# Patient Record
Sex: MALE | HISPANIC OR LATINO | URBAN - METROPOLITAN AREA
[De-identification: names, ages, dates, MRNs, and addresses within clinical notes are randomized per-mention and may not be internally consistent; named-entity substitution may affect disease eponyms.]

---

## 2017-03-03 ENCOUNTER — EMERGENCY (EMERGENCY)
Facility: HOSPITAL | Age: 47
LOS: 1 days | Discharge: HOME | End: 2017-03-03
Attending: EMERGENCY MEDICINE | Admitting: EMERGENCY MEDICINE
Payer: COMMERCIAL

## 2017-03-03 VITALS — HEIGHT: 65 IN | WEIGHT: 190.04 LBS

## 2017-03-03 VITALS — SYSTOLIC BLOOD PRESSURE: 139 MMHG | DIASTOLIC BLOOD PRESSURE: 92 MMHG

## 2017-03-03 PROCEDURE — 72100 X-RAY EXAM L-S SPINE 2/3 VWS: CPT | Mod: 26

## 2017-03-03 PROCEDURE — 72070 X-RAY EXAM THORAC SPINE 2VWS: CPT | Mod: 26

## 2017-03-03 PROCEDURE — 72070 X-RAY EXAM THORAC SPINE 2VWS: CPT

## 2017-03-03 PROCEDURE — 72100 X-RAY EXAM L-S SPINE 2/3 VWS: CPT

## 2017-03-03 PROCEDURE — 99284 EMERGENCY DEPT VISIT MOD MDM: CPT | Mod: 25

## 2017-03-03 PROCEDURE — 99284 EMERGENCY DEPT VISIT MOD MDM: CPT

## 2017-03-03 PROCEDURE — 96372 THER/PROPH/DIAG INJ SC/IM: CPT

## 2017-03-03 PROCEDURE — T1013: CPT

## 2017-03-03 RX ORDER — KETOROLAC TROMETHAMINE 30 MG/ML
30 SYRINGE (ML) INJECTION ONCE
Qty: 0 | Refills: 0 | Status: DISCONTINUED | OUTPATIENT
Start: 2017-03-03 | End: 2017-03-03

## 2017-03-03 RX ORDER — DIAZEPAM 5 MG
5 TABLET ORAL ONCE
Qty: 0 | Refills: 0 | Status: DISCONTINUED | OUTPATIENT
Start: 2017-03-03 | End: 2017-03-03

## 2017-03-03 RX ORDER — IBUPROFEN 200 MG
1 TABLET ORAL
Qty: 16 | Refills: 0 | OUTPATIENT
Start: 2017-03-03 | End: 2017-03-07

## 2017-03-03 RX ADMIN — Medication 30 MILLIGRAM(S): at 12:01

## 2017-03-03 RX ADMIN — Medication 5 MILLIGRAM(S): at 12:01

## 2017-03-03 NOTE — ED STATDOCS - ATTENDING CONTRIBUTION TO CARE
I, Alonso Daily, performed the initial face to face bedside interview with this patient regarding history of present illness, review of symptoms and relevant past medical, social and family history.  I completed an independent physical examination.  I was the initial provider who evaluated this patient. I have signed out the follow up of any pending tests (i.e. labs, radiological studies) to the ACP.  I have communicated the patient’s plan of care and disposition with the ACP.  The history, relevant review of systems, past medical and surgical history, medical decision making, and physical examination was documented by the scribe in my presence and I attest to the accuracy of the documentation.

## 2017-03-03 NOTE — ED STATDOCS - OBJECTIVE STATEMENT
45 y/o male presents c/o back pain x 2 days. Went to [facility] the same day, he was given Ibuprofen 600 mg and Robaxin that has not been helping his pain. Pain is now worsening and radiating to his left leg. 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 Denies urinary/bowel incontinence, numbness/tingling, abd pain. No further complaints at this time. NKDA.  Randy Rouse utilized to obtain history.

## 2017-03-03 NOTE — ED STATDOCS - PROGRESS NOTE DETAILS
PA NOTE: Pt seen by intake physician and hpi/orders/plan reviewed. PT presenting to ED with complaints of back pain x 2 days.  Patient states that pain started when he twisted while moving some cardboard boxes.  Denies blunt trauma.  PE: GEN: Awake, alert,  NAD,  EYES: PERRL CARDIAC: Reg rate and rhythm, S1,S2, RRR  RESP: No distress noted. Lungs CTA bilaterally no wheeze, ronchi, rales. ABD: soft,  non-tender, no guarding. . NEURO: AOx3, no focal deficits   PLAN: xray and f/u with spine center

## 2017-03-03 NOTE — ED ADULT NURSE NOTE - CHIEF COMPLAINT QUOTE
lifted something heavy at work and hurt his back, happ 2 days ago, [facility] after he got hurt and was given motrin and robaxin - relief

## 2017-03-03 NOTE — ED STATDOCS - NS ED MD SCRIBE ATTENDING SCRIBE SECTIONS
HISTORY OF PRESENT ILLNESS/REVIEW OF SYSTEMS/PHYSICAL EXAM/DISPOSITION/HIV/PAST MEDICAL/SURGICAL/SOCIAL HISTORY/VITAL SIGNS( Pullset)

## 2017-03-10 ENCOUNTER — APPOINTMENT (OUTPATIENT)
Dept: RHEUMATOLOGY | Facility: CLINIC | Age: 47
End: 2017-03-10

## 2017-04-25 ENCOUNTER — EMERGENCY (EMERGENCY)
Facility: HOSPITAL | Age: 47
LOS: 1 days | Discharge: LEFT BEFORE DISCHARGE | End: 2017-04-25
Attending: EMERGENCY MEDICINE
Payer: SELF-PAY

## 2017-04-25 VITALS
RESPIRATION RATE: 20 BRPM | WEIGHT: 199.96 LBS | OXYGEN SATURATION: 97 % | TEMPERATURE: 98 F | DIASTOLIC BLOOD PRESSURE: 100 MMHG | SYSTOLIC BLOOD PRESSURE: 160 MMHG | HEART RATE: 76 BPM

## 2017-04-25 DIAGNOSIS — M50.10 CERVICAL DISC DISORDER WITH RADICULOPATHY, UNSPECIFIED CERVICAL REGION: ICD-10-CM

## 2017-04-25 DIAGNOSIS — M54.2 CERVICALGIA: ICD-10-CM

## 2017-04-25 PROCEDURE — 99284 EMERGENCY DEPT VISIT MOD MDM: CPT

## 2017-04-25 RX ORDER — DEXAMETHASONE 0.5 MG/5ML
10 ELIXIR ORAL ONCE
Qty: 0 | Refills: 0 | Status: COMPLETED | OUTPATIENT
Start: 2017-04-25 | End: 2017-04-25

## 2017-04-25 RX ADMIN — Medication 10 MILLIGRAM(S): at 16:04

## 2017-04-25 NOTE — ED ADULT NURSE NOTE - OBJECTIVE STATEMENT
pt states he injured his neck at work about 2 months ago lifting metal, today he has taken his pain medication at home with minimal relief.

## 2017-04-25 NOTE — ED STATDOCS - PROGRESS NOTE DETAILS
Upon re-assessment pt is no longer in RW-1 with gown on chair. Will attempt to find pt in ED. Pt still has not returned to the ED-- considered ELOPED. Went to re-evaluate patient, but apparently patient walked out not notifying MD.  The patient has no motor nor sensory deficit and already has known cervical disc herniation and has appointment on 5/9.

## 2017-04-25 NOTE — ED STATDOCS - OBJECTIVE STATEMENT
45 y/o male, h/o C-spine disc herniation, presenting c/o chronic shoulder and neck pain x 2 months. Pt was injured at work ~1 month ago. Pt has had an MRI at [facility] and is going to have surgery but doesn't have a date scheduled yet. Pt has a follow up appointment scheduled on 5/9/17 w/ his surgeon.

## 2017-04-25 NOTE — ED STATDOCS - ATTENDING CONTRIBUTION TO CARE
I, Florencio Day, performed the initial face to face bedside interview with this patient regarding history of present illness, review of symptoms and relevant past medical, social and family history.  I completed an independent physical examination.  I was the initial provider who evaluated this patient. I have signed out the follow up of any pending tests (i.e. labs, radiological studies) to the ACP.  I have communicated the patient’s plan of care and disposition with the ACP.  The history, relevant review of systems, past medical and surgical history, medical decision making, and physical examination was documented by the scribe in my presence and I attest to the accuracy of the documentation.

## 2017-04-25 NOTE — ED STATDOCS - NS ED MD SCRIBE ATTENDING SCRIBE SECTIONS
DISPOSITION/VITAL SIGNS( Pullset)/PHYSICAL EXAM/HIV/HISTORY OF PRESENT ILLNESS/REVIEW OF SYSTEMS/PAST MEDICAL/SURGICAL/SOCIAL HISTORY

## 2017-09-01 ENCOUNTER — APPOINTMENT (OUTPATIENT)
Dept: NEUROLOGY | Facility: CLINIC | Age: 47
End: 2017-09-01

## 2017-09-12 ENCOUNTER — EMERGENCY (EMERGENCY)
Facility: HOSPITAL | Age: 47
LOS: 1 days | Discharge: HOME | End: 2017-09-12
Attending: EMERGENCY MEDICINE
Payer: COMMERCIAL

## 2017-09-12 VITALS
DIASTOLIC BLOOD PRESSURE: 92 MMHG | RESPIRATION RATE: 20 BRPM | HEIGHT: 60 IN | TEMPERATURE: 98 F | HEART RATE: 79 BPM | OXYGEN SATURATION: 96 % | SYSTOLIC BLOOD PRESSURE: 133 MMHG | WEIGHT: 210.1 LBS

## 2017-09-12 PROCEDURE — 99284 EMERGENCY DEPT VISIT MOD MDM: CPT

## 2017-09-12 PROCEDURE — 72131 CT LUMBAR SPINE W/O DYE: CPT

## 2017-09-12 PROCEDURE — 99284 EMERGENCY DEPT VISIT MOD MDM: CPT | Mod: 25

## 2017-09-12 PROCEDURE — 96372 THER/PROPH/DIAG INJ SC/IM: CPT

## 2017-09-12 PROCEDURE — 72131 CT LUMBAR SPINE W/O DYE: CPT | Mod: 26

## 2017-09-12 RX ORDER — KETOROLAC TROMETHAMINE 30 MG/ML
30 SYRINGE (ML) INJECTION ONCE
Qty: 0 | Refills: 0 | Status: DISCONTINUED | OUTPATIENT
Start: 2017-09-12 | End: 2017-09-12

## 2017-09-12 RX ORDER — OXYCODONE AND ACETAMINOPHEN 5; 325 MG/1; MG/1
1 TABLET ORAL ONCE
Qty: 0 | Refills: 0 | Status: DISCONTINUED | OUTPATIENT
Start: 2017-09-12 | End: 2017-09-12

## 2017-09-12 RX ADMIN — OXYCODONE AND ACETAMINOPHEN 1 TABLET(S): 5; 325 TABLET ORAL at 13:42

## 2017-09-12 RX ADMIN — Medication 30 MILLIGRAM(S): at 13:41

## 2017-09-12 RX ADMIN — Medication 30 MILLIGRAM(S): at 13:56

## 2017-09-12 RX ADMIN — OXYCODONE AND ACETAMINOPHEN 1 TABLET(S): 5; 325 TABLET ORAL at 13:56

## 2017-09-12 NOTE — ED STATDOCS - SKIN, MLM
skin normal color for race, warm, dry and intact. skin normal color for race, warm, dry. abrasion to medial malleolus of left foot

## 2017-09-12 NOTE — ED ADULT NURSE NOTE - CHPI ED SYMPTOMS NEG
no weakness/no numbness/no deformity/no tingling/no difficulty bearing weight/no bruising/no abrasion/no fever
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
## 2017-09-12 NOTE — ED STATDOCS - MUSCULOSKELETAL, MLM
paraspinal tenderness across lumbar spine paraspinal tenderness across lumbar spine, swelling, tenderness to the left foot, swelling, tenderness to b/l ankles, tenderness lateral aspect of left leg

## 2017-09-12 NOTE — ED STATDOCS - PROGRESS NOTE DETAILS
MANUEL Laird- Pt feeling better after ER stay, back pain improvement, CT neg for acute fracture. Pt has PT and pain management doc already. stable for dc.
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
## 2017-09-12 NOTE — ED ADULT NURSE NOTE - OBJECTIVE STATEMENT
pt reports began to have generalized back pain radiating to the legs s/p lifting heavy object yesterday at work. pt has no other complaints at this time. a and o x3. breathing even and unlabored. sitting calm in chair. will continue to monitor.

## 2017-10-04 ENCOUNTER — APPOINTMENT (OUTPATIENT)
Dept: NEUROLOGY | Facility: CLINIC | Age: 47
End: 2017-10-04
Payer: COMMERCIAL

## 2017-10-04 VITALS
HEART RATE: 80 BPM | HEIGHT: 63 IN | WEIGHT: 210 LBS | SYSTOLIC BLOOD PRESSURE: 130 MMHG | BODY MASS INDEX: 37.21 KG/M2 | DIASTOLIC BLOOD PRESSURE: 80 MMHG

## 2017-10-04 PROCEDURE — 99213 OFFICE O/P EST LOW 20 MIN: CPT

## 2017-12-31 ENCOUNTER — EMERGENCY (EMERGENCY)
Facility: HOSPITAL | Age: 47
LOS: 1 days | Discharge: HOME | End: 2017-12-31
Attending: EMERGENCY MEDICINE
Payer: COMMERCIAL

## 2017-12-31 VITALS
HEART RATE: 70 BPM | DIASTOLIC BLOOD PRESSURE: 90 MMHG | OXYGEN SATURATION: 95 % | RESPIRATION RATE: 18 BRPM | SYSTOLIC BLOOD PRESSURE: 147 MMHG

## 2017-12-31 VITALS — HEIGHT: 67 IN | WEIGHT: 199.96 LBS

## 2017-12-31 LAB
ALBUMIN SERPL ELPH-MCNC: 4.2 G/DL — SIGNIFICANT CHANGE UP (ref 3.3–5.2)
ALP SERPL-CCNC: 103 U/L — SIGNIFICANT CHANGE UP (ref 40–120)
ALT FLD-CCNC: 80 U/L — HIGH
ANION GAP SERPL CALC-SCNC: 12 MMOL/L — SIGNIFICANT CHANGE UP (ref 5–17)
AST SERPL-CCNC: 23 U/L — SIGNIFICANT CHANGE UP
BASOPHILS # BLD AUTO: 0 K/UL — SIGNIFICANT CHANGE UP (ref 0–0.2)
BASOPHILS NFR BLD AUTO: 0.4 % — SIGNIFICANT CHANGE UP (ref 0–2)
BILIRUB SERPL-MCNC: 0.3 MG/DL — LOW (ref 0.4–2)
BUN SERPL-MCNC: 17 MG/DL — SIGNIFICANT CHANGE UP (ref 8–20)
CALCIUM SERPL-MCNC: 9.5 MG/DL — SIGNIFICANT CHANGE UP (ref 8.6–10.2)
CHLORIDE SERPL-SCNC: 99 MMOL/L — SIGNIFICANT CHANGE UP (ref 98–107)
CO2 SERPL-SCNC: 26 MMOL/L — SIGNIFICANT CHANGE UP (ref 22–29)
CREAT SERPL-MCNC: 0.73 MG/DL — SIGNIFICANT CHANGE UP (ref 0.5–1.3)
EOSINOPHIL # BLD AUTO: 0.7 K/UL — HIGH (ref 0–0.5)
EOSINOPHIL NFR BLD AUTO: 8 % — HIGH (ref 0–6)
GLUCOSE SERPL-MCNC: 87 MG/DL — SIGNIFICANT CHANGE UP (ref 70–115)
HCT VFR BLD CALC: 47.7 % — SIGNIFICANT CHANGE UP (ref 42–52)
HGB BLD-MCNC: 15.4 G/DL — SIGNIFICANT CHANGE UP (ref 14–18)
LYMPHOCYTES # BLD AUTO: 3.1 K/UL — SIGNIFICANT CHANGE UP (ref 1–4.8)
LYMPHOCYTES # BLD AUTO: 36.4 % — SIGNIFICANT CHANGE UP (ref 20–55)
MCHC RBC-ENTMCNC: 30.3 PG — SIGNIFICANT CHANGE UP (ref 27–31)
MCHC RBC-ENTMCNC: 32.3 G/DL — SIGNIFICANT CHANGE UP (ref 32–36)
MCV RBC AUTO: 93.7 FL — SIGNIFICANT CHANGE UP (ref 80–94)
MONOCYTES # BLD AUTO: 0.7 K/UL — SIGNIFICANT CHANGE UP (ref 0–0.8)
MONOCYTES NFR BLD AUTO: 8.3 % — SIGNIFICANT CHANGE UP (ref 3–10)
NEUTROPHILS # BLD AUTO: 4 K/UL — SIGNIFICANT CHANGE UP (ref 1.8–8)
NEUTROPHILS NFR BLD AUTO: 46.8 % — SIGNIFICANT CHANGE UP (ref 37–73)
PLATELET # BLD AUTO: 282 K/UL — SIGNIFICANT CHANGE UP (ref 150–400)
POTASSIUM SERPL-MCNC: 4.7 MMOL/L — SIGNIFICANT CHANGE UP (ref 3.5–5.3)
POTASSIUM SERPL-SCNC: 4.7 MMOL/L — SIGNIFICANT CHANGE UP (ref 3.5–5.3)
PROT SERPL-MCNC: 8.7 G/DL — SIGNIFICANT CHANGE UP (ref 6.6–8.7)
RBC # BLD: 5.09 M/UL — SIGNIFICANT CHANGE UP (ref 4.6–6.2)
RBC # FLD: 13.5 % — SIGNIFICANT CHANGE UP (ref 11–15.6)
SODIUM SERPL-SCNC: 137 MMOL/L — SIGNIFICANT CHANGE UP (ref 135–145)
WBC # BLD: 8.5 K/UL — SIGNIFICANT CHANGE UP (ref 4.8–10.8)
WBC # FLD AUTO: 8.5 K/UL — SIGNIFICANT CHANGE UP (ref 4.8–10.8)

## 2017-12-31 PROCEDURE — 72131 CT LUMBAR SPINE W/O DYE: CPT

## 2017-12-31 PROCEDURE — 36415 COLL VENOUS BLD VENIPUNCTURE: CPT

## 2017-12-31 PROCEDURE — 72131 CT LUMBAR SPINE W/O DYE: CPT | Mod: 26

## 2017-12-31 PROCEDURE — 99284 EMERGENCY DEPT VISIT MOD MDM: CPT

## 2017-12-31 PROCEDURE — 93005 ELECTROCARDIOGRAM TRACING: CPT

## 2017-12-31 PROCEDURE — 93010 ELECTROCARDIOGRAM REPORT: CPT

## 2017-12-31 PROCEDURE — 80053 COMPREHEN METABOLIC PANEL: CPT

## 2017-12-31 PROCEDURE — 96375 TX/PRO/DX INJ NEW DRUG ADDON: CPT

## 2017-12-31 PROCEDURE — 96374 THER/PROPH/DIAG INJ IV PUSH: CPT

## 2017-12-31 PROCEDURE — 99284 EMERGENCY DEPT VISIT MOD MDM: CPT | Mod: 25

## 2017-12-31 PROCEDURE — T1013: CPT

## 2017-12-31 PROCEDURE — 85027 COMPLETE CBC AUTOMATED: CPT

## 2017-12-31 RX ORDER — HYDROMORPHONE HYDROCHLORIDE 2 MG/ML
1 INJECTION INTRAMUSCULAR; INTRAVENOUS; SUBCUTANEOUS ONCE
Qty: 0 | Refills: 0 | Status: DISCONTINUED | OUTPATIENT
Start: 2017-12-31 | End: 2017-12-31

## 2017-12-31 RX ORDER — MORPHINE SULFATE 50 MG/1
4 CAPSULE, EXTENDED RELEASE ORAL ONCE
Qty: 0 | Refills: 0 | Status: DISCONTINUED | OUTPATIENT
Start: 2017-12-31 | End: 2017-12-31

## 2017-12-31 RX ORDER — KETOROLAC TROMETHAMINE 30 MG/ML
30 SYRINGE (ML) INJECTION ONCE
Qty: 0 | Refills: 0 | Status: DISCONTINUED | OUTPATIENT
Start: 2017-12-31 | End: 2017-12-31

## 2017-12-31 RX ORDER — OXYCODONE AND ACETAMINOPHEN 5; 325 MG/1; MG/1
1 TABLET ORAL ONCE
Qty: 0 | Refills: 0 | Status: DISCONTINUED | OUTPATIENT
Start: 2017-12-31 | End: 2017-12-31

## 2017-12-31 RX ORDER — METHOCARBAMOL 500 MG/1
2 TABLET, FILM COATED ORAL
Qty: 40 | Refills: 0 | OUTPATIENT
Start: 2017-12-31 | End: 2018-01-04

## 2017-12-31 RX ORDER — ONDANSETRON 8 MG/1
4 TABLET, FILM COATED ORAL ONCE
Qty: 0 | Refills: 0 | Status: COMPLETED | OUTPATIENT
Start: 2017-12-31 | End: 2017-12-31

## 2017-12-31 RX ORDER — SODIUM CHLORIDE 9 MG/ML
3 INJECTION INTRAMUSCULAR; INTRAVENOUS; SUBCUTANEOUS EVERY 8 HOURS
Qty: 0 | Refills: 0 | Status: DISCONTINUED | OUTPATIENT
Start: 2017-12-31 | End: 2018-01-04

## 2017-12-31 RX ADMIN — OXYCODONE AND ACETAMINOPHEN 1 TABLET(S): 5; 325 TABLET ORAL at 15:32

## 2017-12-31 RX ADMIN — Medication 30 MILLIGRAM(S): at 16:52

## 2017-12-31 RX ADMIN — OXYCODONE AND ACETAMINOPHEN 1 TABLET(S): 5; 325 TABLET ORAL at 17:35

## 2017-12-31 RX ADMIN — MORPHINE SULFATE 4 MILLIGRAM(S): 50 CAPSULE, EXTENDED RELEASE ORAL at 16:52

## 2017-12-31 RX ADMIN — OXYCODONE AND ACETAMINOPHEN 1 TABLET(S): 5; 325 TABLET ORAL at 16:52

## 2017-12-31 RX ADMIN — MORPHINE SULFATE 4 MILLIGRAM(S): 50 CAPSULE, EXTENDED RELEASE ORAL at 13:32

## 2017-12-31 RX ADMIN — ONDANSETRON 4 MILLIGRAM(S): 8 TABLET, FILM COATED ORAL at 13:32

## 2017-12-31 RX ADMIN — Medication 30 MILLIGRAM(S): at 13:32

## 2017-12-31 RX ADMIN — SODIUM CHLORIDE 3 MILLILITER(S): 9 INJECTION INTRAMUSCULAR; INTRAVENOUS; SUBCUTANEOUS at 13:32
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
## 2017-12-31 NOTE — ED PROVIDER NOTE - PROGRESS NOTE DETAILS
pt feels better and able to walk ok in ed he will f/u with his spine doctor at [facility] and will d/c on pain meds and robaxin and precautions and all instrcutions reviewed with pt in detail via hospital

## 2017-12-31 NOTE — ED PROVIDER NOTE - MUSCULOSKELETAL, MLM
Spine appears normal, range of motion is not limited, pos back paraspinal muscle tenderness and spasm no vertebral tenderness

## 2017-12-31 NOTE — ED PROVIDER NOTE - OBJECTIVE STATEMENT
pt prefers daughter translate 46 y/o male with a h/o Cervical and lumbar herniated discs and he is awaiting surgery and he was well until this morning he bent over and felt sudden pain in his lower back and tingling in both legs

## 2017-12-31 NOTE — ED ADULT NURSE REASSESSMENT NOTE - NS ED NURSE REASSESS COMMENT FT1
pt remains AOX3, reports relief after meds but states still has pain. BP stable at this time, family at bedside, even and unlabored resps.
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
## 2018-02-10 ENCOUNTER — EMERGENCY (EMERGENCY)
Facility: HOSPITAL | Age: 48
LOS: 1 days | Discharge: HOME | End: 2018-02-10
Attending: EMERGENCY MEDICINE | Admitting: EMERGENCY MEDICINE
Payer: COMMERCIAL

## 2018-02-10 VITALS
SYSTOLIC BLOOD PRESSURE: 149 MMHG | RESPIRATION RATE: 18 BRPM | OXYGEN SATURATION: 98 % | WEIGHT: 199.96 LBS | HEART RATE: 71 BPM | DIASTOLIC BLOOD PRESSURE: 94 MMHG | TEMPERATURE: 99 F | HEIGHT: 66 IN

## 2018-02-10 PROCEDURE — 99283 EMERGENCY DEPT VISIT LOW MDM: CPT

## 2018-02-10 PROCEDURE — T1013: CPT

## 2018-02-10 RX ORDER — AMOXICILLIN 250 MG/5ML
1 SUSPENSION, RECONSTITUTED, ORAL (ML) ORAL
Qty: 14 | Refills: 0 | OUTPATIENT
Start: 2018-02-10 | End: 2018-02-16

## 2018-02-10 NOTE — ED ADULT NURSE NOTE - OBJECTIVE STATEMENT
46y/o male c/o headache and fever. PT AOx4, resp even unlabored, febrile at time of assessment, MD aware.

## 2018-02-10 NOTE — ED ADULT TRIAGE NOTE - CHIEF COMPLAINT QUOTE
Patient arrived to ED today with c/o headache since yesterday, sore throat, and abdominal pain and constipation.

## 2018-04-04 ENCOUNTER — APPOINTMENT (OUTPATIENT)
Dept: NEUROLOGY | Facility: CLINIC | Age: 48
End: 2018-04-04
Payer: COMMERCIAL

## 2018-04-04 VITALS
BODY MASS INDEX: 37.21 KG/M2 | SYSTOLIC BLOOD PRESSURE: 132 MMHG | WEIGHT: 210 LBS | HEIGHT: 63 IN | DIASTOLIC BLOOD PRESSURE: 74 MMHG

## 2018-04-04 DIAGNOSIS — R42 DIZZINESS AND GIDDINESS: ICD-10-CM

## 2018-04-04 PROCEDURE — 99213 OFFICE O/P EST LOW 20 MIN: CPT

## 2018-04-25 NOTE — ED STATDOCS - CROS ED GU ALL NEG
Mildly to Moderately Impaired: difficulty hearing in some environments or speaker may need to increase volume or speak distinctly
- - -

## 2018-07-23 ENCOUNTER — EMERGENCY (EMERGENCY)
Facility: HOSPITAL | Age: 48
LOS: 1 days | Discharge: HOME | End: 2018-07-23
Attending: EMERGENCY MEDICINE
Payer: COMMERCIAL

## 2018-07-23 VITALS — WEIGHT: 199.96 LBS | HEIGHT: 65 IN

## 2018-07-23 VITALS — DIASTOLIC BLOOD PRESSURE: 91 MMHG | SYSTOLIC BLOOD PRESSURE: 153 MMHG

## 2018-07-23 PROCEDURE — T1013: CPT

## 2018-07-23 PROCEDURE — 99284 EMERGENCY DEPT VISIT MOD MDM: CPT | Mod: 25

## 2018-07-23 PROCEDURE — 99283 EMERGENCY DEPT VISIT LOW MDM: CPT

## 2018-07-23 RX ORDER — METHOCARBAMOL 500 MG/1
1 TABLET, FILM COATED ORAL
Qty: 15 | Refills: 0
Start: 2018-07-23 | End: 2018-07-27

## 2018-07-23 RX ORDER — LEVETIRACETAM 250 MG/1
500 TABLET, FILM COATED ORAL
Qty: 0 | Refills: 0 | COMMUNITY

## 2018-07-23 RX ORDER — IBUPROFEN 200 MG
1 TABLET ORAL
Qty: 15 | Refills: 0 | OUTPATIENT
Start: 2018-07-23 | End: 2018-07-27

## 2018-07-23 NOTE — ED STATDOCS - ATTENDING CONTRIBUTION TO CARE
MD/NP VISIT  PT WITH CHRONIC NECK PAIN HAS APPT WITH MD THIS WEEK  AGREE WITH TREATMENT  AGREE WITH HX ELISE DIAZ

## 2018-07-23 NOTE — ED STATDOCS - OBJECTIVE STATEMENT
46 y/o M with hx of chronic neck pain on percocet presents with HA and right shoulder pain increasing x 2 days.   He has an appointment with spine surgeon on Wednesday.  Denies shoulder pain.  Took ibuprofen at 3 pm.

## 2018-08-08 ENCOUNTER — INPATIENT (INPATIENT)
Facility: HOSPITAL | Age: 48
LOS: 4 days | Discharge: HOME | End: 2018-08-13
Attending: ORTHOPAEDIC SURGERY | Admitting: ORTHOPAEDIC SURGERY
Payer: OTHER MISCELLANEOUS

## 2018-08-08 ENCOUNTER — RESULT REVIEW (OUTPATIENT)
Age: 48
End: 2018-08-08

## 2018-08-08 VITALS — WEIGHT: 199.96 LBS | HEIGHT: 66 IN
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
## 2018-08-08 PROBLEM — M54.2 CERVICALGIA: Chronic | Status: ACTIVE | Noted: 2018-07-23

## 2018-08-08 LAB
ABO RH CONFIRMATION: SIGNIFICANT CHANGE UP
ALBUMIN SERPL ELPH-MCNC: 3.8 G/DL — SIGNIFICANT CHANGE UP (ref 3.3–5)
ALP SERPL-CCNC: 93 U/L — SIGNIFICANT CHANGE UP (ref 40–120)
ALT FLD-CCNC: 83 U/L — HIGH (ref 12–78)
ANION GAP SERPL CALC-SCNC: 7 MMOL/L — SIGNIFICANT CHANGE UP (ref 5–17)
APPEARANCE UR: CLEAR — SIGNIFICANT CHANGE UP
APTT BLD: 33.6 SEC — SIGNIFICANT CHANGE UP (ref 27.5–37.4)
AST SERPL-CCNC: 25 U/L — SIGNIFICANT CHANGE UP (ref 15–37)
BASOPHILS # BLD AUTO: 0.04 K/UL — SIGNIFICANT CHANGE UP (ref 0–0.2)
BASOPHILS NFR BLD AUTO: 0.4 % — SIGNIFICANT CHANGE UP (ref 0–2)
BILIRUB SERPL-MCNC: 0.4 MG/DL — SIGNIFICANT CHANGE UP (ref 0.2–1.2)
BILIRUB UR-MCNC: NEGATIVE — SIGNIFICANT CHANGE UP
BLD GP AB SCN SERPL QL: SIGNIFICANT CHANGE UP
BUN SERPL-MCNC: 14 MG/DL — SIGNIFICANT CHANGE UP (ref 7–23)
CALCIUM SERPL-MCNC: 9.2 MG/DL — SIGNIFICANT CHANGE UP (ref 8.5–10.1)
CHLORIDE SERPL-SCNC: 103 MMOL/L — SIGNIFICANT CHANGE UP (ref 96–108)
CO2 SERPL-SCNC: 29 MMOL/L — SIGNIFICANT CHANGE UP (ref 22–31)
COLOR SPEC: YELLOW — SIGNIFICANT CHANGE UP
CREAT SERPL-MCNC: 0.98 MG/DL — SIGNIFICANT CHANGE UP (ref 0.5–1.3)
DIFF PNL FLD: NEGATIVE — SIGNIFICANT CHANGE UP
EOSINOPHIL # BLD AUTO: 0.94 K/UL — HIGH (ref 0–0.5)
EOSINOPHIL NFR BLD AUTO: 10 % — HIGH (ref 0–6)
GLUCOSE SERPL-MCNC: 89 MG/DL — SIGNIFICANT CHANGE UP (ref 70–99)
GLUCOSE UR QL: NEGATIVE MG/DL — SIGNIFICANT CHANGE UP
HCT VFR BLD CALC: 45.3 % — SIGNIFICANT CHANGE UP (ref 39–50)
HGB BLD-MCNC: 15.3 G/DL — SIGNIFICANT CHANGE UP (ref 13–17)
IMM GRANULOCYTES NFR BLD AUTO: 0.6 % — SIGNIFICANT CHANGE UP (ref 0–1.5)
INR BLD: 1.04 RATIO — SIGNIFICANT CHANGE UP (ref 0.88–1.16)
KETONES UR-MCNC: NEGATIVE — SIGNIFICANT CHANGE UP
LEUKOCYTE ESTERASE UR-ACNC: NEGATIVE — SIGNIFICANT CHANGE UP
LYMPHOCYTES # BLD AUTO: 3.28 K/UL — SIGNIFICANT CHANGE UP (ref 1–3.3)
LYMPHOCYTES # BLD AUTO: 35 % — SIGNIFICANT CHANGE UP (ref 13–44)
MCHC RBC-ENTMCNC: 31.2 PG — SIGNIFICANT CHANGE UP (ref 27–34)
MCHC RBC-ENTMCNC: 33.8 GM/DL — SIGNIFICANT CHANGE UP (ref 32–36)
MCV RBC AUTO: 92.4 FL — SIGNIFICANT CHANGE UP (ref 80–100)
MONOCYTES # BLD AUTO: 0.85 K/UL — SIGNIFICANT CHANGE UP (ref 0–0.9)
MONOCYTES NFR BLD AUTO: 9.1 % — SIGNIFICANT CHANGE UP (ref 2–14)
NEUTROPHILS # BLD AUTO: 4.2 K/UL — SIGNIFICANT CHANGE UP (ref 1.8–7.4)
NEUTROPHILS NFR BLD AUTO: 44.9 % — SIGNIFICANT CHANGE UP (ref 43–77)
NITRITE UR-MCNC: NEGATIVE — SIGNIFICANT CHANGE UP
NRBC # BLD: 0 /100 WBCS — SIGNIFICANT CHANGE UP (ref 0–0)
PH UR: 8 — SIGNIFICANT CHANGE UP (ref 5–8)
PLATELET # BLD AUTO: 261 K/UL — SIGNIFICANT CHANGE UP (ref 150–400)
POTASSIUM SERPL-MCNC: 4.7 MMOL/L — SIGNIFICANT CHANGE UP (ref 3.5–5.3)
POTASSIUM SERPL-SCNC: 4.7 MMOL/L — SIGNIFICANT CHANGE UP (ref 3.5–5.3)
PROT SERPL-MCNC: 8.6 GM/DL — HIGH (ref 6–8.3)
PROT UR-MCNC: NEGATIVE MG/DL — SIGNIFICANT CHANGE UP
PROTHROM AB SERPL-ACNC: 11.2 SEC — SIGNIFICANT CHANGE UP (ref 9.8–12.7)
RBC # BLD: 4.9 M/UL — SIGNIFICANT CHANGE UP (ref 4.2–5.8)
RBC # FLD: 13.3 % — SIGNIFICANT CHANGE UP (ref 10.3–14.5)
SODIUM SERPL-SCNC: 139 MMOL/L — SIGNIFICANT CHANGE UP (ref 135–145)
SP GR SPEC: 1.01 — SIGNIFICANT CHANGE UP (ref 1.01–1.02)
TYPE + AB SCN PNL BLD: SIGNIFICANT CHANGE UP
UROBILINOGEN FLD QL: NEGATIVE MG/DL — SIGNIFICANT CHANGE UP
WBC # BLD: 9.37 K/UL — SIGNIFICANT CHANGE UP (ref 3.8–10.5)
WBC # FLD AUTO: 9.37 K/UL — SIGNIFICANT CHANGE UP (ref 3.8–10.5)

## 2018-08-08 PROCEDURE — 99285 EMERGENCY DEPT VISIT HI MDM: CPT

## 2018-08-08 PROCEDURE — 72040 X-RAY EXAM NECK SPINE 2-3 VW: CPT | Mod: 26

## 2018-08-08 PROCEDURE — 93010 ELECTROCARDIOGRAM REPORT: CPT

## 2018-08-08 PROCEDURE — 71045 X-RAY EXAM CHEST 1 VIEW: CPT | Mod: 26

## 2018-08-08 PROCEDURE — 88304 TISSUE EXAM BY PATHOLOGIST: CPT | Mod: 26

## 2018-08-08 RX ORDER — OXYCODONE HYDROCHLORIDE 5 MG/1
5 TABLET ORAL EVERY 4 HOURS
Qty: 0 | Refills: 0 | Status: DISCONTINUED | OUTPATIENT
Start: 2018-08-08 | End: 2018-08-08

## 2018-08-08 RX ORDER — DOCUSATE SODIUM 100 MG
100 CAPSULE ORAL THREE TIMES A DAY
Qty: 0 | Refills: 0 | Status: DISCONTINUED | OUTPATIENT
Start: 2018-08-08 | End: 2018-08-08

## 2018-08-08 RX ORDER — ACETAMINOPHEN 500 MG
650 TABLET ORAL EVERY 6 HOURS
Qty: 0 | Refills: 0 | Status: DISCONTINUED | OUTPATIENT
Start: 2018-08-08 | End: 2018-08-08

## 2018-08-08 RX ORDER — LEVETIRACETAM 250 MG/1
1000 TABLET, FILM COATED ORAL
Qty: 0 | Refills: 0 | Status: DISCONTINUED | OUTPATIENT
Start: 2018-08-08 | End: 2018-08-08

## 2018-08-08 RX ORDER — PANTOPRAZOLE SODIUM 20 MG/1
40 TABLET, DELAYED RELEASE ORAL
Qty: 0 | Refills: 0 | Status: DISCONTINUED | OUTPATIENT
Start: 2018-08-08 | End: 2018-08-13

## 2018-08-08 RX ORDER — OXYCODONE HYDROCHLORIDE 5 MG/1
10 TABLET ORAL ONCE
Qty: 0 | Refills: 0 | Status: DISCONTINUED | OUTPATIENT
Start: 2018-08-08 | End: 2018-08-08

## 2018-08-08 RX ORDER — SODIUM CHLORIDE 9 MG/ML
1000 INJECTION, SOLUTION INTRAVENOUS
Qty: 0 | Refills: 0 | Status: DISCONTINUED | OUTPATIENT
Start: 2018-08-08 | End: 2018-08-13

## 2018-08-08 RX ORDER — ONDANSETRON 8 MG/1
4 TABLET, FILM COATED ORAL EVERY 4 HOURS
Qty: 0 | Refills: 0 | Status: DISCONTINUED | OUTPATIENT
Start: 2018-08-08 | End: 2018-08-13

## 2018-08-08 RX ORDER — OXYCODONE HYDROCHLORIDE 5 MG/1
10 TABLET ORAL EVERY 4 HOURS
Qty: 0 | Refills: 0 | Status: DISCONTINUED | OUTPATIENT
Start: 2018-08-08 | End: 2018-08-13

## 2018-08-08 RX ORDER — ACETAMINOPHEN 500 MG
650 TABLET ORAL EVERY 6 HOURS
Qty: 0 | Refills: 0 | Status: DISCONTINUED | OUTPATIENT
Start: 2018-08-08 | End: 2018-08-13

## 2018-08-08 RX ORDER — FENTANYL CITRATE 50 UG/ML
50 INJECTION INTRAVENOUS
Qty: 0 | Refills: 0 | Status: DISCONTINUED | OUTPATIENT
Start: 2018-08-08 | End: 2018-08-08

## 2018-08-08 RX ORDER — HYDROMORPHONE HYDROCHLORIDE 2 MG/ML
1 INJECTION INTRAMUSCULAR; INTRAVENOUS; SUBCUTANEOUS EVERY 4 HOURS
Qty: 0 | Refills: 0 | Status: DISCONTINUED | OUTPATIENT
Start: 2018-08-08 | End: 2018-08-08

## 2018-08-08 RX ORDER — LEVETIRACETAM 250 MG/1
1000 TABLET, FILM COATED ORAL
Qty: 0 | Refills: 0 | Status: DISCONTINUED | OUTPATIENT
Start: 2018-08-08 | End: 2018-08-13

## 2018-08-08 RX ORDER — ASCORBIC ACID 60 MG
500 TABLET,CHEWABLE ORAL
Qty: 0 | Refills: 0 | Status: DISCONTINUED | OUTPATIENT
Start: 2018-08-08 | End: 2018-08-13

## 2018-08-08 RX ORDER — DIVALPROEX SODIUM 500 MG/1
1000 TABLET, DELAYED RELEASE ORAL
Qty: 0 | Refills: 0 | Status: DISCONTINUED | OUTPATIENT
Start: 2018-08-08 | End: 2018-08-11

## 2018-08-08 RX ORDER — OXYCODONE HYDROCHLORIDE 5 MG/1
5 TABLET ORAL EVERY 4 HOURS
Qty: 0 | Refills: 0 | Status: DISCONTINUED | OUTPATIENT
Start: 2018-08-08 | End: 2018-08-13

## 2018-08-08 RX ORDER — DOCUSATE SODIUM 100 MG
100 CAPSULE ORAL THREE TIMES A DAY
Qty: 0 | Refills: 0 | Status: DISCONTINUED | OUTPATIENT
Start: 2018-08-08 | End: 2018-08-13

## 2018-08-08 RX ORDER — OXYCODONE HYDROCHLORIDE 5 MG/1
5 TABLET ORAL ONCE
Qty: 0 | Refills: 0 | Status: DISCONTINUED | OUTPATIENT
Start: 2018-08-08 | End: 2018-08-08

## 2018-08-08 RX ORDER — INFLUENZA VIRUS VACCINE 15; 15; 15; 15 UG/.5ML; UG/.5ML; UG/.5ML; UG/.5ML
0.5 SUSPENSION INTRAMUSCULAR ONCE
Qty: 0 | Refills: 0 | Status: COMPLETED | OUTPATIENT
Start: 2018-08-08 | End: 2018-08-08

## 2018-08-08 RX ORDER — DIPHENHYDRAMINE HCL 50 MG
25 CAPSULE ORAL AT BEDTIME
Qty: 0 | Refills: 0 | Status: DISCONTINUED | OUTPATIENT
Start: 2018-08-08 | End: 2018-08-13

## 2018-08-08 RX ORDER — DIVALPROEX SODIUM 500 MG/1
1000 TABLET, DELAYED RELEASE ORAL
Qty: 0 | Refills: 0 | Status: DISCONTINUED | OUTPATIENT
Start: 2018-08-08 | End: 2018-08-08

## 2018-08-08 RX ORDER — HYDROMORPHONE HYDROCHLORIDE 2 MG/ML
1 INJECTION INTRAMUSCULAR; INTRAVENOUS; SUBCUTANEOUS EVERY 6 HOURS
Qty: 0 | Refills: 0 | Status: DISCONTINUED | OUTPATIENT
Start: 2018-08-08 | End: 2018-08-13

## 2018-08-08 RX ORDER — CYCLOBENZAPRINE HYDROCHLORIDE 10 MG/1
10 TABLET, FILM COATED ORAL EVERY 8 HOURS
Qty: 0 | Refills: 0 | Status: DISCONTINUED | OUTPATIENT
Start: 2018-08-08 | End: 2018-08-13

## 2018-08-08 RX ORDER — CEFAZOLIN SODIUM 1 G
2000 VIAL (EA) INJECTION EVERY 8 HOURS
Qty: 0 | Refills: 0 | Status: DISCONTINUED | OUTPATIENT
Start: 2018-08-08 | End: 2018-08-10

## 2018-08-08 RX ORDER — SENNA PLUS 8.6 MG/1
2 TABLET ORAL AT BEDTIME
Qty: 0 | Refills: 0 | Status: DISCONTINUED | OUTPATIENT
Start: 2018-08-08 | End: 2018-08-13

## 2018-08-08 RX ORDER — SODIUM CHLORIDE 9 MG/ML
3 INJECTION INTRAMUSCULAR; INTRAVENOUS; SUBCUTANEOUS ONCE
Qty: 0 | Refills: 0 | Status: COMPLETED | OUTPATIENT
Start: 2018-08-08 | End: 2018-08-08

## 2018-08-08 RX ORDER — SODIUM CHLORIDE 9 MG/ML
1000 INJECTION, SOLUTION INTRAVENOUS
Qty: 0 | Refills: 0 | Status: DISCONTINUED | OUTPATIENT
Start: 2018-08-08 | End: 2018-08-08

## 2018-08-08 RX ORDER — DIPHENHYDRAMINE HCL 50 MG
12.5 CAPSULE ORAL EVERY 4 HOURS
Qty: 0 | Refills: 0 | Status: DISCONTINUED | OUTPATIENT
Start: 2018-08-08 | End: 2018-08-13

## 2018-08-08 RX ORDER — BENZOCAINE AND MENTHOL 5; 1 G/100ML; G/100ML
1 LIQUID ORAL
Qty: 0 | Refills: 0 | Status: DISCONTINUED | OUTPATIENT
Start: 2018-08-08 | End: 2018-08-13

## 2018-08-08 RX ORDER — ONDANSETRON 8 MG/1
4 TABLET, FILM COATED ORAL ONCE
Qty: 0 | Refills: 0 | Status: DISCONTINUED | OUTPATIENT
Start: 2018-08-08 | End: 2018-08-08

## 2018-08-08 RX ORDER — OXYCODONE HYDROCHLORIDE 5 MG/1
10 TABLET ORAL EVERY 4 HOURS
Qty: 0 | Refills: 0 | Status: DISCONTINUED | OUTPATIENT
Start: 2018-08-08 | End: 2018-08-08

## 2018-08-08 RX ORDER — FOLIC ACID 0.8 MG
1 TABLET ORAL DAILY
Qty: 0 | Refills: 0 | Status: DISCONTINUED | OUTPATIENT
Start: 2018-08-08 | End: 2018-08-13

## 2018-08-08 RX ORDER — SODIUM CHLORIDE 9 MG/ML
500 INJECTION INTRAMUSCULAR; INTRAVENOUS; SUBCUTANEOUS ONCE
Qty: 0 | Refills: 0 | Status: COMPLETED | OUTPATIENT
Start: 2018-08-08 | End: 2018-08-08

## 2018-08-08 RX ORDER — SENNA PLUS 8.6 MG/1
2 TABLET ORAL AT BEDTIME
Qty: 0 | Refills: 0 | Status: DISCONTINUED | OUTPATIENT
Start: 2018-08-08 | End: 2018-08-08

## 2018-08-08 RX ADMIN — OXYCODONE HYDROCHLORIDE 10 MILLIGRAM(S): 5 TABLET ORAL at 20:42

## 2018-08-08 RX ADMIN — SODIUM CHLORIDE 500 MILLILITER(S): 9 INJECTION INTRAMUSCULAR; INTRAVENOUS; SUBCUTANEOUS at 08:10

## 2018-08-08 RX ADMIN — SODIUM CHLORIDE 500 MILLILITER(S): 9 INJECTION INTRAMUSCULAR; INTRAVENOUS; SUBCUTANEOUS at 09:05

## 2018-08-08 RX ADMIN — FENTANYL CITRATE 50 MICROGRAM(S): 50 INJECTION INTRAVENOUS at 20:40

## 2018-08-08 RX ADMIN — OXYCODONE HYDROCHLORIDE 10 MILLIGRAM(S): 5 TABLET ORAL at 20:40

## 2018-08-08 RX ADMIN — SODIUM CHLORIDE 3 MILLILITER(S): 9 INJECTION INTRAMUSCULAR; INTRAVENOUS; SUBCUTANEOUS at 08:10

## 2018-08-08 RX ADMIN — FENTANYL CITRATE 50 MICROGRAM(S): 50 INJECTION INTRAVENOUS at 20:42

## 2018-08-08 RX ADMIN — OXYCODONE HYDROCHLORIDE 10 MILLIGRAM(S): 5 TABLET ORAL at 22:21

## 2018-08-08 RX ADMIN — SODIUM CHLORIDE 75 MILLILITER(S): 9 INJECTION, SOLUTION INTRAVENOUS at 15:28

## 2018-08-08 NOTE — H&P ADULT - NSHPPHYSICALEXAM_GEN_ALL_CORE
Gen: AOx3    Spine:   Skin intact, diffusely TTP throughout neck, spinous processes NTTP    UE -  SILT C2-T1, sensation RUE present but decreased in comparison to contralateral side  +ain/pin/r/u/m/mc b/l; diminished  strength R hand compared to L  +RP b/l    LE -   SILT L2-S1, sensation RLE present but globally decreased when compared to contralateral side  +EHL/fhl/gs/ta b/l  +DP/PT b/l Gen: AOx3    Spine:   Skin intact, diffusely TTP throughout neck, spinous processes NTTP    UE -  SILT C2-T1, sensation RUE present but decreased in comparison to contralateral side  +ain/pin/r/u/m/mc b/l; diminished  strength R hand compared to L, weakness noted in C4-5 abduction and elbow flexion as well on right along with  strength  +RP b/l    LE -   SILT L2-S1, sensation RLE present but globally decreased when compared to contralateral side  +EHL/fhl/gs/ta b/l  +DP/PT b/l

## 2018-08-08 NOTE — ED ADULT NURSE REASSESSMENT NOTE - NS ED NURSE REASSESS COMMENT FT1
Pt rec'd A & Ox3 c/o of right side arm to foot Md Diaz aware. Pt c/o neck pain med offered but patient did not want at this time. IVF while NPO, PT voiding without difficulty ambulates to bathroom with cane. Report giving to OR & RN on 2N patient left the ER

## 2018-08-08 NOTE — CONSULT NOTE ADULT - SUBJECTIVE AND OBJECTIVE BOX
CC- neck pain with numbness to RUE    HPI:  46yo/M with PMH seizure disorder presented with neck pain associated with numbness and tingling to RUE. Patient was seen by spine DR Parker and will need to go to OR later on today. Medical consult called for medical clearance. Patient does not have known cardiac history, he denies chest pain and dyspnea on exertion or rest.     PMH- as above  PSH- spine surgery  Soc hx- denies smoking, alcohol-socially  Fam hx- non-contributory    Review of system- All 10 systems reviewed and is as per HPI otherwise negative.     T(C): 36.6 (18 @ 11:47), Max: 36.8 (18 @ 07:26)  HR: 62 (18 @ 11:47) (62 - 62)  BP: 127/84 (18 @ 11:47) (127/84 - 145/96)  RR: 15 (18 @ 07:26) (15 - 15)  SpO2: 97% (18 @ 11:47) (97% - 99%)  Wt(kg): --      LABS:                        15.3   9.37  )-----------( 261      ( 08 Aug 2018 07:59 )             45.3     -    139  |  103  |  14  ----------------------------<  89  4.7   |  29  |  0.98    Ca    9.2      08 Aug 2018 07:59    TPro  8.6<H>  /  Alb  3.8  /  TBili  0.4  /  DBili  x   /  AST  25  /  ALT  83<H>  /  AlkPhos  93  08-08    PT/INR - ( 08 Aug 2018 07:59 )   PT: 11.2 sec;   INR: 1.04 ratio       PTT - ( 08 Aug 2018 07:59 )  PTT:33.6 sec    Urinalysis Basic - ( 08 Aug 2018 11:00 )  Color: Yellow / Appearance: Clear / S.010 / pH: x  Gluc: x / Ketone: Negative  / Bili: Negative / Urobili: Negative mg/dL   Blood: x / Protein: Negative mg/dL / Nitrite: Negative   Leuk Esterase: Negative / RBC: x / WBC x   Sq Epi: x / Non Sq Epi: x / Bacteria: x    RADIOLOGY & ADDITIONAL TESTS:  EXAM:  XR CHEST AP OR PA 1V                        PROCEDURE DATE:  2018    INTERPRETATION:  Exam Date: 2018 9:34 AM    History: Preoperative evaluation    Technique: Single frontal portable view of the chest with no prior   studies available for comparison    Findings:    The heart is mildly enlarged versus artifact from AP projection.  The   lungs are grossly clear. The apices and hemidiaphragms are unremarkable.   Degenerative changes of the visualized osseous structures.    Impression:  No acute disease    PHYSICAL EXAM:  GENERAL: NAD, well-groomed, well-developed  HEAD:  Atraumatic, Normocephalic  EYES: EOMI, PERRLA, conjunctiva and sclera clear  HEENT: Moist mucous membranes  NECK: Supple, No JVD, some pain on movements  NERVOUS SYSTEM:  Alert & Oriented X3, Motor Strength 5/5 B/L upper and lower extremities; DTRs 2+ intact and symmetric  CHEST/LUNG: Clear to auscultation bilaterally; No rales, rhonchi, wheezing, or rubs  HEART: Regular rate and rhythm; No murmurs, rubs, or gallops  ABDOMEN: Soft, Nontender, Nondistended; Bowel sounds present  GENITOURINARY- Voiding, no palpable bladder  EXTREMITIES:  2+ Peripheral Pulses, No clubbing, cyanosis, or edema  MUSCULOSKELTAL- No muscle tenderness, Muscle tone normal, No joint tenderness, no Joint swelling, Joint range of motion-normal  SKIN-no rash, no lesion  CNS- alert, oriented X3, non focal     Daily Height in cm: 167.64 (08 Aug 2018 07:23)      lactated ringers. 1000 milliLiter(s) IV Continuous <Continuous>    Assessment/Plan  #Cervical radiculopathy  Spine eval appreciated  Pain meds prn  DVT proph on hold for OR  OR later on today  EKG- sinus, LAFB  Patient is medically optimized for OR    #Seizure disorder  Cont Depakote and Keppra    #Dispo- thank you for consult, will follow with you. Patient is medically optimized for OR.

## 2018-08-08 NOTE — ED PROVIDER NOTE - OBJECTIVE STATEMENT
46 yo HM ambulatory to ED for supposed admission, neck surgery, Dr. Parker.  Work-related chronic neck/lower back pains: , + frequent heavy lifting.  Pt s/p lumbar herniated disc sx ~ 6 mos. ago.  Pt reports 1 year post neck pain: sharp, constant, exacerbated by movement, + associated decreased R hand grasp (R hand-dominant) & some paresthesias R digits.  Outpt w/u, incl. XRs & MRI: pt reports + herniated cervical discs, office f/u last week: pt referred to go to ED this AM for admission, surgery.  Last meal yesterday 4 PM; pt took his AM anti-sz. meds PTA.  NKDA.   Meds: Keprra 500 mg bid, Depakote 500 mg bid.  PCP: Anita.  Spine: Keith.

## 2018-08-08 NOTE — H&P ADULT - ASSESSMENT
48yo M w/ HNP. To OR today for ACDF C4-6 w/ Dr. Parker.    - Pain Control  - DVT PPx: SCDs, hold all chemical PPx at midnight  - NPO except for meds  - IVF while NPO  - Medical Clearance per Dr. Diaz  - Plan for OR    Pt discussed w/ Dr. Parker who agrees with the plan. 48yo M w/ HNP, progressively neurologic findings with interactable pain and sensory changes    - Pain Control  - DVT PPx: SCDs, hold all chemical PPx at midnight  - NPO except for meds  - IVF while NPO  - Medical Clearance per Dr. Diaz  - Plan for OR    Pt discussed w/ Dr. Parker who agrees with the plan.

## 2018-08-08 NOTE — H&P ADULT - HISTORY OF PRESENT ILLNESS
48yo RHD Greek speaking M w/ neck and back pain since lifting heavy piece of metal approx 1.5y ago. Pt presents to ED today for admission and OR today for ACDF. Today, pt reports weakness, numbness and tingling in RUE as well as stabbing pain in his neck. Pain is at baseline. Denies recent falls/trauma. No food or drink since yesterday. 46yo RHD Danish speaking M w/ neck and back pain since lifting heavy piece of metal approx 1.5y ago. Pt in the last 4 weeks had two separate episodes of worsening neurologic sysmptoms with visits to emergency room at hospitals in the [place].  Patient called and informed that his right arm is getting worse and he has difficulty holding things on the right side.  Repeat MRI was performed at [facility] which showed flattening of the anterior spinal cord at C5-6 and central herniation with cord contact at C4-5.  Patient came to [facility] office yesterday with again reports of progressive increased pain, and right sided weakness.  Therefore, patient was advised to come to the ER to address his neurologic weakness.    Today, pt reports weakness, numbness and tingling in RUE as well as stabbing pain in his neck. Pain is at baseline. Denies recent falls/trauma. No food or drink since yesterday.

## 2018-08-08 NOTE — ED PROVIDER NOTE - PROGRESS NOTE DETAILS
Dr. Silva:  Ector Parker, Spine. Dr. Silva:  Admission accepted by Dr. Parker for Med/Surg, pt for OR today.

## 2018-08-08 NOTE — ED PROVIDER NOTE - CONSTITUTIONAL, MLM
normal... Overweight HM, awake, alert, oriented to person, place, time/situation and in no apparent distress.  No respiratory discomfort.

## 2018-08-08 NOTE — ED PROVIDER NOTE - NEUROLOGICAL, MLM
Alert and oriented, no focal deficits, no motor or sensory deficits.  CNs II - XII intact.  Decreased R hand grasp.  No obvious sensory deficit.

## 2018-08-08 NOTE — PROGRESS NOTE ADULT - SUBJECTIVE AND OBJECTIVE BOX
[facility]  Operative Report  Date of Surgery: 08/08/18  Patient: Bill House  Surgeon: Genet Parker DO – Orthopedic Surgery  Co-Surgeon: James London Do - Neurosurgeon  Assistant: Orthopedic Residents  Medical Record Number: [id number]  Account Number: [id number]  Dictation:   Pre op Diagnosis:  Cervical Myelopathy, Cervical Stenosis with spinal cord contact at C4*5 and flattening of anterior cord at C5-6, neurologic deficits in upper extremity with weakness in upper extremity and radiating pain, severe neck pain, persistent pain, numbness and tingling, weakness in right arm.    Post op Diagnosis: Cervical disc herniation most significant at C5/6 and C4/5  Procedure Summary: Discectomy C5/6, Discectomy C4/5  Interbody biomechanical device C5/6  Biomechanical Cage C4/5  Autograft for bone graft C5 and C6 and C4  Allograft Morselized  Arthrodesis C4/5/6  Anterior Instrumentation C4/5/6.  High Field magnification  Fluoroscopy supervision and reading of xray during the case  Anesthesia: General Endotracheal Intubation with Neuromonitoring  Positioning: Supine on Flat table.  Neuromonitoring: MEP, SSEP  Mechanical Venodyne Compression Device  Complication None:  Estimated Blood Loss: minimal   Procedure Summary:    Preoperative Discussion: Risk and benefits of surgery were discussed extensively with the patient.     I had extensive discussion with patient that expectation of full recovery is unrealistic but spinal cord decompression gives him the chance to improve his symptoms and neck pain.     Goal is to address  the most significant levels.      Risk and benefits discussed in detail and patient agreed to proceed.  All questions answered.  Including risk of paralysis, injury to esophagus, trachea, carotid vessels, swallowing difficulty that is usually transient, voice hoarseness, laryngeal injury as well as instrumentation, bone graft from cadaver bone and interbody cage.      Procedure in detail: Informed consent was obtained. Left sided of the neck was marked in the preoperative area. Patient received general anesthesia and IV bag was placed underneath the neck and shoulders were taped down to expose the disc space. Excessive cervical extension was avoided.  Patient received preoperative antibiotics per protocol and baseline motor exam. Neck was prepped and draped in sterile manner. Fluoroscopy was used for this case.  All bony prominences were well padded.  Baseline neuro monitoring signals obtained prior to neck positioning.     Approximately 3 cm incision was made in the horizontal fashion.  Platysma was identified and incised in the plane of the muscle layer. Carotid sheath was identified and blunt dissection was made medial to the carotid sheath through the deep cervical fashion. Careful attention was made to protect the trachea, esophagus and neck viscera.  Dissection was performed and C4/5/6 end plate was identified.  Longus Coli muscles were elevated using bipolar caughtery from the anterior vertebral body from C4/5/6 and hemostasis maintained.     Next attention was paid to discectomy at C5-6.   Next, subtotal discectomy was performed at C5-6.  Using sha, posterior osteophyte was removed  Extensive endplate resection was performed to adequately decompress the disc space and spinal cord.  This was done mm at a time to prevent reach the epidural space with high speed sha.  Nerve hook used to confirm adequate canal decompression.  No gross loose fragments identified.  PLL was completely resected and significant decompression confirmed.  Next, trials were performed and Depuy/Ixtens biomechanical cage packed with harvested autograft and DBX mixed used.    Next attention was paid to C4*5 level. Next attention was paid to subtotal discectomy. Using sha posterior disc osteophyte was resected. Careful meticulous 1mm depth decortication was performed to avoid any injury to thecal sac.  After extensive end plate removal PLL was identified and resected using micro instruments. Significant endplate was removed from the superior and inferior endplate to confirm adequate decompression of the cord. At this time, trial size interbody graft was placed and its size confirmed on the radiographs. Biomechanical cage packed with large autograft from endplate resection and DBX.  Allograft was also added to the bone graft.  No neuromonitoring changes were noted.  Motors were performed at the times of distraction and after placement of trial graft and placement of final graft.  Bone wax was applied in C6 spencer pin hole.      Next attention was paid to instrumentation. C4/5/6 cages was secured into the vertebral body with screw/plate instrumentation at this level.     Hemostasis was maintained and confirmed at the end of surgery. No active bleeding was identified. Wound was irrigated with sterile saline with bacitracin. RAYMON drain was placed. Subcutaneous tissue was closed with number 3.0 vicryl in interrupted fashion. Skin was closed with monocryl and subsequently with dermabond.    Drain was secured with nylon stitch. Drain was actively draining and holding suction.    Sterile dressing was applied using gauge and tegaderm.    Patient was subsequently extubated. Patient was moving all 4 limbs.  Kimball collar applied.    Estimated Blood Loss 10 ml    Genet Parker  [employer] Signature  Genet Parker,     Electric signature  for submission to medical records.

## 2018-08-08 NOTE — CHART NOTE - NSCHARTNOTEFT_GEN_A_CORE
Dx: HNP    Sx: Anterior Cervical Disectomy and Fusion C4-6     Surgeons:  Dr. Parker    Med Cleared: pending Dr. Diaz Evaluation    H&P: pending    Vital Signs Last 24 Hrs  T(C): 36.8 (08 Aug 2018 07:26), Max: 36.8 (08 Aug 2018 07:26)  T(F): 98.3 (08 Aug 2018 07:26), Max: 98.3 (08 Aug 2018 07:26)  HR: 62 (08 Aug 2018 07:26) (62 - 62)  BP: 145/96 (08 Aug 2018 07:26) (145/96 - 145/96)  BP(mean): --  RR: 15 (08 Aug 2018 07:26) (15 - 15)  SpO2: 99% (08 Aug 2018 07:26) (99% - 99%)                          15.3   9.37  )-----------( 261      ( 08 Aug 2018 07:59 )             45.3     08-08    139  |  103  |  14  ----------------------------<  89  4.7   |  29  |  0.98    Ca    9.2      08 Aug 2018 07:59    TPro  8.6<H>  /  Alb  3.8  /  TBili  0.4  /  DBili  x   /  AST  25  /  ALT  83<H>  /  AlkPhos  93  08-08  PT/INR - ( 08 Aug 2018 07:59 )   PT: 11.2 sec;   INR: 1.04 ratio       PTT - ( 08 Aug 2018 07:59 )  PTT:33.6 sec    Type and Screen: done    UA:  neg    EKG: done    CXR: no Pacemaker      46yo M w/ HNP. To OR today for ACDF w/ Dr. Parker.    - Pain Control  - DVT PPx: SCDs, hold all chemical PPx at midnight  - NPO except for meds  - IVF while NPO  - Medical Clearance per Dr. Diaz  - Plan for OR    Pt discussed w/ Dr. Parker who agrees with the plan.

## 2018-08-08 NOTE — H&P ADULT - ATTENDING COMMENTS
Risks and benefits discussed with patient.  goals of improvement in quality of life discussed with patient and family.  Role of limited neuromonitoring discussed.  Patient with progressive neurologic symptoms with increased sensory changes and motor weakness.  Plan for surgical decompression and fusion C4-5 and C5-6.

## 2018-08-08 NOTE — ED ADULT NURSE NOTE - NSIMPLEMENTINTERV_GEN_ALL_ED
Implemented All Universal Safety Interventions:  [place] to call system. Call bell, personal items and telephone within reach. Instruct patient to call for assistance. Room bathroom lighting operational. Non-slip footwear when patient is off stretcher. Physically safe environment: no spills, clutter or unnecessary equipment. Stretcher in lowest position, wheels locked, appropriate side rails in place.

## 2018-08-08 NOTE — ED ADULT NURSE NOTE - OBJECTIVE STATEMENT
Patient reports lifting a heavy object at work a year ago and injuring his back. Patient previously had back surgery and had suffered with neck pain for a year.

## 2018-08-08 NOTE — ED PROVIDER NOTE - MEDICAL DECISION MAKING DETAILS
46 yo HM w/ chronic neck/lumbar back pain, 6 mos. s/p lumbar herniated dic sx, ambulatory for reported  Spine admission/sx re: worsening neck pain, abnl. MRI.  Plan: Pre-op labs, EKG, CXR, d/w Spine.

## 2018-08-08 NOTE — ED PROVIDER NOTE - CARE PLAN
Principal Discharge DX:	Herniated cervical disc  Secondary Diagnosis:	Radiculopathy of cervical spine

## 2018-08-09 LAB
ANION GAP SERPL CALC-SCNC: 8 MMOL/L — SIGNIFICANT CHANGE UP (ref 5–17)
BASOPHILS # BLD AUTO: 0.01 K/UL — SIGNIFICANT CHANGE UP (ref 0–0.2)
BASOPHILS NFR BLD AUTO: 0.1 % — SIGNIFICANT CHANGE UP (ref 0–2)
BUN SERPL-MCNC: 13 MG/DL — SIGNIFICANT CHANGE UP (ref 7–23)
CALCIUM SERPL-MCNC: 8.9 MG/DL — SIGNIFICANT CHANGE UP (ref 8.5–10.1)
CHLORIDE SERPL-SCNC: 103 MMOL/L — SIGNIFICANT CHANGE UP (ref 96–108)
CO2 SERPL-SCNC: 27 MMOL/L — SIGNIFICANT CHANGE UP (ref 22–31)
CREAT SERPL-MCNC: 0.89 MG/DL — SIGNIFICANT CHANGE UP (ref 0.5–1.3)
EOSINOPHIL # BLD AUTO: 0 K/UL — SIGNIFICANT CHANGE UP (ref 0–0.5)
EOSINOPHIL NFR BLD AUTO: 0 % — SIGNIFICANT CHANGE UP (ref 0–6)
GLUCOSE SERPL-MCNC: 118 MG/DL — HIGH (ref 70–99)
HCT VFR BLD CALC: 42.4 % — SIGNIFICANT CHANGE UP (ref 39–50)
HGB BLD-MCNC: 14.4 G/DL — SIGNIFICANT CHANGE UP (ref 13–17)
IMM GRANULOCYTES NFR BLD AUTO: 0.7 % — SIGNIFICANT CHANGE UP (ref 0–1.5)
LYMPHOCYTES # BLD AUTO: 1.25 K/UL — SIGNIFICANT CHANGE UP (ref 1–3.3)
LYMPHOCYTES # BLD AUTO: 9.3 % — LOW (ref 13–44)
MCHC RBC-ENTMCNC: 31 PG — SIGNIFICANT CHANGE UP (ref 27–34)
MCHC RBC-ENTMCNC: 34 GM/DL — SIGNIFICANT CHANGE UP (ref 32–36)
MCV RBC AUTO: 91.4 FL — SIGNIFICANT CHANGE UP (ref 80–100)
MONOCYTES # BLD AUTO: 0.89 K/UL — SIGNIFICANT CHANGE UP (ref 0–0.9)
MONOCYTES NFR BLD AUTO: 6.6 % — SIGNIFICANT CHANGE UP (ref 2–14)
NEUTROPHILS # BLD AUTO: 11.2 K/UL — HIGH (ref 1.8–7.4)
NEUTROPHILS NFR BLD AUTO: 83.3 % — HIGH (ref 43–77)
NRBC # BLD: 0 /100 WBCS — SIGNIFICANT CHANGE UP (ref 0–0)
PLATELET # BLD AUTO: 254 K/UL — SIGNIFICANT CHANGE UP (ref 150–400)
POTASSIUM SERPL-MCNC: 4.4 MMOL/L — SIGNIFICANT CHANGE UP (ref 3.5–5.3)
POTASSIUM SERPL-SCNC: 4.4 MMOL/L — SIGNIFICANT CHANGE UP (ref 3.5–5.3)
RBC # BLD: 4.64 M/UL — SIGNIFICANT CHANGE UP (ref 4.2–5.8)
RBC # FLD: 13.2 % — SIGNIFICANT CHANGE UP (ref 10.3–14.5)
SODIUM SERPL-SCNC: 138 MMOL/L — SIGNIFICANT CHANGE UP (ref 135–145)
WBC # BLD: 13.44 K/UL — HIGH (ref 3.8–10.5)
WBC # FLD AUTO: 13.44 K/UL — HIGH (ref 3.8–10.5)

## 2018-08-09 PROCEDURE — 72040 X-RAY EXAM NECK SPINE 2-3 VW: CPT | Mod: 26

## 2018-08-09 RX ADMIN — Medication 100 MILLIGRAM(S): at 17:02

## 2018-08-09 RX ADMIN — Medication 100 MILLIGRAM(S): at 06:29

## 2018-08-09 RX ADMIN — OXYCODONE HYDROCHLORIDE 10 MILLIGRAM(S): 5 TABLET ORAL at 20:37

## 2018-08-09 RX ADMIN — DIVALPROEX SODIUM 1000 MILLIGRAM(S): 500 TABLET, DELAYED RELEASE ORAL at 00:03

## 2018-08-09 RX ADMIN — Medication 100 MILLIGRAM(S): at 23:55

## 2018-08-09 RX ADMIN — DIVALPROEX SODIUM 1000 MILLIGRAM(S): 500 TABLET, DELAYED RELEASE ORAL at 17:25

## 2018-08-09 RX ADMIN — HYDROMORPHONE HYDROCHLORIDE 1 MILLIGRAM(S): 2 INJECTION INTRAMUSCULAR; INTRAVENOUS; SUBCUTANEOUS at 17:02

## 2018-08-09 RX ADMIN — Medication 100 MILLIGRAM(S): at 00:42

## 2018-08-09 RX ADMIN — CYCLOBENZAPRINE HYDROCHLORIDE 10 MILLIGRAM(S): 10 TABLET, FILM COATED ORAL at 00:03

## 2018-08-09 RX ADMIN — HYDROMORPHONE HYDROCHLORIDE 1 MILLIGRAM(S): 2 INJECTION INTRAMUSCULAR; INTRAVENOUS; SUBCUTANEOUS at 17:29

## 2018-08-09 RX ADMIN — CYCLOBENZAPRINE HYDROCHLORIDE 10 MILLIGRAM(S): 10 TABLET, FILM COATED ORAL at 21:04

## 2018-08-09 RX ADMIN — Medication 100 MILLIGRAM(S): at 00:03

## 2018-08-09 RX ADMIN — SENNA PLUS 2 TABLET(S): 8.6 TABLET ORAL at 21:04

## 2018-08-09 RX ADMIN — LEVETIRACETAM 1000 MILLIGRAM(S): 250 TABLET, FILM COATED ORAL at 06:25

## 2018-08-09 RX ADMIN — OXYCODONE HYDROCHLORIDE 10 MILLIGRAM(S): 5 TABLET ORAL at 03:32

## 2018-08-09 RX ADMIN — CYCLOBENZAPRINE HYDROCHLORIDE 10 MILLIGRAM(S): 10 TABLET, FILM COATED ORAL at 06:25

## 2018-08-09 RX ADMIN — Medication 1 TABLET(S): at 10:28

## 2018-08-09 RX ADMIN — OXYCODONE HYDROCHLORIDE 10 MILLIGRAM(S): 5 TABLET ORAL at 19:42

## 2018-08-09 RX ADMIN — OXYCODONE HYDROCHLORIDE 10 MILLIGRAM(S): 5 TABLET ORAL at 00:15

## 2018-08-09 RX ADMIN — CYCLOBENZAPRINE HYDROCHLORIDE 10 MILLIGRAM(S): 10 TABLET, FILM COATED ORAL at 13:10

## 2018-08-09 RX ADMIN — Medication 100 MILLIGRAM(S): at 13:10

## 2018-08-09 RX ADMIN — Medication 100 MILLIGRAM(S): at 21:04

## 2018-08-09 RX ADMIN — Medication 500 MILLIGRAM(S): at 17:25

## 2018-08-09 RX ADMIN — LEVETIRACETAM 1000 MILLIGRAM(S): 250 TABLET, FILM COATED ORAL at 17:25

## 2018-08-09 RX ADMIN — Medication 500 MILLIGRAM(S): at 06:29

## 2018-08-09 RX ADMIN — PANTOPRAZOLE SODIUM 40 MILLIGRAM(S): 20 TABLET, DELAYED RELEASE ORAL at 06:25

## 2018-08-09 RX ADMIN — Medication 1 MILLIGRAM(S): at 10:28

## 2018-08-09 RX ADMIN — LEVETIRACETAM 1000 MILLIGRAM(S): 250 TABLET, FILM COATED ORAL at 00:03

## 2018-08-09 RX ADMIN — DIVALPROEX SODIUM 1000 MILLIGRAM(S): 500 TABLET, DELAYED RELEASE ORAL at 06:25

## 2018-08-09 RX ADMIN — Medication 100 MILLIGRAM(S): at 09:25

## 2018-08-09 RX ADMIN — SODIUM CHLORIDE 75 MILLILITER(S): 9 INJECTION, SOLUTION INTRAVENOUS at 03:31

## 2018-08-09 NOTE — PHYSICAL THERAPY INITIAL EVALUATION ADULT - GENERAL OBSERVATIONS, REHAB EVAL
Pt rec'd supine in bed with wife present, Malawian speaking, MICHELA Liang present to help translate. Pt reports moderate incisional c/s pain, mild resolution of RUE radicular symptoms.

## 2018-08-09 NOTE — PHYSICAL THERAPY INITIAL EVALUATION ADULT - PERTINENT HX OF CURRENT PROBLEM, REHAB EVAL
46yo RHD Egyptian speaking M w/ neck and back pain since lifting heavy piece of metal approx 1.5y ago. Pt in the last 4 weeks had two separate episodes of worsening neurologic symptoms.  Pt informed that his right arm is getting worse and he has difficulty holding things on the right side.  Repeat MRI was performed at [facility] which showed flattening of the anterior spinal cord at C5-6 and central herniation with cord contact at C4-5.

## 2018-08-09 NOTE — PROGRESS NOTE ADULT - SUBJECTIVE AND OBJECTIVE BOX
CC- neck pain with numbness to RUE    HPI:  46yo/M with PMH seizure disorder presented with neck pain associated with numbness and tingling to RUE. Patient was seen by spine DR Parker and will need to go to OR later on today. Medical consult called for medical clearance. Patient does not have known cardiac history, he denies chest pain and dyspnea on exertion or rest.     PMH- as above  PSH- spine surgery  Soc hx- denies smoking, alcohol-socially  Fam hx- non-contributory    18- s/p ACDF, doing good    Review of system- All 10 systems reviewed and is as per HPI otherwise negative.     Vital Signs Last 24 Hrs  T(C): 36.9 (09 Aug 2018 03:12), Max: 36.9 (09 Aug 2018 03:12)  T(F): 98.4 (09 Aug 2018 03:12), Max: 98.4 (09 Aug 2018 03:12)  HR: 68 (09 Aug 2018 03:12) (68 - 95)  BP: 115/72 (09 Aug 2018 03:12) (115/72 - 136/88)  BP(mean): --  RR: 18 (09 Aug 2018 03:12) (10 - 18)  SpO2: 96% (09 Aug 2018 03:12) (94% - 98%)      LABS:                        14.4   13.44 )-----------( 254      ( 09 Aug 2018 05:45 )             42.4     09 Aug 2018 05:45    138    |  103    |  13     ----------------------------<  118    4.4     |  27     |  0.89     Ca    8.9        09 Aug 2018 05:45    TPro  8.6    /  Alb  3.8    /  TBili  0.4    /  DBili  x      /  AST  25     /  ALT  83     /  AlkPhos  93     08 Aug 2018 07:59    LIVER FUNCTIONS - ( 08 Aug 2018 07:59 )  Alb: 3.8 g/dL / Pro: 8.6 gm/dL / ALK PHOS: 93 U/L / ALT: 83 U/L / AST: 25 U/L / GGT: x           PT/INR - ( 08 Aug 2018 07:59 )   PT: 11.2 sec;   INR: 1.04 ratio      PTT - ( 08 Aug 2018 07:59 )  PTT:33.6 sec    Urinalysis Basic - ( 08 Aug 2018 11:00 )  Color: Yellow / Appearance: Clear / S.010 / pH: x  Gluc: x / Ketone: Negative  / Bili: Negative / Urobili: Negative mg/dL   Blood: x / Protein: Negative mg/dL / Nitrite: Negative   Leuk Esterase: Negative / RBC: x / WBC x   Sq Epi: x / Non Sq Epi: x / Bacteria: x    RADIOLOGY & ADDITIONAL TESTS:  EXAM:  XR CHEST AP OR PA 1V                        PROCEDURE DATE:  2018    INTERPRETATION:  Exam Date: 2018 9:34 AM    History: Preoperative evaluation    Technique: Single frontal portable view of the chest with no prior   studies available for comparison    Findings:    The heart is mildly enlarged versus artifact from AP projection.  The   lungs are grossly clear. The apices and hemidiaphragms are unremarkable.   Degenerative changes of the visualized osseous structures.    Impression:  No acute disease    PHYSICAL EXAM:  GENERAL: NAD, well-groomed, well-developed  HEAD:  Atraumatic, Normocephalic  EYES: EOMI, PERRLA, conjunctiva and sclera clear  HEENT: Moist mucous membranes  NECK: Supple, No JVD, dressing dry, +hemovac  NERVOUS SYSTEM:  Alert & Oriented X3, Motor Strength 5/5 B/L upper and lower extremities; DTRs 2+ intact and symmetric  CHEST/LUNG: Clear to auscultation bilaterally; No rales, rhonchi, wheezing, or rubs  HEART: Regular rate and rhythm; No murmurs, rubs, or gallops  ABDOMEN: Soft, Nontender, Nondistended; Bowel sounds present  GENITOURINARY- Voiding, no palpable bladder  EXTREMITIES:  2+ Peripheral Pulses, No clubbing, cyanosis, or edema  MUSCULOSKELTAL- No muscle tenderness, Muscle tone normal, No joint tenderness, no Joint swelling, Joint range of motion-normal  SKIN-no rash, no lesion  CNS- alert, oriented X3, non focal     Daily Height in cm: 167.64 (08 Aug 2018 07:23)      MEDICATIONS  (STANDING):  ascorbic acid 500 milliGRAM(s) Oral two times a day  ceFAZolin   IVPB 2000 milliGRAM(s) IV Intermittent every 8 hours  cyclobenzaprine 10 milliGRAM(s) Oral every 8 hours  diVALproex DR 1000 milliGRAM(s) Oral two times a day  docusate sodium 100 milliGRAM(s) Oral three times a day  folic acid 1 milliGRAM(s) Oral daily  lactated ringers. 1000 milliLiter(s) (75 mL/Hr) IV Continuous <Continuous>  levETIRAcetam 1000 milliGRAM(s) Oral two times a day  multivitamin 1 Tablet(s) Oral daily  pantoprazole    Tablet 40 milliGRAM(s) Oral before breakfast  senna 2 Tablet(s) Oral at bedtime    MEDICATIONS  (PRN):  acetaminophen   Tablet 650 milliGRAM(s) Oral every 6 hours PRN For Temp greater than 38 C (100.4 F)  acetaminophen   Tablet. 650 milliGRAM(s) Oral every 6 hours PRN headache  benzocaine 15 mG/menthol 3.6 mG Lozenge 1 Lozenge Oral every 3 hours PRN Sore Throat  diphenhydrAMINE   Capsule 25 milliGRAM(s) Oral at bedtime PRN Insomnia  diphenhydrAMINE   Injectable 12.5 milliGRAM(s) IV Push every 4 hours PRN Itching  HYDROmorphone  Injectable 1 milliGRAM(s) SubCutaneous every 6 hours PRN Severe Pain (7 - 10)  ondansetron   Disintegrating Tablet 4 milliGRAM(s) Oral every 4 hours PRN Nausea  oxyCODONE    IR 5 milliGRAM(s) Oral every 4 hours PRN Mild Pain (1 - 3)  oxyCODONE    IR 10 milliGRAM(s) Oral every 4 hours PRN Moderate Pain (4 - 6)    Assessment/Plan  #Cervical radiculopathy s/p ACDF  Spine f/u appreciated  Pain meds prn  PT as tolerated  Hemovac as per spine  Leukocytosis likely reactive postop  Monitor     #Seizure disorder  Cont Depakote and Keppra    #Dispo- ambulate, likely home in am

## 2018-08-09 NOTE — PROGRESS NOTE ADULT - SUBJECTIVE AND OBJECTIVE BOX
Pt seen and examined at bedside. Pain trolled. No overnight events      Vital Signs Last 24 Hrs  T(C): 36.9 (08-09-18 @ 03:12), Max: 36.9 (08-09-18 @ 03:12)  T(F): 98.4 (08-09-18 @ 03:12), Max: 98.4 (08-09-18 @ 03:12)  HR: 68 (08-09-18 @ 03:12) (62 - 95)  BP: 115/72 (08-09-18 @ 03:12) (115/72 - 136/88)  BP(mean): --  RR: 18 (08-09-18 @ 03:12) (10 - 18)  SpO2: 96% (08-09-18 @ 03:12) (94% - 98%)    Gen: NAD    Spine PE:  Skin intact  No gross deformity  No midnline TTP C/T/L/S spine  No bony step offs  No paraspinal muscle ttp/hypertonicity   Negative clonus  Negative babinski  Negative fox    Motor:                   C5                C6              C7               C8           T1   R            5/5                5/5            5/5             5/5          5/5  L             5/5               5/5             5/5             5/5          5/5                L2             L3             L4               L5            S1  R         5/5           5/5          5/5             5/5           5/5  L          5/5          5/5           5/5             5/5           5/5    Sensory:            C5         C6         C7      C8       T1        (0=absent, 1=impaired, 2=normal, NT=not testable)  R         2            2           2        2         2  L          2            2           2        2         2               L2          L3         L4      L5       S1         (0=absent, 1=impaired, 2=normal, NT=not testable)  R         2            2            2        2        2  L          2            2           2        2         2    Imaging: ???    A/P: 47y Male with ???  Pain control  WBAT with assistive devices as needed  FU Labs/imaging  SCDs

## 2018-08-10 ENCOUNTER — TRANSCRIPTION ENCOUNTER (OUTPATIENT)
Age: 48
End: 2018-08-10

## 2018-08-10 LAB
ANION GAP SERPL CALC-SCNC: 10 MMOL/L — SIGNIFICANT CHANGE UP (ref 5–17)
BASOPHILS # BLD AUTO: 0.03 K/UL — SIGNIFICANT CHANGE UP (ref 0–0.2)
BASOPHILS NFR BLD AUTO: 0.2 % — SIGNIFICANT CHANGE UP (ref 0–2)
BUN SERPL-MCNC: 14 MG/DL — SIGNIFICANT CHANGE UP (ref 7–23)
CALCIUM SERPL-MCNC: 8.7 MG/DL — SIGNIFICANT CHANGE UP (ref 8.5–10.1)
CHLORIDE SERPL-SCNC: 101 MMOL/L — SIGNIFICANT CHANGE UP (ref 96–108)
CO2 SERPL-SCNC: 28 MMOL/L — SIGNIFICANT CHANGE UP (ref 22–31)
CREAT SERPL-MCNC: 0.85 MG/DL — SIGNIFICANT CHANGE UP (ref 0.5–1.3)
EOSINOPHIL # BLD AUTO: 0.12 K/UL — SIGNIFICANT CHANGE UP (ref 0–0.5)
EOSINOPHIL NFR BLD AUTO: 0.9 % — SIGNIFICANT CHANGE UP (ref 0–6)
GLUCOSE SERPL-MCNC: 94 MG/DL — SIGNIFICANT CHANGE UP (ref 70–99)
HCT VFR BLD CALC: 42 % — SIGNIFICANT CHANGE UP (ref 39–50)
HGB BLD-MCNC: 14.1 G/DL — SIGNIFICANT CHANGE UP (ref 13–17)
IMM GRANULOCYTES NFR BLD AUTO: 0.5 % — SIGNIFICANT CHANGE UP (ref 0–1.5)
LYMPHOCYTES # BLD AUTO: 19.2 % — SIGNIFICANT CHANGE UP (ref 13–44)
LYMPHOCYTES # BLD AUTO: 2.54 K/UL — SIGNIFICANT CHANGE UP (ref 1–3.3)
MCHC RBC-ENTMCNC: 31.1 PG — SIGNIFICANT CHANGE UP (ref 27–34)
MCHC RBC-ENTMCNC: 33.6 GM/DL — SIGNIFICANT CHANGE UP (ref 32–36)
MCV RBC AUTO: 92.5 FL — SIGNIFICANT CHANGE UP (ref 80–100)
MONOCYTES # BLD AUTO: 1.25 K/UL — HIGH (ref 0–0.9)
MONOCYTES NFR BLD AUTO: 9.4 % — SIGNIFICANT CHANGE UP (ref 2–14)
NEUTROPHILS # BLD AUTO: 9.26 K/UL — HIGH (ref 1.8–7.4)
NEUTROPHILS NFR BLD AUTO: 69.8 % — SIGNIFICANT CHANGE UP (ref 43–77)
NRBC # BLD: 0 /100 WBCS — SIGNIFICANT CHANGE UP (ref 0–0)
PLATELET # BLD AUTO: 235 K/UL — SIGNIFICANT CHANGE UP (ref 150–400)
POTASSIUM SERPL-MCNC: 4.2 MMOL/L — SIGNIFICANT CHANGE UP (ref 3.5–5.3)
POTASSIUM SERPL-SCNC: 4.2 MMOL/L — SIGNIFICANT CHANGE UP (ref 3.5–5.3)
RBC # BLD: 4.54 M/UL — SIGNIFICANT CHANGE UP (ref 4.2–5.8)
RBC # FLD: 13.7 % — SIGNIFICANT CHANGE UP (ref 10.3–14.5)
SODIUM SERPL-SCNC: 139 MMOL/L — SIGNIFICANT CHANGE UP (ref 135–145)
WBC # BLD: 13.26 K/UL — HIGH (ref 3.8–10.5)
WBC # FLD AUTO: 13.26 K/UL — HIGH (ref 3.8–10.5)

## 2018-08-10 PROCEDURE — 71250 CT THORAX DX C-: CPT | Mod: 26

## 2018-08-10 PROCEDURE — 71045 X-RAY EXAM CHEST 1 VIEW: CPT | Mod: 26

## 2018-08-10 RX ORDER — ALBUTEROL 90 UG/1
2.5 AEROSOL, METERED ORAL
Qty: 0 | Refills: 0 | Status: DISCONTINUED | OUTPATIENT
Start: 2018-08-10 | End: 2018-08-13

## 2018-08-10 RX ORDER — DEXAMETHASONE 0.5 MG/5ML
10 ELIXIR ORAL ONCE
Qty: 0 | Refills: 0 | Status: DISCONTINUED | OUTPATIENT
Start: 2018-08-10 | End: 2018-08-10

## 2018-08-10 RX ORDER — DEXAMETHASONE 0.5 MG/5ML
10 ELIXIR ORAL EVERY 8 HOURS
Qty: 0 | Refills: 0 | Status: DISCONTINUED | OUTPATIENT
Start: 2018-08-10 | End: 2018-08-10

## 2018-08-10 RX ORDER — ALBUTEROL 90 UG/1
2.5 AEROSOL, METERED ORAL EVERY 6 HOURS
Qty: 0 | Refills: 0 | Status: DISCONTINUED | OUTPATIENT
Start: 2018-08-10 | End: 2018-08-13

## 2018-08-10 RX ORDER — DEXAMETHASONE 0.5 MG/5ML
10 ELIXIR ORAL ONCE
Qty: 0 | Refills: 0 | Status: COMPLETED | OUTPATIENT
Start: 2018-08-10 | End: 2018-08-10

## 2018-08-10 RX ORDER — HYDROCORTISONE 20 MG
25 TABLET ORAL EVERY 8 HOURS
Qty: 0 | Refills: 0 | Status: DISCONTINUED | OUTPATIENT
Start: 2018-08-10 | End: 2018-08-10

## 2018-08-10 RX ORDER — PIPERACILLIN AND TAZOBACTAM 4; .5 G/20ML; G/20ML
3.38 INJECTION, POWDER, LYOPHILIZED, FOR SOLUTION INTRAVENOUS EVERY 8 HOURS
Qty: 0 | Refills: 0 | Status: DISCONTINUED | OUTPATIENT
Start: 2018-08-10 | End: 2018-08-13

## 2018-08-10 RX ORDER — DEXAMETHASONE 0.5 MG/5ML
10 ELIXIR ORAL EVERY 8 HOURS
Qty: 0 | Refills: 0 | Status: DISCONTINUED | OUTPATIENT
Start: 2018-08-10 | End: 2018-08-11

## 2018-08-10 RX ADMIN — PIPERACILLIN AND TAZOBACTAM 25 GRAM(S): 4; .5 INJECTION, POWDER, LYOPHILIZED, FOR SOLUTION INTRAVENOUS at 21:48

## 2018-08-10 RX ADMIN — ALBUTEROL 2.5 MILLIGRAM(S): 90 AEROSOL, METERED ORAL at 20:57

## 2018-08-10 RX ADMIN — Medication 102 MILLIGRAM(S): at 15:49

## 2018-08-10 RX ADMIN — CYCLOBENZAPRINE HYDROCHLORIDE 10 MILLIGRAM(S): 10 TABLET, FILM COATED ORAL at 14:20

## 2018-08-10 RX ADMIN — DIVALPROEX SODIUM 1000 MILLIGRAM(S): 500 TABLET, DELAYED RELEASE ORAL at 05:17

## 2018-08-10 RX ADMIN — Medication 125 MILLIGRAM(S): at 07:47

## 2018-08-10 RX ADMIN — Medication 200 MILLIGRAM(S): at 21:49

## 2018-08-10 RX ADMIN — LEVETIRACETAM 1000 MILLIGRAM(S): 250 TABLET, FILM COATED ORAL at 05:17

## 2018-08-10 RX ADMIN — DIVALPROEX SODIUM 1000 MILLIGRAM(S): 500 TABLET, DELAYED RELEASE ORAL at 17:11

## 2018-08-10 RX ADMIN — Medication 200 MILLIGRAM(S): at 12:02

## 2018-08-10 RX ADMIN — PIPERACILLIN AND TAZOBACTAM 25 GRAM(S): 4; .5 INJECTION, POWDER, LYOPHILIZED, FOR SOLUTION INTRAVENOUS at 12:00

## 2018-08-10 RX ADMIN — OXYCODONE HYDROCHLORIDE 10 MILLIGRAM(S): 5 TABLET ORAL at 03:20

## 2018-08-10 RX ADMIN — Medication 102 MILLIGRAM(S): at 23:05

## 2018-08-10 RX ADMIN — Medication 100 MILLIGRAM(S): at 07:49

## 2018-08-10 RX ADMIN — LEVETIRACETAM 1000 MILLIGRAM(S): 250 TABLET, FILM COATED ORAL at 17:11

## 2018-08-10 NOTE — CONSULT NOTE ADULT - ASSESSMENT
46yo male with past medical history seizure disorder, spine surgery in past admitted on 8/8 for evaluation of progressive right arm weakness and neck pain; patient underwent cervical orthopedic surgery on 8/8; in morning of 8/10 patient developed hoarseness and cough with difficulty swallowing; rapid response was called and patient was hypoxic; evaluation revealed new pneumonia, most likely aspiration pneumonia.  1. Patient s/p ortho spine surgery, now with aspiration pneumonia  - follow up cultures   - iv hydration and supportive care   - serial cbc and monitor temperature   - reviewed prior medical records to evaluate for resistant or atypical pathogens   - wound care per surgery  - oxygen and nebs as needed   - agree with zosyn as ordered  - incentive spirometry  2. other issues; per medicine

## 2018-08-10 NOTE — SWALLOW BEDSIDE ASSESSMENT ADULT - SWALLOW EVAL: RECOMMENDED FEEDING/EATING TECHNIQUES
maintain upright posture during/after eating for 30 mins/small sips/bites/crush medication (when feasible)

## 2018-08-10 NOTE — DISCHARGE NOTE ADULT - MEDICATION SUMMARY - MEDICATIONS TO STOP TAKING
I will STOP taking the medications listed below when I get home from the hospital:     mg oral tablet  -- 1 tab(s) by mouth every 8 hours, with food  -- Do not take this drug if you are pregnant.  It is very important that you take or use this exactly as directed.  Do not skip doses or discontinue unless directed by your doctor.  May cause drowsiness or dizziness.  Obtain medical advice before taking any non-prescription drugs as some may affect the action of this medication.  Take with food or milk. I will STOP taking the medications listed below when I get home from the hospital:  None

## 2018-08-10 NOTE — SWALLOW BEDSIDE ASSESSMENT ADULT - ORAL PREPARATORY PHASE
Pt was oriented to feeding situ. He tended to accept PO in small volumes in anticipation of possible pharyngeal discomfort. Labial grading on utensils was functional.

## 2018-08-10 NOTE — SWALLOW BEDSIDE ASSESSMENT ADULT - SLP GENERAL OBSERVATIONS
The pt was seen at bedside, On encounter, drain was noted at site of his anterior neck. The pt was alert. He was anxious and impulsive but interactive. He was able to verbalize during communicative probes/in conversation. Pt's utterances were produced primarily in Maldivian with scattered English. His verbalizations were produced without a primary speech or primary linguistic pathology. Pt was able to effectively verbalize his intents on Maldivian.  Note that when asked about swallowing, pt stated that he feels as though food "gets stuck" in his throat at times since surgery. He also reported that he sometimes coughs when drinking fluids which is also began after recent spine surgery.

## 2018-08-10 NOTE — SWALLOW BEDSIDE ASSESSMENT ADULT - SWALLOW EVAL: RECOMMENDED DIET
SUGGEST A DYSPHAGIA 3 DIET WITH NECTAR THICK LIQUIDS AS THIS DIET CONSISTENCY BEST ACCOMMODATES HIS OROPHARYNGEAL SWALLOWING PROFILE AT THIS TIME.

## 2018-08-10 NOTE — DISCHARGE NOTE ADULT - PATIENT PORTAL LINK FT
You can access the [facility] Patient Portal, offered by [facility], by registering with the following website: [email or web address]

## 2018-08-10 NOTE — CHART NOTE - NSCHARTNOTEFT_GEN_A_CORE
Ortho:    Rapid Response called for low O2 sat in the 70s on RA.  Pt is s/p ACDF C4-6 POD #2 on no pharmacological anticoagulation  C/O dysphagia for the past ~12 hours that has not been worsening.  No SOB/cough/CP  Otherwise asymptomatic    At bedside: VSS with O295 RA and P74  Mild dysphonia   RAYMON Drain in place with good patency of tubing/no clots  DSG c/d/i  No obvious subcutaneous hematoma to palpation    IV Solumedrol and Cxr recommended by medical team   D/w Dr. Parker who is aware.   Will place on continuous pulse oximetry and close monitoring Ortho:    Rapid Response called for low O2 sat in the 70s on RA.  Pt is s/p ACDF C4-6 POD #2 on no pharmacological anticoagulation  C/O dysphagia for the past ~12 hours that has not been worsening.  No SOB/cough/CP  Otherwise asymptomatic    At bedside: VSS with O295 RA and P74  Mild dysphonia   RAYMON Drain in place with good patency of tubing/no clots  DSG c/d/i  No obvious subcutaneous hematoma to palpation  motor/sensation itnact and baseline to previous exam this AM    IV Solumedrol and Cxr recommended by medical team   Will place on continuous pulse oximetry and close monitoring    prelim CXR suspicious for aspiration, will FU official report  desat episode likely 2/2 to possible aspiration which is consitent of hx of dysphagia sp ACDF  close monitoring  will discuss with primary medical team this AM  D/w Dr. Parker who is aware. Ortho:    Rapid Response called for low O2 sat in the 70s on RA.  Pt is s/p ACDF C4-6 POD #2 on no pharmacological anticoagulation  C/O dysphagia for the past ~12 hours that has not been worsening.  No SOB/cough/CP  Otherwise asymptomatic    At bedside: VSS with O295 RA and P74  Mild dysphonia   RAYMON Drain in place with good patency of tubing/no clots  DSG c/d/i  No obvious subcutaneous hematoma to palpation  motor/sensation itnact and baseline to previous exam this AM    IV Solumedrol and Cxr recommended by medical team   Will place on continuous pulse oximetry and close monitoring    prelim CXR suspicious for aspiration, will FU official report  desat episode likely 2/2 to possible aspiration which is consitent of hx of dysphagia sp ACDF  close monitoring  will discuss with primary medical team this AM  D/w Dr. Parker who is aware.    Patient personally seen and examined.  Discussed with patient and spouse findings of aspiration pneumonia  Medicine and ID input appreciated.  Arm strength and sensation improved.  Need close monitoring for pneumonia.  Continue antibiotics  Will observe with medicine and ID team

## 2018-08-10 NOTE — DISCHARGE NOTE ADULT - MEDICATION SUMMARY - MEDICATIONS TO TAKE
I will START or STAY ON the medications listed below when I get home from the hospital:    Percocet 5/325 oral tablet  -- 1 tab(s) by mouth every 4 hours x 7 days  -for severe pain MDD:6   -- Caution federal law prohibits the transfer of this drug to any person other  than the person for whom it was prescribed.  May cause drowsiness.  Alcohol may intensify this effect.  Use care when operating dangerous machinery.  This prescription cannot be refilled.  This product contains acetaminophen.  Do not use  with any other product containing acetaminophen to prevent possible liver damage.  Using more of this medication than prescribed may cause serious breathing problems.    -- Indication: For pain    Keppra 750 mg oral tablet  -- 2 tab(s) by mouth 2 times a day  -- Indication: For Home med    divalproex sodium 500 mg oral delayed release tablet  -- 500 milligram(s) 2 tabs by mouth once a day  -- Indication: For Home med    divalproex sodium 500 mg oral delayed release tablet  -- 2 tab(s) by mouth 2 times a day  -- Indication: For Home med    Keppra 500 mg oral tablet  -- 2 tab(s) by mouth 2 times a day  -- Indication: For Home med    methocarbamol 750 mg oral tablet  -- 1 tab(s) by mouth 3 times a day  -- May cause drowsiness.  Alcohol may intensify this effect.  Use care when operating dangerous machinery.    -- Indication: For Home med    Augmentin 875 mg-125 mg oral tablet  -- 875 milligram(s) by mouth 2 times a day   -- Finish all this medication unless otherwise directed by prescriber.  Take with food or milk.    -- Indication: For Pneumonia ABx I will START or STAY ON the medications listed below when I get home from the hospital:    Percocet 5/325 oral tablet  -- 1 tab(s) by mouth every 4 hours x 7 days  -for severe pain MDD:6   -- Caution federal law prohibits the transfer of this drug to any person other  than the person for whom it was prescribed.  May cause drowsiness.  Alcohol may intensify this effect.  Use care when operating dangerous machinery.  This prescription cannot be refilled.  This product contains acetaminophen.  Do not use  with any other product containing acetaminophen to prevent possible liver damage.  Using more of this medication than prescribed may cause serious breathing problems.    -- Indication: For pain    divalproex sodium 500 mg oral delayed release tablet  -- 2 tab(s) by mouth 2 times a day  -- Indication: For Home med    Keppra 500 mg oral tablet  -- 2 tab(s) by mouth 2 times a day  -- Indication: For Home med    methocarbamol 750 mg oral tablet  -- 1 tab(s) by mouth 3 times a day  -- May cause drowsiness.  Alcohol may intensify this effect.  Use care when operating dangerous machinery.    -- Indication: For Home med    Augmentin 875 mg-125 mg oral tablet  -- 875 milligram(s) by mouth 2 times a day   -- Finish all this medication unless otherwise directed by prescriber.  Take with food or milk.    -- Indication: For Pneumonia ABx

## 2018-08-10 NOTE — CONSULT NOTE ADULT - SUBJECTIVE AND OBJECTIVE BOX
Patient is a 47y old  Male who presents with a chief complaint of Surgery: ACDF C4-6 (10 Aug 2018 05:34)    HPI:  46yo male with past medical history seizure disorder, spine surgery in past admitted on 8/8 for evaluation of progressive right arm weakness and neck pain; patient underwent cervical orthopedic surgery on 8/8; in morning of 8/10 patient developed hoarseness and cough with difficulty swallowing; rapid response was called and patient was hypoxic; evaluation revealed new pneumonia, most likely aspiration pneumonia.            PMH: as above  PSH: as above  Meds: per reconciliation sheet, noted below  MEDICATIONS  (STANDING):  ALBUTerol    0.083% 2.5 milliGRAM(s) Nebulizer every 6 hours  ascorbic acid 500 milliGRAM(s) Oral two times a day  cyclobenzaprine 10 milliGRAM(s) Oral every 8 hours  dexamethasone  IVPB 10 milliGRAM(s) IV Intermittent once  diVALproex DR 1000 milliGRAM(s) Oral two times a day  docusate sodium 100 milliGRAM(s) Oral three times a day  folic acid 1 milliGRAM(s) Oral daily  lactated ringers. 1000 milliLiter(s) (75 mL/Hr) IV Continuous <Continuous>  levETIRAcetam 1000 milliGRAM(s) Oral two times a day  multivitamin 1 Tablet(s) Oral daily  pantoprazole    Tablet 40 milliGRAM(s) Oral before breakfast  piperacillin/tazobactam IVPB. 3.375 Gram(s) IV Intermittent every 8 hours  senna 2 Tablet(s) Oral at bedtime    MEDICATIONS  (PRN):  acetaminophen   Tablet 650 milliGRAM(s) Oral every 6 hours PRN For Temp greater than 38 C (100.4 F)  acetaminophen   Tablet. 650 milliGRAM(s) Oral every 6 hours PRN headache  ALBUTerol    0.083% 2.5 milliGRAM(s) Nebulizer every 3 hours PRN Shortness of Breath and/or Wheezing  benzocaine 15 mG/menthol 3.6 mG Lozenge 1 Lozenge Oral every 3 hours PRN Sore Throat  diphenhydrAMINE   Capsule 25 milliGRAM(s) Oral at bedtime PRN Insomnia  diphenhydrAMINE   Injectable 12.5 milliGRAM(s) IV Push every 4 hours PRN Itching  guaiFENesin    Syrup 200 milliGRAM(s) Oral every 6 hours PRN Cough  HYDROmorphone  Injectable 1 milliGRAM(s) SubCutaneous every 6 hours PRN Severe Pain (7 - 10)  ondansetron   Disintegrating Tablet 4 milliGRAM(s) Oral every 4 hours PRN Nausea  oxyCODONE    IR 5 milliGRAM(s) Oral every 4 hours PRN Mild Pain (1 - 3)  oxyCODONE    IR 10 milliGRAM(s) Oral every 4 hours PRN Moderate Pain (4 - 6)    Allergies    No Known Allergies    Intolerances      Social: no smoking, no alcohol, no illegal drugs; no recent travel, no exposure to TB  FAMILY HISTORY:  No pertinent family history in first degree relatives     no history of premature cardiovascular disease in first degree relatives  ROS: the patient denies fever, no chills, no HA, no dizziness, no blurry vision, no CP, no palpitations, no SOB,  no abdominal pain, no diarrhea, no N/V, no dysuria, no leg pain, no claudication, no rash, no joint aches, no rectal pain or bleeding, no night sweats  All other systems reviewed and are negative    Vital Signs Last 24 Hrs  T(C): 36.9 (10 Aug 2018 10:32), Max: 37.2 (09 Aug 2018 17:11)  T(F): 98.5 (10 Aug 2018 10:32), Max: 98.9 (09 Aug 2018 17:11)  HR: 80 (10 Aug 2018 12:01) (65 - 112)  BP: 136/83 (10 Aug 2018 10:32) (121/75 - 136/83)  BP(mean): --  RR: 19 (10 Aug 2018 10:32) (18 - 19)  SpO2: 97% (10 Aug 2018 12:01) (94% - 100%)  Daily     Daily     PE:    Constitutional: frail looking  HEENT: NC/AT, EOMI, PERRLA, conjunctivae clear  Neck: supple; drain in anterior neck  Back: no tenderness  Respiratory: respiratory effort normal; scattered   Cardiovascular: S1S2 regular, no murmurs  Abdomen: soft, not tender, not distended, positive BS; no liver or spleen organomegaly  Genitourinary: no suprapubic tenderness  Musculoskeletal: no muscle tenderness, no joint swelling or tenderness  Neurological/ Psychiatric: AxOx3, judgement and insight normal;  moving all extremities  Skin: no rashes; no palpable lesions    Labs: all available labs reviewed                        14.1   13.26 )-----------( 235      ( 10 Aug 2018 06:32 )             42.0     08-10    139  |  101  |  14  ----------------------------<  94  4.2   |  28  |  0.85    Ca    8.7      10 Aug 2018 06:32       < from: CT Chest No Cont (08.10.18 @ 09:29) >    EXAM:  CT CHEST                            PROCEDURE DATE:  08/10/2018          INTERPRETATION:  Chest CT without contrast dated 8/10/2018.    COMPARISON: None available.    CLINICAL INFORMATION: Hypoxia.    TECHNIQUE: Contiguous axial 2.5 mm slicethickness images of the chest   were obtained without intravenous contrast administration.    FINDINGS:  The thyroid lobes are unremarkable.    The airway shows normal caliber and contour with patent lumen.    The lower end of a percutaneous drainagecatheter is visualized in the   left neck, with some soft tissue emphysema in the anterior left neck.     There is some infiltrates in the posterior medial right upper lobe. There   is consolidation of the superior segment of the right lower lobe. Some   atelectatic changes in the lingula. Consolidation of the posterior medial   segment of the left lower lobe and atelectatic changes in bilateral lower   lobes.    There is no pleural effusion or pneumothorax.    There are no mediastinal lymphadenopathy or masses.    The mediastinum great vessels are normal.     The heart is normal. There is no pericardial effusion.    A limited evaluation of the upper abdomen, fatty infiltration of the   liver.    The bones, the patient is status post surgicalfusion of the cervical   spine findings. Some multilevel degenerative disc disease in the mid   thoracic spine.    IMPRESSION: Bilateral pulmonary infiltrates and atelectasis as described,   likely postop in nature. Superimposed multifocal pneumonia is the   consideration and clinical correlation is recommended. Consider   aspiration as a possible cause.  Other findings as above.    < end of copied text >          Radiology: all available radiological tests reviewed    Advanced directives addressed: full resuscitation Patient is a 47y old  Male who presents with a chief complaint of Surgery: ACDF C4-6 (10 Aug 2018 05:34)    HPI:  48yo male with past medical history seizure disorder, spine surgery in past admitted on 8/8 for evaluation of progressive right arm weakness and neck pain; patient underwent cervical orthopedic surgery on 8/8; in morning of 8/10 patient developed hoarseness and cough with difficulty swallowing; rapid response was called and patient was hypoxic; evaluation revealed new pneumonia, most likely aspiration pneumonia.            PMH: as above  PSH: as above  Meds: per reconciliation sheet, noted below  MEDICATIONS  (STANDING):  ALBUTerol    0.083% 2.5 milliGRAM(s) Nebulizer every 6 hours  ascorbic acid 500 milliGRAM(s) Oral two times a day  cyclobenzaprine 10 milliGRAM(s) Oral every 8 hours  dexamethasone  IVPB 10 milliGRAM(s) IV Intermittent once  diVALproex DR 1000 milliGRAM(s) Oral two times a day  docusate sodium 100 milliGRAM(s) Oral three times a day  folic acid 1 milliGRAM(s) Oral daily  lactated ringers. 1000 milliLiter(s) (75 mL/Hr) IV Continuous <Continuous>  levETIRAcetam 1000 milliGRAM(s) Oral two times a day  multivitamin 1 Tablet(s) Oral daily  pantoprazole    Tablet 40 milliGRAM(s) Oral before breakfast  piperacillin/tazobactam IVPB. 3.375 Gram(s) IV Intermittent every 8 hours  senna 2 Tablet(s) Oral at bedtime    MEDICATIONS  (PRN):  acetaminophen   Tablet 650 milliGRAM(s) Oral every 6 hours PRN For Temp greater than 38 C (100.4 F)  acetaminophen   Tablet. 650 milliGRAM(s) Oral every 6 hours PRN headache  ALBUTerol    0.083% 2.5 milliGRAM(s) Nebulizer every 3 hours PRN Shortness of Breath and/or Wheezing  benzocaine 15 mG/menthol 3.6 mG Lozenge 1 Lozenge Oral every 3 hours PRN Sore Throat  diphenhydrAMINE   Capsule 25 milliGRAM(s) Oral at bedtime PRN Insomnia  diphenhydrAMINE   Injectable 12.5 milliGRAM(s) IV Push every 4 hours PRN Itching  guaiFENesin    Syrup 200 milliGRAM(s) Oral every 6 hours PRN Cough  HYDROmorphone  Injectable 1 milliGRAM(s) SubCutaneous every 6 hours PRN Severe Pain (7 - 10)  ondansetron   Disintegrating Tablet 4 milliGRAM(s) Oral every 4 hours PRN Nausea  oxyCODONE    IR 5 milliGRAM(s) Oral every 4 hours PRN Mild Pain (1 - 3)  oxyCODONE    IR 10 milliGRAM(s) Oral every 4 hours PRN Moderate Pain (4 - 6)    Allergies    No Known Allergies    Intolerances      Social: no smoking, no alcohol, no illegal drugs; no recent travel, no exposure to TB  FAMILY HISTORY:  No pertinent family history in first degree relatives     no history of premature cardiovascular disease in first degree relatives  ROS: the patient denies fever, no chills, no HA, no dizziness, no blurry vision, no CP, no palpitations, no SOB,  no abdominal pain, no diarrhea, no N/V, no dysuria, no leg pain, no claudication, no rash, no joint aches, no rectal pain or bleeding, no night sweats  All other systems reviewed and are negative    Vital Signs Last 24 Hrs  T(C): 36.9 (10 Aug 2018 10:32), Max: 37.2 (09 Aug 2018 17:11)  T(F): 98.5 (10 Aug 2018 10:32), Max: 98.9 (09 Aug 2018 17:11)  HR: 80 (10 Aug 2018 12:01) (65 - 112)  BP: 136/83 (10 Aug 2018 10:32) (121/75 - 136/83)  BP(mean): --  RR: 19 (10 Aug 2018 10:32) (18 - 19)  SpO2: 97% (10 Aug 2018 12:01) (94% - 100%)  Daily     Daily     PE:    Constitutional: frail looking  HEENT: NC/AT, EOMI, PERRLA, conjunctivae clear  Neck: supple; drain in anterior neck  Back: no tenderness  Respiratory: respiratory effort normal; scattered coarse breath sounds  Cardiovascular: S1S2 regular, no murmurs  Abdomen: soft, not tender, not distended, positive BS; no liver or spleen organomegaly  Genitourinary: no suprapubic tenderness  Musculoskeletal: no muscle tenderness, no joint swelling or tenderness  Neurological/ Psychiatric: AxOx3, judgement and insight normal;  moving all extremities  Skin: no rashes; no palpable lesions    Labs: all available labs reviewed                        14.1   13.26 )-----------( 235      ( 10 Aug 2018 06:32 )             42.0     08-10    139  |  101  |  14  ----------------------------<  94  4.2   |  28  |  0.85    Ca    8.7      10 Aug 2018 06:32       < from: CT Chest No Cont (08.10.18 @ 09:29) >    EXAM:  CT CHEST                            PROCEDURE DATE:  08/10/2018          INTERPRETATION:  Chest CT without contrast dated 8/10/2018.    COMPARISON: None available.    CLINICAL INFORMATION: Hypoxia.    TECHNIQUE: Contiguous axial 2.5 mm slicethickness images of the chest   were obtained without intravenous contrast administration.    FINDINGS:  The thyroid lobes are unremarkable.    The airway shows normal caliber and contour with patent lumen.    The lower end of a percutaneous drainagecatheter is visualized in the   left neck, with some soft tissue emphysema in the anterior left neck.     There is some infiltrates in the posterior medial right upper lobe. There   is consolidation of the superior segment of the right lower lobe. Some   atelectatic changes in the lingula. Consolidation of the posterior medial   segment of the left lower lobe and atelectatic changes in bilateral lower   lobes.    There is no pleural effusion or pneumothorax.    There are no mediastinal lymphadenopathy or masses.    The mediastinum great vessels are normal.     The heart is normal. There is no pericardial effusion.    A limited evaluation of the upper abdomen, fatty infiltration of the   liver.    The bones, the patient is status post surgicalfusion of the cervical   spine findings. Some multilevel degenerative disc disease in the mid   thoracic spine.    IMPRESSION: Bilateral pulmonary infiltrates and atelectasis as described,   likely postop in nature. Superimposed multifocal pneumonia is the   consideration and clinical correlation is recommended. Consider   aspiration as a possible cause.  Other findings as above.    < end of copied text >          Radiology: all available radiological tests reviewed    Advanced directives addressed: full resuscitation

## 2018-08-10 NOTE — DISCHARGE NOTE ADULT - ADDITIONAL INSTRUCTIONS
Call Dr. Parker for follow up appointment in 7 days.  Call your primary care doctor for pneumonia follow up to see him/her in the next 3-5 days at the latest.

## 2018-08-10 NOTE — SWALLOW BEDSIDE ASSESSMENT ADULT - COMMENTS
The patient was admitted to  for elective cervical spine surgery. In hospital he is s/p an ACDF in cervical spine region. Post op course is notable for leukocytosis, and an episode of hypoxia warranting a rapid response. This profile is notable for spinal stenosis with chronic neck pain/radiculopathy to RUE, seizure disorder and prior back surgery.

## 2018-08-10 NOTE — SWALLOW BEDSIDE ASSESSMENT ADULT - SWALLOW EVAL: FEEDING ASSISTANCE
PT WAS ABLE TO FEED SELF ON EXAM BUT HIS MOBILITY WAS REDUCED IN SETTING OF RECENT SPINE SURGERY. AS SUCH, HE MAY BENEFIT FROM TRAY SET UP/FEEDING AT TIMES. ASSIST SHOULD BE PROVIDED AS PER PT REQUEST.

## 2018-08-10 NOTE — DISCHARGE NOTE ADULT - HOSPITAL COURSE
Orthopedic Summary    H&P:  Pt is a 47y Male PAST MEDICAL & SURGICAL HISTORY:  Chronic back pain  Seizure disorder  Chronic neck pain  Seizure  No significant past surgical history  No significant past surgical history        Now s/p ACDF/TLIF/PLIF. Pt is afebrile with stable vital signs. Pain is controlled. Alert and Oriented. Exam reveals symmetricv 5/5 strength. Dressing is clean and dry with a new bandage on.  Vital Signs Last 24 Hrs  T(C): 36.5 (08-09-18 @ 23:35), Max: 37.2 (08-09-18 @ 17:11)  T(F): 97.7 (08-09-18 @ 23:35), Max: 98.9 (08-09-18 @ 17:11)  HR: 73 (08-09-18 @ 23:35) (65 - 73)  BP: 121/75 (08-09-18 @ 23:35) (121/75 - 123/40)  BP(mean): --  RR: 18 (08-09-18 @ 23:35) (18 - 18)  SpO2: 100% (08-09-18 @ 23:35) (100% - 100%)                        14.4   13.44 )-----------( 254      ( 09 Aug 2018 05:45 )             42.4     PT/INR - ( 08 Aug 2018 07:59 )   PT: 11.2 sec;   INR: 1.04 ratio         PTT - ( 08 Aug 2018 07:59 )  PTT:33.6 sec    Hospital Course:  Patient presented to [facility] after being medically cleared for the elective surgical procedure, having failed outpatient non-operative conservative management. Prophylactic antibiotics were started before the procedure and continued for 24 hours. They were admitted after surgery to the orthopedic floor.   There were no complications during the hospital stay.     Routine consults were obtained from Physical Therapy and from the Hospitalist for Medical Co-management. Pertinent home medications were continued.  Daily labs were followed.      POD 0 there were no overnight events. POD1 pt was transitioned to PO pain medication and pt was up OOB ambulating. On POD 1 or 2 the RAYMON drain was removed and dressing was changed. Labs were checked daily.  The pt is ready today for DC to home with home. eRX were sent for pain medication. The orthopedic Attending is aware and agrees. Orthopedic Summary    H&P:  Pt is a 47y Male PAST MEDICAL & SURGICAL HISTORY:  Chronic back pain  Seizure disorder  Chronic neck pain  Seizure  No significant past surgical history  No significant past surgical history        Now s/p ACDF/TLIF/PLIF. Pt is afebrile with stable vital signs. Pain is controlled. Alert and Oriented. Exam reveals symmetricv 5/5 strength. Dressing is clean and dry with a new bandage on.    Vital Signs Last 24 Hrs  T(C): 36.3 (13 Aug 2018 05:30), Max: 36.7 (12 Aug 2018 11:00)  T(F): 97.4 (13 Aug 2018 05:30), Max: 98.1 (12 Aug 2018 17:20)  HR: 67 (13 Aug 2018 05:30) (67 - 78)  BP: 134/87 (13 Aug 2018 05:30) (127/79 - 135/86)  BP(mean): 95 (12 Aug 2018 11:00) (95 - 95)  RR: 16 (13 Aug 2018 05:30) (15 - 16)  SpO2: 95% (13 Aug 2018 05:30) (95% - 99%)                          13.7   12.87 )-----------( 267      ( 12 Aug 2018 05:21 )             41.0       08-12    141  |  104  |  18  ----------------------------<  108<H>  4.4   |  30  |  0.78    Ca    9.2      12 Aug 2018 05:21    Hospital Course:  Patient presented to [facility] after being medically cleared for the elective surgical procedure, having failed outpatient non-operative conservative management. Prophylactic antibiotics were started before the procedure and continued for 24 hours. They were admitted after surgery to the orthopedic floor.   There were no complications during the hospital stay.     Routine consults were obtained from Physical Therapy and from the Hospitalist for Medical Co-management. Pertinent home medications were continued.  Daily labs were followed.      POD 0 there were no overnight events. POD 1 Pt was in stable condition. POD 2 a rapid response was called for hypoxia in the 70s. Pt was placed on nasal cannula and steroids. Chest XR and CT was significant for aspiration Pneumonia and patient was started on IV Antibiotics. POD 3 RAYMON drain was removed and dressing was changed.  Labs were checked daily. The pt is ready today for DC to home with home on PO antibiotics. eRX were sent for pain medication. The orthopedic Attending is aware and agrees. Orthopedic Summary    H&P:  Pt is a 47y Male PAST MEDICAL & SURGICAL HISTORY:  Chronic back pain  Seizure disorder  Chronic neck pain  Seizure  No significant past surgical history  No significant past surgical history        Now s/p ACDF/TLIF/PLIF. Pt is afebrile with stable vital signs. Pain is controlled. Alert and Oriented. Exam reveals symmetricv 5/5 strength. Dressing is clean and dry with a new bandage on.    Vital Signs Last 24 Hrs  T(C): 36.3 (13 Aug 2018 05:30), Max: 36.7 (12 Aug 2018 11:00)  T(F): 97.4 (13 Aug 2018 05:30), Max: 98.1 (12 Aug 2018 17:20)  HR: 67 (13 Aug 2018 05:30) (67 - 78)  BP: 134/87 (13 Aug 2018 05:30) (127/79 - 135/86)  BP(mean): 95 (12 Aug 2018 11:00) (95 - 95)  RR: 16 (13 Aug 2018 05:30) (15 - 16)  SpO2: 95% (13 Aug 2018 05:30) (95% - 99%)                          13.7   12.87 )-----------( 267      ( 12 Aug 2018 05:21 )             41.0       08-12    141  |  104  |  18  ----------------------------<  108<H>  4.4   |  30  |  0.78    Ca    9.2      12 Aug 2018 05:21    Hospital Course:  Patient presented to [facility] after being medically cleared for the elective surgical procedure, having failed outpatient non-operative conservative management. Prophylactic antibiotics were started before the procedure and continued for 24 hours. They were admitted after surgery to the orthopedic floor.   There were no complications during the hospital stay.     Routine consults were obtained from Physical Therapy and from the Hospitalist for Medical Co-management. Pertinent home medications were continued.  Daily labs were followed.      POD 0 there were no overnight events. POD 1 Pt was in stable condition. POD 2 a rapid response was called for hypoxia in the 70s. Pt was placed on nasal cannula and steroids. Chest XR and CT was significant for aspiration Pneumonia and patient was started on IV Antibiotics. POD 3 RAYMON drain was removed and dressing was changed.  Labs were checked daily. The pt is ready today for DC to home with home on PO antibiotics (augmentin x 7d). eRX were sent for pain medication. The orthopedic Attending is aware and agrees.

## 2018-08-10 NOTE — SWALLOW BEDSIDE ASSESSMENT ADULT - SWALLOW EVAL: SECRETION MANAGEMENT
No drooling noted but it should be indicated that her volitional cough was moist/produced with mildly reduced strength.

## 2018-08-10 NOTE — SWALLOW BEDSIDE ASSESSMENT ADULT - PHARYNGEAL PHASE
Swallow was triggered in an acceptable time frame for age but seemed somewhat effortful at times. Laryngeal lift on palpation during swallowing trials was mildly reduced but also felt to be functional with some of the above modified food consistencies. A repeat swallow after primary swallow was often noted when eating coarse solids which is likely due to reduced pharyngeal motility. Further, post prandial coughing was occasionally noted with thin liquids suspect for airway penetration despite cues to employ compensatory swallowing maneuvers. No behavioral aspiration signs demonstrated with nectar thick liquids, pureed foods and foods that require mastication. He did deny odynophagia.

## 2018-08-10 NOTE — DISCHARGE NOTE ADULT - CARE PLAN
Principal Discharge DX:	Radiculopathy of cervical spine  Goal:	Return to baseline ADLs  Assessment and plan of treatment:	1. Walk plenty  2. No lifting over 5 lbs  3. Keep bandage on, clean and dry. Change to a new one if gets wet or dirty. Ok to shower from waist down. Make sure you have a bar to hold onto in the shower. If you had low back surgery, sponge bathe until cleared by your surgeon to shower.  4. Pain meds: eRx sent to your pharmacy electronically, you need to pick it up  5. No driving on pain meds  6. See your surgeon in about 10 days in the office. Call to schedule. Principal Discharge DX:	Radiculopathy of cervical spine  Goal:	Return to baseline ADLs  Assessment and plan of treatment:	1. Walk plenty  2. No lifting over 5 lbs  3. Keep bandage on, clean and dry. Change to a new one if gets wet or dirty. Ok to shower from waist down. Make sure you have a bar to hold onto in the shower. If you had low back surgery, sponge bathe until cleared by your surgeon to shower.  4. Pain meds: eRx sent to your pharmacy electronically, you need to pick it up  5. No driving on pain meds  6. See your surgeon in about 10 days in the office. Call to schedule.  7. Please follow up with your primary care provider within one week after discharge for ongoing pneumonia evaluation. Principal Discharge DX:	Radiculopathy of cervical spine  Goal:	Return to baseline ADLs  Assessment and plan of treatment:	1. Walk plenty  2. No lifting over 5 lbs  3. Keep bandage on, clean and dry. Change to a new one if gets wet or dirty. Ok to shower from waist down. Make sure you have a bar to hold onto in the shower.  4. Pain meds: eRx sent to your pharmacy electronically, you need to pick it up  5. No driving on pain meds  6. FU Dr. Parker in about 10 days in the office. Call to schedule.  7. Please follow up with your primary care provider within one week after discharge for ongoing pneumonia evaluation.  8. Take your antibiotics as prescribed for the entire 7 day course. Do not stop them early unless instructed to do so.

## 2018-08-10 NOTE — PROGRESS NOTE ADULT - SUBJECTIVE AND OBJECTIVE BOX
CC- neck pain with numbness to RUE    HPI:  48yo/M with PMH seizure disorder presented with neck pain associated with numbness and tingling to RUE. Patient was seen by spine DR Parker and will need to go to OR later on today. Medical consult called for medical clearance. Patient does not have known cardiac history, he denies chest pain and dyspnea on exertion or rest.     PMH- as above  PSH- spine surgery  Soc hx- denies smoking, alcohol-socially  Fam hx- non-contributory    8/9/18- s/p ACDF, doing good  8/10/18- s/p RR earlier today. Patient developed hypoxia. CT chest- multifocal pneumonia    Review of system- All 10 systems reviewed and is as per HPI otherwise negative.     Vital Signs Last 24 Hrs  T(C): 36.9 (10 Aug 2018 10:32), Max: 37.2 (09 Aug 2018 17:11)  T(F): 98.5 (10 Aug 2018 10:32), Max: 98.9 (09 Aug 2018 17:11)  HR: 82 (10 Aug 2018 15:50) (65 - 112)  BP: 136/83 (10 Aug 2018 10:32) (121/75 - 136/83)  BP(mean): --  RR: 19 (10 Aug 2018 10:32) (18 - 19)  SpO2: 96% (10 Aug 2018 15:50) (94% - 100%)    LABS:                        14.1   13.26 )-----------( 235      ( 10 Aug 2018 06:32 )             42.0     10 Aug 2018 06:32    139    |  101    |  14     ----------------------------<  94     4.2     |  28     |  0.85     Ca    8.7        10 Aug 2018 06:32    RADIOLOGY & ADDITIONAL TESTS:  EXAM:  XR CHEST AP OR PA 1V                        PROCEDURE DATE:  08/08/2018    INTERPRETATION:  Exam Date: 8/8/2018 9:34 AM    History: Preoperative evaluation    Technique: Single frontal portable view of the chest with no prior   studies available for comparison    Findings:    The heart is mildly enlarged versus artifact from AP projection.  The   lungs are grossly clear. The apices and hemidiaphragms are unremarkable.   Degenerative changes of the visualized osseous structures.    Impression:  No acute disease    PHYSICAL EXAM:  GENERAL: NAD, well-groomed, well-developed  HEAD:  Atraumatic, Normocephalic  EYES: EOMI, PERRLA, conjunctiva and sclera clear  HEENT: Moist mucous membranes  NECK: Supple, No JVD, dressing dry, +hemovac  NERVOUS SYSTEM:  Alert & Oriented X3, Motor Strength 5/5 B/L upper and lower extremities; DTRs 2+ intact and symmetric  CHEST/LUNG: occasional rhonchi and wheezing  HEART: Regular rate and rhythm; No murmurs, rubs, or gallops  ABDOMEN: Soft, Nontender, Nondistended; Bowel sounds present  GENITOURINARY- Voiding, no palpable bladder  EXTREMITIES:  2+ Peripheral Pulses, No clubbing, cyanosis, or edema  MUSCULOSKELTAL- No muscle tenderness, Muscle tone normal, No joint tenderness, no Joint swelling, Joint range of motion-normal  SKIN-no rash, no lesion  CNS- alert, oriented X3, non focal     Daily Height in cm: 167.64 (08 Aug 2018 07:23)      MEDICATIONS  (STANDING):  ALBUTerol    0.083% 2.5 milliGRAM(s) Nebulizer every 6 hours  ascorbic acid 500 milliGRAM(s) Oral two times a day  cyclobenzaprine 10 milliGRAM(s) Oral every 8 hours  diVALproex DR 1000 milliGRAM(s) Oral two times a day  docusate sodium 100 milliGRAM(s) Oral three times a day  folic acid 1 milliGRAM(s) Oral daily  hydrocortisone sodium succinate Injectable 25 milliGRAM(s) IV Push every 8 hours  lactated ringers. 1000 milliLiter(s) (75 mL/Hr) IV Continuous <Continuous>  levETIRAcetam 1000 milliGRAM(s) Oral two times a day  multivitamin 1 Tablet(s) Oral daily  pantoprazole    Tablet 40 milliGRAM(s) Oral before breakfast  piperacillin/tazobactam IVPB. 3.375 Gram(s) IV Intermittent every 8 hours  senna 2 Tablet(s) Oral at bedtime    MEDICATIONS  (PRN):  acetaminophen   Tablet 650 milliGRAM(s) Oral every 6 hours PRN For Temp greater than 38 C (100.4 F)  acetaminophen   Tablet. 650 milliGRAM(s) Oral every 6 hours PRN headache  ALBUTerol    0.083% 2.5 milliGRAM(s) Nebulizer every 3 hours PRN Shortness of Breath and/or Wheezing  benzocaine 15 mG/menthol 3.6 mG Lozenge 1 Lozenge Oral every 3 hours PRN Sore Throat  diphenhydrAMINE   Capsule 25 milliGRAM(s) Oral at bedtime PRN Insomnia  diphenhydrAMINE   Injectable 12.5 milliGRAM(s) IV Push every 4 hours PRN Itching  guaiFENesin    Syrup 200 milliGRAM(s) Oral every 6 hours PRN Cough  HYDROmorphone  Injectable 1 milliGRAM(s) SubCutaneous every 6 hours PRN Severe Pain (7 - 10)  ondansetron   Disintegrating Tablet 4 milliGRAM(s) Oral every 4 hours PRN Nausea  oxyCODONE    IR 5 milliGRAM(s) Oral every 4 hours PRN Mild Pain (1 - 3)  oxyCODONE    IR 10 milliGRAM(s) Oral every 4 hours PRN Moderate Pain (4 - 6)    Assessment/Plan  #Cervical radiculopathy s/p ACDF  Spine f/u appreciated  Pain meds prn  PT as tolerated  Hemovac as per spine  Leukocytosis likely reactive postop  Monitor     #Hypoxia likely 2 to aspiration pneumonia  CT chest reviewed  Start on Zosyn- ID eval to optimize antibiotics coverage  Swallow eval appreciated- diet modified as recommended  Aspirating likely due to postop reactive swelling of the neck tissue  Iv Hydrocortisone 25mg q8h x3 doses  Nebs prn    #Seizure disorder  Cont Depakote and Keppra    #Dispo- IV abx and IV steroids

## 2018-08-10 NOTE — PROGRESS NOTE ADULT - SUBJECTIVE AND OBJECTIVE BOX
Pt seen and examined at bedside. Pain controlled. denies numbness or tingling. reports throat pain and difficulty swallowing pills. denies cp/sob.    Vital Signs Last 24 Hrs  T(C): 36.5 (08-09-18 @ 23:35), Max: 37.2 (08-09-18 @ 17:11)  T(F): 97.7 (08-09-18 @ 23:35), Max: 98.9 (08-09-18 @ 17:11)  HR: 73 (08-09-18 @ 23:35) (65 - 73)  BP: 121/75 (08-09-18 @ 23:35) (121/75 - 123/40)  BP(mean): --  RR: 18 (08-09-18 @ 23:35) (18 - 18)  SpO2: 100% (08-09-18 @ 23:35) (100% - 100%)    Gen: NAD, resting comfortably    Spine PE:  dante intact  anterior dressing c/d/i  No bony step offs  Negative fox    Motor:                   C5                C6              C7               C8           T1   R            5/5                5/5            5/5             5/5          5/5  L             5/5               5/5             5/5             5/5          5/5                L2             L3             L4               L5            S1  R         5/5           5/5          5/5             5/5           5/5  L          5/5          5/5           5/5             5/5           5/5    Sensory:            C5         C6         C7      C8       T1        (0=absent, 1=impaired, 2=normal, NT=not testable)  R         2            2           2        2         2  L          2            2           2        2         2               L2          L3         L4      L5       S1         (0=absent, 1=impaired, 2=normal, NT=not testable)  R         2            2            2        2        2  L          2            2           2        2         2    Imaging: ???    A/P: 47y Male with ???  Pain control  WBAT with assistive devices as needed  FU Labs/imaging  SCDs Pt seen and examined at bedside. Pain controlled. denies numbness or tingling. reports throat pain and difficulty swallowing pills. denies cp/sob.    Vital Signs Last 24 Hrs  T(C): 36.5 (08-09-18 @ 23:35), Max: 37.2 (08-09-18 @ 17:11)  T(F): 97.7 (08-09-18 @ 23:35), Max: 98.9 (08-09-18 @ 17:11)  HR: 73 (08-09-18 @ 23:35) (65 - 73)  BP: 121/75 (08-09-18 @ 23:35) (121/75 - 123/40)  BP(mean): --  RR: 18 (08-09-18 @ 23:35) (18 - 18)  SpO2: 100% (08-09-18 @ 23:35) (100% - 100%)    Gen: NAD, resting comfortably    Spine PE:  dante intact  anterior dressing c/d/i  No bony step offs  Negative fox    Motor:                   C5                C6              C7               C8           T1   R            5/5                5/5            5/5             5/5          5/5  L             5/5               5/5             5/5             5/5          5/5                L2             L3             L4               L5            S1  R         5/5           5/5          5/5             5/5           5/5  L          5/5          5/5           5/5             5/5           5/5    Sensory:            C5         C6         C7      C8       T1        (0=absent, 1=impaired, 2=normal, NT=not testable)  R         2            2           2        2         2  L          2            2           2        2         2               L2          L3         L4      L5       S1         (0=absent, 1=impaired, 2=normal, NT=not testable)  R         2            2            2        2        2  L          2            2           2        2         2

## 2018-08-10 NOTE — SWALLOW BEDSIDE ASSESSMENT ADULT - ADDITIONAL RECOMMENDATIONS
SUSPECT THAT PT WITH SOME EDEMA IN PREVERTEBRAL SPACE IN PHARYNGEAL REGION AT SITE OF RECENT CERVICAL SPINE SURGERY. THIS SHOULD IMPROVE SPONTANEOUSLY AS PT RECOVERS.  STEROIDS MAY EXPEDITE HIS RECOVERY. 1) SUSPECT THAT PT WITH SOME EDEMA IN PREVERTEBRAL SPACE IN PHARYNGEAL REGION AT SITE OF RECENT CERVICAL SPINE SURGERY. THIS SHOULD IMPROVE SPONTANEOUSLY AS PT RECOVERS.  STEROIDS MAY EXPEDITE HIS RECOVERY. I SPOKE WITH PA.    2) THE PATIENT'S PRIMARY LANGUAGE IS Syriac. USE  SERVICES AS NEEDED.

## 2018-08-10 NOTE — PROVIDER CONTACT NOTE (CHANGE IN STATUS NOTIFICATION) - SITUATION
Patient spouse came out of room notifying nurses that patient was having trouble breathing. Rapid response team called. pulse ox in low 80s; supplemental oxygen applied.

## 2018-08-10 NOTE — SWALLOW BEDSIDE ASSESSMENT ADULT - ASR SWALLOW RECOMMEND DIAG
DEFER MBS AS PT APPEARED CLINICALLY TOLERANT OF SUGGESTED PO TEXTURES FROM AN OROPHARYNGEAL SWALLOWING STANCE, DYSPHAGIA WITH A PROPENSITY TO FLUCTUATE IN SEVERITY GIVEN PROFILE AND CONSIDERING THAT HIS STIMULABILITY FOR EFFECTIVE USE OF COMPENSATORY SWALLOWING MANEUVERS WAS REDUCED.

## 2018-08-10 NOTE — DISCHARGE NOTE ADULT - PLAN OF CARE
Return to baseline ADLs 1. Walk plenty  2. No lifting over 5 lbs  3. Keep bandage on, clean and dry. Change to a new one if gets wet or dirty. Ok to shower from waist down. Make sure you have a bar to hold onto in the shower. If you had low back surgery, sponge bathe until cleared by your surgeon to shower.  4. Pain meds: eRx sent to your pharmacy electronically, you need to pick it up  5. No driving on pain meds  6. See your surgeon in about 10 days in the office. Call to schedule. 1. Walk plenty  2. No lifting over 5 lbs  3. Keep bandage on, clean and dry. Change to a new one if gets wet or dirty. Ok to shower from waist down. Make sure you have a bar to hold onto in the shower. If you had low back surgery, sponge bathe until cleared by your surgeon to shower.  4. Pain meds: eRx sent to your pharmacy electronically, you need to pick it up  5. No driving on pain meds  6. See your surgeon in about 10 days in the office. Call to schedule.  7. Please follow up with your primary care provider within one week after discharge for ongoing pneumonia evaluation. 1. Walk plenty  2. No lifting over 5 lbs  3. Keep bandage on, clean and dry. Change to a new one if gets wet or dirty. Ok to shower from waist down. Make sure you have a bar to hold onto in the shower.  4. Pain meds: eRx sent to your pharmacy electronically, you need to pick it up  5. No driving on pain meds  6. FU Dr. Parker in about 10 days in the office. Call to schedule.  7. Please follow up with your primary care provider within one week after discharge for ongoing pneumonia evaluation.  8. Take your antibiotics as prescribed for the entire 7 day course. Do not stop them early unless instructed to do so.

## 2018-08-10 NOTE — SWALLOW BEDSIDE ASSESSMENT ADULT - ORAL PHASE
Bolus formation/transfer were mechanically functional for age and he cleared oral debris on own after acceptable piecemeal oral processing.

## 2018-08-10 NOTE — DISCHARGE NOTE ADULT - CARE PROVIDER_API CALL
Genet Parker (DO), [facility]  [address] [place] [address]  Phone: [phone]  Fax: [phone]

## 2018-08-10 NOTE — PROVIDER CONTACT NOTE (CHANGE IN STATUS NOTIFICATION) - ACTION/TREATMENT ORDERED:
nebulizer treatment given; chest xray ordered. report given to next shift. will continue to monitor. 134

## 2018-08-10 NOTE — SWALLOW BEDSIDE ASSESSMENT ADULT - SWALLOW EVAL: DIAGNOSIS
1) The patient demonstrates age acceptable Oral Swallowing abilities atop Pharyngeal Dysphagia which subjectively appears to be a functional condition with a restricted inventory of modified food textures. Overt aspiration signs/post prandial coughing were demonstrated with thin liquids at times. Pharyngeal Dysphagia is in setting of recent cervical spine surgery with suspected edema in prevertebral soft tissues in pharyngeal region at site of surgery. Would expect that Pharyngeal Dysphagia will mary spontaneously as he recovers.  2) The pt was alert. He was anxious and impulsive but interactive. He was able to verbalize during communicative probes/in conversation. Pt's utterances were produced primarily in Colombian with scattered English. His verbalizations were produced without a primary speech or primary linguistic pathology. Pt was able to effectively verbalize his intents on Colombian.

## 2018-08-10 NOTE — CHART NOTE - NSCHARTNOTEFT_GEN_A_CORE
Rapid response called for hypoxia in the 70s.   Patient seen and examined at the bedside. Complaining of difficulty swallowing.   Placed on nasal cannula O2, vitals were stable.     PE  Gen: Patient in no acute distress with nasal cannula  Resp: No tachypnea or retractions.   CV: S1/S2  HEENT: RAYMON drain in place, draining serosanguineous fluid  Abd: Soft, non-tender  Peripheral: Pulses present    A/P  -IV solu-medrol 125mg once stat  -O2 supplementation as needed  -Continue to monitor airway  -CXR noted, follow official report  -Continuous pulse ox monitoring for now

## 2018-08-11 LAB
ANION GAP SERPL CALC-SCNC: 9 MMOL/L — SIGNIFICANT CHANGE UP (ref 5–17)
BASOPHILS # BLD AUTO: 0.02 K/UL — SIGNIFICANT CHANGE UP (ref 0–0.2)
BASOPHILS NFR BLD AUTO: 0.2 % — SIGNIFICANT CHANGE UP (ref 0–2)
BUN SERPL-MCNC: 15 MG/DL — SIGNIFICANT CHANGE UP (ref 7–23)
CALCIUM SERPL-MCNC: 9 MG/DL — SIGNIFICANT CHANGE UP (ref 8.5–10.1)
CHLORIDE SERPL-SCNC: 102 MMOL/L — SIGNIFICANT CHANGE UP (ref 96–108)
CO2 SERPL-SCNC: 29 MMOL/L — SIGNIFICANT CHANGE UP (ref 22–31)
CREAT SERPL-MCNC: 0.86 MG/DL — SIGNIFICANT CHANGE UP (ref 0.5–1.3)
EOSINOPHIL # BLD AUTO: 0 K/UL — SIGNIFICANT CHANGE UP (ref 0–0.5)
EOSINOPHIL NFR BLD AUTO: 0 % — SIGNIFICANT CHANGE UP (ref 0–6)
GLUCOSE SERPL-MCNC: 133 MG/DL — HIGH (ref 70–99)
HCT VFR BLD CALC: 41.1 % — SIGNIFICANT CHANGE UP (ref 39–50)
HGB BLD-MCNC: 13.7 G/DL — SIGNIFICANT CHANGE UP (ref 13–17)
IMM GRANULOCYTES NFR BLD AUTO: 0.6 % — SIGNIFICANT CHANGE UP (ref 0–1.5)
LYMPHOCYTES # BLD AUTO: 0.79 K/UL — LOW (ref 1–3.3)
LYMPHOCYTES # BLD AUTO: 6.3 % — LOW (ref 13–44)
MCHC RBC-ENTMCNC: 31 PG — SIGNIFICANT CHANGE UP (ref 27–34)
MCHC RBC-ENTMCNC: 33.3 GM/DL — SIGNIFICANT CHANGE UP (ref 32–36)
MCV RBC AUTO: 93 FL — SIGNIFICANT CHANGE UP (ref 80–100)
MONOCYTES # BLD AUTO: 0.87 K/UL — SIGNIFICANT CHANGE UP (ref 0–0.9)
MONOCYTES NFR BLD AUTO: 7 % — SIGNIFICANT CHANGE UP (ref 2–14)
NEUTROPHILS # BLD AUTO: 10.75 K/UL — HIGH (ref 1.8–7.4)
NEUTROPHILS NFR BLD AUTO: 85.9 % — HIGH (ref 43–77)
NRBC # BLD: 0 /100 WBCS — SIGNIFICANT CHANGE UP (ref 0–0)
PLATELET # BLD AUTO: 235 K/UL — SIGNIFICANT CHANGE UP (ref 150–400)
POTASSIUM SERPL-MCNC: 4.5 MMOL/L — SIGNIFICANT CHANGE UP (ref 3.5–5.3)
POTASSIUM SERPL-SCNC: 4.5 MMOL/L — SIGNIFICANT CHANGE UP (ref 3.5–5.3)
RBC # BLD: 4.42 M/UL — SIGNIFICANT CHANGE UP (ref 4.2–5.8)
RBC # FLD: 13.7 % — SIGNIFICANT CHANGE UP (ref 10.3–14.5)
SODIUM SERPL-SCNC: 140 MMOL/L — SIGNIFICANT CHANGE UP (ref 135–145)
WBC # BLD: 12.5 K/UL — HIGH (ref 3.8–10.5)
WBC # FLD AUTO: 12.5 K/UL — HIGH (ref 3.8–10.5)

## 2018-08-11 RX ORDER — DIVALPROEX SODIUM 500 MG/1
1000 TABLET, DELAYED RELEASE ORAL
Qty: 0 | Refills: 0 | Status: DISCONTINUED | OUTPATIENT
Start: 2018-08-11 | End: 2018-08-13

## 2018-08-11 RX ORDER — DEXAMETHASONE 0.5 MG/5ML
4 ELIXIR ORAL EVERY 8 HOURS
Qty: 0 | Refills: 0 | Status: DISCONTINUED | OUTPATIENT
Start: 2018-08-11 | End: 2018-08-12

## 2018-08-11 RX ORDER — PANTOPRAZOLE SODIUM 20 MG/1
40 TABLET, DELAYED RELEASE ORAL DAILY
Qty: 0 | Refills: 0 | Status: DISCONTINUED | OUTPATIENT
Start: 2018-08-11 | End: 2018-08-13

## 2018-08-11 RX ADMIN — LEVETIRACETAM 1000 MILLIGRAM(S): 250 TABLET, FILM COATED ORAL at 06:45

## 2018-08-11 RX ADMIN — LEVETIRACETAM 1000 MILLIGRAM(S): 250 TABLET, FILM COATED ORAL at 17:03

## 2018-08-11 RX ADMIN — PANTOPRAZOLE SODIUM 40 MILLIGRAM(S): 20 TABLET, DELAYED RELEASE ORAL at 11:14

## 2018-08-11 RX ADMIN — CYCLOBENZAPRINE HYDROCHLORIDE 10 MILLIGRAM(S): 10 TABLET, FILM COATED ORAL at 13:27

## 2018-08-11 RX ADMIN — ALBUTEROL 2.5 MILLIGRAM(S): 90 AEROSOL, METERED ORAL at 13:12

## 2018-08-11 RX ADMIN — Medication 1 TABLET(S): at 11:14

## 2018-08-11 RX ADMIN — ALBUTEROL 2.5 MILLIGRAM(S): 90 AEROSOL, METERED ORAL at 02:38

## 2018-08-11 RX ADMIN — PIPERACILLIN AND TAZOBACTAM 25 GRAM(S): 4; .5 INJECTION, POWDER, LYOPHILIZED, FOR SOLUTION INTRAVENOUS at 13:27

## 2018-08-11 RX ADMIN — ALBUTEROL 2.5 MILLIGRAM(S): 90 AEROSOL, METERED ORAL at 08:05

## 2018-08-11 RX ADMIN — PIPERACILLIN AND TAZOBACTAM 25 GRAM(S): 4; .5 INJECTION, POWDER, LYOPHILIZED, FOR SOLUTION INTRAVENOUS at 06:45

## 2018-08-11 RX ADMIN — CYCLOBENZAPRINE HYDROCHLORIDE 10 MILLIGRAM(S): 10 TABLET, FILM COATED ORAL at 21:21

## 2018-08-11 RX ADMIN — PIPERACILLIN AND TAZOBACTAM 25 GRAM(S): 4; .5 INJECTION, POWDER, LYOPHILIZED, FOR SOLUTION INTRAVENOUS at 21:21

## 2018-08-11 RX ADMIN — Medication 1 MILLIGRAM(S): at 11:14

## 2018-08-11 RX ADMIN — ALBUTEROL 2.5 MILLIGRAM(S): 90 AEROSOL, METERED ORAL at 20:47

## 2018-08-11 RX ADMIN — Medication 102 MILLIGRAM(S): at 06:45

## 2018-08-11 RX ADMIN — Medication 4 MILLIGRAM(S): at 11:15

## 2018-08-11 RX ADMIN — DIVALPROEX SODIUM 1000 MILLIGRAM(S): 500 TABLET, DELAYED RELEASE ORAL at 17:04

## 2018-08-11 RX ADMIN — Medication 4 MILLIGRAM(S): at 21:21

## 2018-08-11 NOTE — CONSULT NOTE ADULT - ASSESSMENT
PROBLEMS:    AC RESPIRATORY Failure with Hypoxia likely 2 to aspiration pneumonia  Basilar atelectasis  Upper airway local edema possible due to cervical surgery  Cervical radiculopathy s/p ACDF  H/O Seizure disorder    PLAN:    IV ZOSYN  INCENTIVE SPIROMETRY  IV DEXAMETASONE  supportive care  dvt prophylasix

## 2018-08-11 NOTE — PROGRESS NOTE ADULT - SUBJECTIVE AND OBJECTIVE BOX
CC- neck pain with numbness to RUE    HPI:  46yo/M with PMH seizure disorder presented with neck pain associated with numbness and tingling to RUE. Patient was seen by spine DR Parker and will need to go to OR later on today. Medical consult called for medical clearance. Patient does not have known cardiac history, he denies chest pain and dyspnea on exertion or rest.     PMH- as above  PSH- spine surgery  Soc hx- denies smoking, alcohol-socially  Fam hx- non-contributory    8/9/18- s/p ACDF, doing good  8/10/18- s/p RR earlier today. Patient developed hypoxia. CT chest- multifocal pneumonia  8/11/18 coughing when eating    Review of system- All 10 systems reviewed and is as per HPI otherwise negative.     Vital Signs Last 24 Hrs  T(C): 36.6 (11 Aug 2018 03:45), Max: 36.6 (11 Aug 2018 03:45)  T(F): 97.8 (11 Aug 2018 03:45), Max: 97.8 (11 Aug 2018 03:45)  HR: 78 (11 Aug 2018 08:06) (74 - 87)  BP: 120/72 (11 Aug 2018 03:45) (105/73 - 120/72)  BP(mean): --  RR: 18 (11 Aug 2018 03:45) (17 - 18)  SpO2: 97% (11 Aug 2018 03:45) (96% - 100%)    LABS:                               13.7   12.50 )-----------( 235      ( 11 Aug 2018 05:40 )             41.1     11 Aug 2018 05:40    140    |  102    |  15     ----------------------------<  133    4.5     |  29     |  0.86     Ca    9.0        11 Aug 2018 05:40    RADIOLOGY & ADDITIONAL TESTS:  EXAM:  XR CHEST AP OR PA 1V                        PROCEDURE DATE:  08/08/2018    INTERPRETATION:  Exam Date: 8/8/2018 9:34 AM    History: Preoperative evaluation    Technique: Single frontal portable view of the chest with no prior   studies available for comparison    Findings:    The heart is mildly enlarged versus artifact from AP projection.  The   lungs are grossly clear. The apices and hemidiaphragms are unremarkable.   Degenerative changes of the visualized osseous structures.    Impression:  No acute disease    PHYSICAL EXAM:  GENERAL: NAD, well-groomed, well-developed  HEAD:  Atraumatic, Normocephalic  EYES: EOMI, PERRLA, conjunctiva and sclera clear  HEENT: Moist mucous membranes  NECK: Supple, No JVD, dressing dry, +hemovac  NERVOUS SYSTEM:  Alert & Oriented X3, Motor Strength 5/5 B/L upper and lower extremities; DTRs 2+ intact and symmetric  CHEST/LUNG: occasional rhonchi and wheezing  HEART: Regular rate and rhythm; No murmurs, rubs, or gallops  ABDOMEN: Soft, Nontender, Nondistended; Bowel sounds present  GENITOURINARY- Voiding, no palpable bladder  EXTREMITIES:  2+ Peripheral Pulses, No clubbing, cyanosis, or edema  MUSCULOSKELTAL- No muscle tenderness, Muscle tone normal, No joint tenderness, no Joint swelling, Joint range of motion-normal  SKIN-no rash, no lesion  CNS- alert, oriented X3, non focal     Daily Height in cm: 167.64 (08 Aug 2018 07:23)      MEDICATIONS  (STANDING):  ALBUTerol    0.083% 2.5 milliGRAM(s) Nebulizer every 6 hours  ascorbic acid 500 milliGRAM(s) Oral two times a day  cyclobenzaprine 10 milliGRAM(s) Oral every 8 hours  dexamethasone  Injectable 4 milliGRAM(s) IV Push every 8 hours  diVALproex Sprinkle 1000 milliGRAM(s) Oral two times a day  docusate sodium 100 milliGRAM(s) Oral three times a day  folic acid 1 milliGRAM(s) Oral daily  lactated ringers. 1000 milliLiter(s) (75 mL/Hr) IV Continuous <Continuous>  levETIRAcetam 1000 milliGRAM(s) Oral two times a day  multivitamin 1 Tablet(s) Oral daily  pantoprazole    Tablet 40 milliGRAM(s) Oral before breakfast  pantoprazole  Injectable 40 milliGRAM(s) IV Push daily  piperacillin/tazobactam IVPB. 3.375 Gram(s) IV Intermittent every 8 hours  senna 2 Tablet(s) Oral at bedtime    MEDICATIONS  (PRN):  acetaminophen   Tablet 650 milliGRAM(s) Oral every 6 hours PRN For Temp greater than 38 C (100.4 F)  acetaminophen   Tablet. 650 milliGRAM(s) Oral every 6 hours PRN headache  ALBUTerol    0.083% 2.5 milliGRAM(s) Nebulizer every 3 hours PRN Shortness of Breath and/or Wheezing  benzocaine 15 mG/menthol 3.6 mG Lozenge 1 Lozenge Oral every 3 hours PRN Sore Throat  diphenhydrAMINE   Capsule 25 milliGRAM(s) Oral at bedtime PRN Insomnia  diphenhydrAMINE   Injectable 12.5 milliGRAM(s) IV Push every 4 hours PRN Itching  guaiFENesin    Syrup 200 milliGRAM(s) Oral every 6 hours PRN Cough  HYDROmorphone  Injectable 1 milliGRAM(s) SubCutaneous every 6 hours PRN Severe Pain (7 - 10)  ondansetron   Disintegrating Tablet 4 milliGRAM(s) Oral every 4 hours PRN Nausea  oxyCODONE    IR 5 milliGRAM(s) Oral every 4 hours PRN Mild Pain (1 - 3)  oxyCODONE    IR 10 milliGRAM(s) Oral every 4 hours PRN Moderate Pain (4 - 6)    Assessment/Plan  #Cervical radiculopathy s/p ACDF  Spine f/u appreciated  Pain meds prn  PT as tolerated  Hemovac as per spine  Leukocytosis likely reactive postop  Monitor HH    #Hypoxia likely 2 to aspiration pneumonia  CT chest reviewed  Start on Zosyn- ID f/u appreciated  Swallow eval appreciated- diet modified as recommended  Aspirating likely due to postop reactive swelling of the neck tissue  Cont IV steroids over the weekend  Nebs prn  Pulcharlie Dewey    #Seizure disorder  Cont Depakote and Keppra    #Dispo- IV abx and IV steroids over the weekend. Ambulate

## 2018-08-11 NOTE — CHART NOTE - NSCHARTNOTEFT_GEN_A_CORE
Pt s/e at bedside in stable condition, has intermittent cough otherwise breathing comfortably  RAYMON drain removed, new dressing applied, pt tolerated removal of drain

## 2018-08-11 NOTE — CONSULT NOTE ADULT - SUBJECTIVE AND OBJECTIVE BOX
HPI:  46yo RHD Kyrgyz speaking M w/ neck and back pain since lifting heavy piece of metal approx 1.5y ago. Pt in the last 4 weeks had two separate episodes of worsening neurologic sysmptoms with visits to emergency room at hospitals in the [place].  Patient called and informed that his right arm is getting worse and he has difficulty holding things on the right side.  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  Patient came to [facility] office yesterday with again reports of progressive increased pain, and right sided weakness.  Therefore, patient was advised to come to the ER to address his neurologic weakness.    Today, pt reports weakness, numbness and tingling in RUE as well as stabbing pain in his neck. Pain is at baseline. Denies recent falls/trauma. No food or drink since yesterday. (08 Aug 2018 12:29)      PAST MEDICAL & SURGICAL HISTORY:  Chronic back pain  Seizure disorder  Chronic neck pain  Seizure  No significant past surgical history  No significant past surgical history      Home Medications:  divalproex sodium 500 mg oral delayed release tablet: 2 tab(s) orally 2 times a day (08 Aug 2018 12:08)  divalproex sodium 500 mg oral delayed release tablet: 500 milligram(s) 2 tabs orally once a day (23 Jul 2018 19:22)  Keppra 500 mg oral tablet: 2 tab(s) orally 2 times a day (08 Aug 2018 12:08)  Keppra 750 mg oral tablet: 2 tab(s) orally 2 times a day (23 Jul 2018 19:22)      MEDICATIONS  (STANDING):  ALBUTerol    0.083% 2.5 milliGRAM(s) Nebulizer every 6 hours  ascorbic acid 500 milliGRAM(s) Oral two times a day  cyclobenzaprine 10 milliGRAM(s) Oral every 8 hours  dexamethasone  Injectable 4 milliGRAM(s) IV Push every 8 hours  diVALproex Sprinkle 1000 milliGRAM(s) Oral two times a day  docusate sodium 100 milliGRAM(s) Oral three times a day  folic acid 1 milliGRAM(s) Oral daily  lactated ringers. 1000 milliLiter(s) (75 mL/Hr) IV Continuous <Continuous>  levETIRAcetam 1000 milliGRAM(s) Oral two times a day  multivitamin 1 Tablet(s) Oral daily  pantoprazole    Tablet 40 milliGRAM(s) Oral before breakfast  pantoprazole  Injectable 40 milliGRAM(s) IV Push daily  piperacillin/tazobactam IVPB. 3.375 Gram(s) IV Intermittent every 8 hours  senna 2 Tablet(s) Oral at bedtime    MEDICATIONS  (PRN):  acetaminophen   Tablet 650 milliGRAM(s) Oral every 6 hours PRN For Temp greater than 38 C (100.4 F)  acetaminophen   Tablet. 650 milliGRAM(s) Oral every 6 hours PRN headache  ALBUTerol    0.083% 2.5 milliGRAM(s) Nebulizer every 3 hours PRN Shortness of Breath and/or Wheezing  benzocaine 15 mG/menthol 3.6 mG Lozenge 1 Lozenge Oral every 3 hours PRN Sore Throat  diphenhydrAMINE   Capsule 25 milliGRAM(s) Oral at bedtime PRN Insomnia  diphenhydrAMINE   Injectable 12.5 milliGRAM(s) IV Push every 4 hours PRN Itching  guaiFENesin    Syrup 200 milliGRAM(s) Oral every 6 hours PRN Cough  HYDROmorphone  Injectable 1 milliGRAM(s) SubCutaneous every 6 hours PRN Severe Pain (7 - 10)  ondansetron   Disintegrating Tablet 4 milliGRAM(s) Oral every 4 hours PRN Nausea  oxyCODONE    IR 5 milliGRAM(s) Oral every 4 hours PRN Mild Pain (1 - 3)  oxyCODONE    IR 10 milliGRAM(s) Oral every 4 hours PRN Moderate Pain (4 - 6)      Allergies    No Known Allergies    Intolerances        SOCIAL HISTORY: Denies tobacco, etoh abuse or illicit drug use    FAMILY HISTORY:  No pertinent family history in first degree relatives      Vital Signs Last 24 Hrs  T(C): 36.7 (11 Aug 2018 11:11), Max: 36.7 (11 Aug 2018 11:11)  T(F): 98.1 (11 Aug 2018 11:11), Max: 98.1 (11 Aug 2018 11:11)  HR: 84 (11 Aug 2018 13:13) (74 - 87)  BP: 128/84 (11 Aug 2018 11:11) (105/73 - 128/84)  BP(mean): 92 (11 Aug 2018 11:11) (92 - 92)  RR: 16 (11 Aug 2018 11:11) (16 - 18)  SpO2: 96% (11 Aug 2018 11:11) (96% - 100%)        REVIEW OF SYSTEMS:    CONSTITUTIONAL:  As per HPI.  HEENT:  Eyes:  No diplopia or blurred vision. ENT:  No earache, sore throat or runny nose.  CARDIOVASCULAR:  No pressure, squeezing, tightness, heaviness or aching about the chest, neck, axilla or epigastrium.  RESPIRATORY:  No cough, shortness of breath, PND or orthopnea.  GASTROINTESTINAL:  No nausea, vomiting or diarrhea.  GENITOURINARY:  No dysuria, frequency or urgency.  MUSCULOSKELETAL:  As per HPI.  SKIN:  No change in skin, hair or nails.  NEUROLOGIC:  No paresthesias, fasciculations, seizures or weakness.  PSYCHIATRIC:  No disorder of thought or mood.  ENDOCRINE:  No heat or cold intolerance, polyuria or polydipsia.  HEMATOLOGICAL:  No easy bruising or bleedings:  .     PHYSICAL EXAMINATION:    GENERAL APPEARANCE:  Pt. is not currently dyspneic, in no distress. Pt. is alert, oriented, and pleasant.  HEENT:  Pupils are normal and react normally. No icterus. Mucous membranes well colored.  NECK:  Supple. No lymphadenopathy. Jugular venous pressure not elevated. Carotids equal.   HEART:   The cardiac impulse has a normal quality. Regular. Normal S1 and S2. There are no murmurs, rubs or gallops noted  CHEST:  Chest is clear to auscultation. Normal respiratory effort.  ABDOMEN:  Soft and nontender.   EXTREMITIES:  There is no cyanosis, clubbing or edema.   SKIN:  No rash or significant lesions are noted.    LABS:                        13.7   12.50 )-----------( 235      ( 11 Aug 2018 05:40 )             41.1     08-11    140  |  102  |  15  ----------------------------<  133<H>  4.5   |  29  |  0.86    Ca    9.0      11 Aug 2018 05:40      RADIOLOGY & ADDITIONAL STUDIES:     CT Chest No Cont (08.10.18 @ 09:29) >  IMPRESSION: Bilateral pulmonary infiltrates and atelectasis as described,   likely postop in nature. Superimposed multifocal pneumonia is the   consideration and clinical correlation is recommended. Consider   aspiration as a possible cause.  Other findings as above.

## 2018-08-11 NOTE — PROGRESS NOTE ADULT - SUBJECTIVE AND OBJECTIVE BOX
Pt seen and examined at bedside. Pain controlled. denies numbness or tingling. Pt states he is breathing better than yesterday, c/o mild pain during swallowing. denies cp/sob.    Vital Signs Last 24 Hrs  T(C): 36.6 (11 Aug 2018 03:45), Max: 36.9 (10 Aug 2018 10:32)  T(F): 97.8 (11 Aug 2018 03:45), Max: 98.5 (10 Aug 2018 10:32)  HR: 87 (11 Aug 2018 03:45) (74 - 87)  BP: 120/72 (11 Aug 2018 03:45) (105/73 - 136/83)  BP(mean): --  RR: 18 (11 Aug 2018 03:45) (17 - 19)  SpO2: 97% (11 Aug 2018 03:45) (94% - 100%)    Gen: NAD, resting comfortably    Spine PE:  dante intact  anterior dressing c/d/i  No bony step offs  Negative fox    Motor:                   C5                C6              C7               C8           T1   R            5/5                5/5            5/5             5/5          5/5  L             5/5               5/5             5/5             5/5          5/5                L2             L3             L4               L5            S1  R         5/5           5/5          5/5             5/5           5/5  L          5/5          5/5           5/5             5/5           5/5    Sensory:            C5         C6         C7      C8       T1        (0=absent, 1=impaired, 2=normal, NT=not testable)  R         2            2           2        2         2  L          2            2           2        2         2               L2          L3         L4      L5       S1         (0=absent, 1=impaired, 2=normal, NT=not testable)  R         2            2            2        2        2  L          2            2           2        2         2        AP: 47M s/p ACDF C4-C6 POD3    Pain Control  DVT ppx  WBAT  PT  cepacol  abx   Incentive Spirometry  Medical management appreciated  FU DANTE output  DC planning  will d/w attending

## 2018-08-12 LAB
ANION GAP SERPL CALC-SCNC: 7 MMOL/L — SIGNIFICANT CHANGE UP (ref 5–17)
BUN SERPL-MCNC: 18 MG/DL — SIGNIFICANT CHANGE UP (ref 7–23)
CALCIUM SERPL-MCNC: 9.2 MG/DL — SIGNIFICANT CHANGE UP (ref 8.5–10.1)
CHLORIDE SERPL-SCNC: 104 MMOL/L — SIGNIFICANT CHANGE UP (ref 96–108)
CO2 SERPL-SCNC: 30 MMOL/L — SIGNIFICANT CHANGE UP (ref 22–31)
CREAT SERPL-MCNC: 0.78 MG/DL — SIGNIFICANT CHANGE UP (ref 0.5–1.3)
GLUCOSE SERPL-MCNC: 108 MG/DL — HIGH (ref 70–99)
HCT VFR BLD CALC: 41 % — SIGNIFICANT CHANGE UP (ref 39–50)
HGB BLD-MCNC: 13.7 G/DL — SIGNIFICANT CHANGE UP (ref 13–17)
MCHC RBC-ENTMCNC: 31 PG — SIGNIFICANT CHANGE UP (ref 27–34)
MCHC RBC-ENTMCNC: 33.4 GM/DL — SIGNIFICANT CHANGE UP (ref 32–36)
MCV RBC AUTO: 92.8 FL — SIGNIFICANT CHANGE UP (ref 80–100)
NRBC # BLD: 0 /100 WBCS — SIGNIFICANT CHANGE UP (ref 0–0)
PLATELET # BLD AUTO: 267 K/UL — SIGNIFICANT CHANGE UP (ref 150–400)
POTASSIUM SERPL-MCNC: 4.4 MMOL/L — SIGNIFICANT CHANGE UP (ref 3.5–5.3)
POTASSIUM SERPL-SCNC: 4.4 MMOL/L — SIGNIFICANT CHANGE UP (ref 3.5–5.3)
RBC # BLD: 4.42 M/UL — SIGNIFICANT CHANGE UP (ref 4.2–5.8)
RBC # FLD: 13.6 % — SIGNIFICANT CHANGE UP (ref 10.3–14.5)
SODIUM SERPL-SCNC: 141 MMOL/L — SIGNIFICANT CHANGE UP (ref 135–145)
WBC # BLD: 12.87 K/UL — HIGH (ref 3.8–10.5)
WBC # FLD AUTO: 12.87 K/UL — HIGH (ref 3.8–10.5)

## 2018-08-12 RX ORDER — DEXAMETHASONE 0.5 MG/5ML
4 ELIXIR ORAL EVERY 8 HOURS
Qty: 0 | Refills: 0 | Status: DISCONTINUED | OUTPATIENT
Start: 2018-08-12 | End: 2018-08-13

## 2018-08-12 RX ADMIN — PANTOPRAZOLE SODIUM 40 MILLIGRAM(S): 20 TABLET, DELAYED RELEASE ORAL at 11:52

## 2018-08-12 RX ADMIN — LEVETIRACETAM 1000 MILLIGRAM(S): 250 TABLET, FILM COATED ORAL at 17:11

## 2018-08-12 RX ADMIN — OXYCODONE HYDROCHLORIDE 10 MILLIGRAM(S): 5 TABLET ORAL at 22:26

## 2018-08-12 RX ADMIN — Medication 500 MILLIGRAM(S): at 05:17

## 2018-08-12 RX ADMIN — PIPERACILLIN AND TAZOBACTAM 25 GRAM(S): 4; .5 INJECTION, POWDER, LYOPHILIZED, FOR SOLUTION INTRAVENOUS at 13:47

## 2018-08-12 RX ADMIN — PANTOPRAZOLE SODIUM 40 MILLIGRAM(S): 20 TABLET, DELAYED RELEASE ORAL at 05:18

## 2018-08-12 RX ADMIN — Medication 1 TABLET(S): at 11:49

## 2018-08-12 RX ADMIN — DIVALPROEX SODIUM 1000 MILLIGRAM(S): 500 TABLET, DELAYED RELEASE ORAL at 05:18

## 2018-08-12 RX ADMIN — Medication 100 MILLIGRAM(S): at 13:47

## 2018-08-12 RX ADMIN — CYCLOBENZAPRINE HYDROCHLORIDE 10 MILLIGRAM(S): 10 TABLET, FILM COATED ORAL at 13:47

## 2018-08-12 RX ADMIN — OXYCODONE HYDROCHLORIDE 10 MILLIGRAM(S): 5 TABLET ORAL at 21:56

## 2018-08-12 RX ADMIN — Medication 500 MILLIGRAM(S): at 17:11

## 2018-08-12 RX ADMIN — Medication 4 MILLIGRAM(S): at 21:56

## 2018-08-12 RX ADMIN — PIPERACILLIN AND TAZOBACTAM 25 GRAM(S): 4; .5 INJECTION, POWDER, LYOPHILIZED, FOR SOLUTION INTRAVENOUS at 05:17

## 2018-08-12 RX ADMIN — DIVALPROEX SODIUM 1000 MILLIGRAM(S): 500 TABLET, DELAYED RELEASE ORAL at 17:12

## 2018-08-12 RX ADMIN — LEVETIRACETAM 1000 MILLIGRAM(S): 250 TABLET, FILM COATED ORAL at 05:17

## 2018-08-12 RX ADMIN — CYCLOBENZAPRINE HYDROCHLORIDE 10 MILLIGRAM(S): 10 TABLET, FILM COATED ORAL at 05:17

## 2018-08-12 RX ADMIN — PIPERACILLIN AND TAZOBACTAM 25 GRAM(S): 4; .5 INJECTION, POWDER, LYOPHILIZED, FOR SOLUTION INTRAVENOUS at 21:53

## 2018-08-12 RX ADMIN — CYCLOBENZAPRINE HYDROCHLORIDE 10 MILLIGRAM(S): 10 TABLET, FILM COATED ORAL at 21:56

## 2018-08-12 RX ADMIN — Medication 4 MILLIGRAM(S): at 05:25

## 2018-08-12 RX ADMIN — Medication 4 MILLIGRAM(S): at 13:47

## 2018-08-12 RX ADMIN — ALBUTEROL 2.5 MILLIGRAM(S): 90 AEROSOL, METERED ORAL at 19:52

## 2018-08-12 RX ADMIN — Medication 1 MILLIGRAM(S): at 11:49

## 2018-08-12 RX ADMIN — ALBUTEROL 2.5 MILLIGRAM(S): 90 AEROSOL, METERED ORAL at 13:27

## 2018-08-12 NOTE — PROGRESS NOTE ADULT - SUBJECTIVE AND OBJECTIVE BOX
Patient is a 47y old  Male who presents with a chief complaint of Surgery: ACDF C4-6 (10 Aug 2018 05:34)    Date of service: 08-12-18 @ 12:04      Patient comfortable  Minor incisional pain      ROS: no fever or chills; denies dizziness, no HA, no SOB or cough, no abdominal pain, no diarrhea or constipation; no dysuria, no urinary frequency, no legs pain, no rashes    MEDICATIONS  (STANDING):  ALBUTerol    0.083% 2.5 milliGRAM(s) Nebulizer every 6 hours  ascorbic acid 500 milliGRAM(s) Oral two times a day  cyclobenzaprine 10 milliGRAM(s) Oral every 8 hours  dexamethasone  Injectable 4 milliGRAM(s) IV Push every 8 hours  diVALproex Sprinkle 1000 milliGRAM(s) Oral two times a day  docusate sodium 100 milliGRAM(s) Oral three times a day  folic acid 1 milliGRAM(s) Oral daily  lactated ringers. 1000 milliLiter(s) (75 mL/Hr) IV Continuous <Continuous>  levETIRAcetam 1000 milliGRAM(s) Oral two times a day  multivitamin 1 Tablet(s) Oral daily  pantoprazole    Tablet 40 milliGRAM(s) Oral before breakfast  pantoprazole  Injectable 40 milliGRAM(s) IV Push daily  piperacillin/tazobactam IVPB. 3.375 Gram(s) IV Intermittent every 8 hours  senna 2 Tablet(s) Oral at bedtime    MEDICATIONS  (PRN):  acetaminophen   Tablet 650 milliGRAM(s) Oral every 6 hours PRN For Temp greater than 38 C (100.4 F)  acetaminophen   Tablet. 650 milliGRAM(s) Oral every 6 hours PRN headache  ALBUTerol    0.083% 2.5 milliGRAM(s) Nebulizer every 3 hours PRN Shortness of Breath and/or Wheezing  benzocaine 15 mG/menthol 3.6 mG Lozenge 1 Lozenge Oral every 3 hours PRN Sore Throat  diphenhydrAMINE   Capsule 25 milliGRAM(s) Oral at bedtime PRN Insomnia  diphenhydrAMINE   Injectable 12.5 milliGRAM(s) IV Push every 4 hours PRN Itching  guaiFENesin    Syrup 200 milliGRAM(s) Oral every 6 hours PRN Cough  HYDROmorphone  Injectable 1 milliGRAM(s) SubCutaneous every 6 hours PRN Severe Pain (7 - 10)  ondansetron   Disintegrating Tablet 4 milliGRAM(s) Oral every 4 hours PRN Nausea  oxyCODONE    IR 5 milliGRAM(s) Oral every 4 hours PRN Mild Pain (1 - 3)  oxyCODONE    IR 10 milliGRAM(s) Oral every 4 hours PRN Moderate Pain (4 - 6)      Vital Signs Last 24 Hrs  T(C): 36.7 (12 Aug 2018 11:00), Max: 36.7 (12 Aug 2018 11:00)  T(F): 98 (12 Aug 2018 11:00), Max: 98 (12 Aug 2018 11:00)  HR: 75 (12 Aug 2018 11:00) (67 - 84)  BP: 135/86 (12 Aug 2018 11:00) (114/87 - 135/86)  BP(mean): 95 (12 Aug 2018 11:00) (95 - 95)  RR: 15 (12 Aug 2018 11:00) (15 - 17)  SpO2: 99% (12 Aug 2018 11:00) (96% - 99%)    Physical Exam:        Constitutional: frail looking  HEENT: NC/AT, EOMI, PERRLA, conjunctivae clear  Neck: supple; drain in anterior neck removed  Back: no tenderness  Respiratory: respiratory effort normal; scattered coarse breath sounds  Cardiovascular: S1S2 regular, no murmurs  Abdomen: soft, not tender, not distended, positive BS; no liver or spleen organomegaly  Genitourinary: no suprapubic tenderness  Musculoskeletal: no muscle tenderness, no joint swelling or tenderness  Neurological/ Psychiatric: AxOx3, judgement and insight normal;  moving all extremities  Skin: no rashes; no palpable lesions    Labs: all available labs reviewed                        Labs:                       Labs:                        13.7   12.87 )-----------( 267      ( 12 Aug 2018 05:21 )             41.0     08-12    141  |  104  |  18  ----------------------------<  108<H>  4.4   |  30  |  0.78    Ca    9.2      12 Aug 2018 05:21             Cultures:                Cultures:            < from: CT Chest No Cont (08.10.18 @ 09:29) >    EXAM:  CT CHEST                            PROCEDURE DATE:  08/10/2018          INTERPRETATION:  Chest CT without contrast dated 8/10/2018.    COMPARISON: None available.    CLINICAL INFORMATION: Hypoxia.    TECHNIQUE: Contiguous axial 2.5 mm slicethickness images of the chest   were obtained without intravenous contrast administration.    FINDINGS:  The thyroid lobes are unremarkable.    The airway shows normal caliber and contour with patent lumen.    The lower end of a percutaneous drainagecatheter is visualized in the   left neck, with some soft tissue emphysema in the anterior left neck.     There is some infiltrates in the posterior medial right upper lobe. There   is consolidation of the superior segment of the right lower lobe. Some   atelectatic changes in the lingula. Consolidation of the posterior medial   segment of the left lower lobe and atelectatic changes in bilateral lower   lobes.    There is no pleural effusion or pneumothorax.    There are no mediastinal lymphadenopathy or masses.    The mediastinum great vessels are normal.     The heart is normal. There is no pericardial effusion.    A limited evaluation of the upper abdomen, fatty infiltration of the   liver.    The bones, the patient is status post surgicalfusion of the cervical   spine findings. Some multilevel degenerative disc disease in the mid   thoracic spine.    IMPRESSION: Bilateral pulmonary infiltrates and atelectasis as described,   likely postop in nature. Superimposed multifocal pneumonia is the   consideration and clinical correlation is recommended. Consider   aspiration as a possible cause.  Other findings as above.    < end of copied text >          Radiology: all available radiological tests reviewed    Advanced directives addressed: full resuscitation

## 2018-08-12 NOTE — PROGRESS NOTE ADULT - SUBJECTIVE AND OBJECTIVE BOX
Pt seen and examined at bedside. Pain controlled. denies numbness or tingling. Pt c/o mild pain during swallowing. Denies cp/sob and endorses pain at surgical site.     Vital Signs Last 24 Hrs  T(C): 36.3 (12 Aug 2018 05:11), Max: 36.7 (11 Aug 2018 11:11)  T(F): 97.3 (12 Aug 2018 05:11), Max: 98.1 (11 Aug 2018 11:11)  HR: 67 (12 Aug 2018 05:11) (67 - 85)  BP: 128/88 (12 Aug 2018 05:11) (114/87 - 128/88)  BP(mean): 92 (11 Aug 2018 11:11) (92 - 92)  RR: 16 (12 Aug 2018 05:11) (16 - 17)  SpO2: 96% (12 Aug 2018 05:11) (96% - 98%)    Gen: NAD, resting comfortably    Spine PE:  Dressing C/D/I  No upper motor neuron signs, Neg Abernathy/babinski    Motor:                   C5                C6              C7               C8           T1   R            5/5                5/5            5/5             5/5          5/5  L             5/5               5/5             5/5             5/5          5/5                L2             L3             L4               L5            S1  R         5/5           5/5          5/5             5/5           5/5  L          5/5          5/5           5/5             5/5           5/5    Sensory:            C5         C6         C7      C8       T1        (0=absent, 1=impaired, 2=normal, NT=not testable)  R         2            2           2        2         2  L          2            2           2        2         2               L2          L3         L4      L5       S1         (0=absent, 1=impaired, 2=normal, NT=not testable)  R         2            2            2        2        2  L          2            2           2        2         2

## 2018-08-12 NOTE — PROGRESS NOTE ADULT - SUBJECTIVE AND OBJECTIVE BOX
CC- neck pain with numbness to RUE    HPI:  48yo/M with PMH seizure disorder presented with neck pain associated with numbness and tingling to RUE. Patient was seen by spine DR Parker and will need to go to OR later on today. Medical consult called for medical clearance. Patient does not have known cardiac history, he denies chest pain and dyspnea on exertion or rest.     PMH- as above  PSH- spine surgery  Soc hx- denies smoking, alcohol-socially  Fam hx- non-contributory    8/9/18- s/p ACDF, doing good  8/10/18- s/p RR earlier today. Patient developed hypoxia. CT chest- multifocal pneumonia  8/11/18 coughing when eating  8/12/18 wheezing but improving    Review of system- All 10 systems reviewed and is as per HPI otherwise negative.     Vital Signs Last 24 Hrs  T(C): 36.7 (12 Aug 2018 11:00), Max: 36.7 (12 Aug 2018 11:00)  T(F): 98 (12 Aug 2018 11:00), Max: 98 (12 Aug 2018 11:00)  HR: 75 (12 Aug 2018 11:00) (67 - 84)  BP: 135/86 (12 Aug 2018 11:00) (114/87 - 135/86)  BP(mean): 95 (12 Aug 2018 11:00) (95 - 95)  RR: 15 (12 Aug 2018 11:00) (15 - 17)  SpO2: 99% (12 Aug 2018 11:00) (96% - 99%)    LABS:                                        13.7   12.87 )-----------( 267      ( 12 Aug 2018 05:21 )             41.0     12 Aug 2018 05:21    141    |  104    |  18     ----------------------------<  108    4.4     |  30     |  0.78     Ca    9.2        12 Aug 2018 05:21    RADIOLOGY & ADDITIONAL TESTS:  EXAM:  XR CHEST AP OR PA 1V                        PROCEDURE DATE:  08/08/2018    INTERPRETATION:  Exam Date: 8/8/2018 9:34 AM    History: Preoperative evaluation    Technique: Single frontal portable view of the chest with no prior   studies available for comparison    Findings:    The heart is mildly enlarged versus artifact from AP projection.  The   lungs are grossly clear. The apices and hemidiaphragms are unremarkable.   Degenerative changes of the visualized osseous structures.    Impression:  No acute disease    PHYSICAL EXAM:  GENERAL: NAD, well-groomed, well-developed  HEAD:  Atraumatic, Normocephalic  EYES: EOMI, PERRLA, conjunctiva and sclera clear  HEENT: Moist mucous membranes  NECK: Supple, No JVD, dressing dry, hemovac removed  NERVOUS SYSTEM:  Alert & Oriented X3, Motor Strength 5/5 B/L upper and lower extremities; DTRs 2+ intact and symmetric  CHEST/LUNG: occasional rhonchi and wheezing  HEART: Regular rate and rhythm; No murmurs, rubs, or gallops  ABDOMEN: Soft, Nontender, Nondistended; Bowel sounds present  GENITOURINARY- Voiding, no palpable bladder  EXTREMITIES:  2+ Peripheral Pulses, No clubbing, cyanosis, or edema  MUSCULOSKELTAL- No muscle tenderness, Muscle tone normal, No joint tenderness, no Joint swelling, Joint range of motion-normal  SKIN-no rash, no lesion  CNS- alert, oriented X3, non focal     Daily Height in cm: 167.64 (08 Aug 2018 07:23)      MEDICATIONS  (STANDING):  ALBUTerol    0.083% 2.5 milliGRAM(s) Nebulizer every 6 hours  ascorbic acid 500 milliGRAM(s) Oral two times a day  cyclobenzaprine 10 milliGRAM(s) Oral every 8 hours  dexamethasone  Injectable 4 milliGRAM(s) IV Push every 8 hours  diVALproex Sprinkle 1000 milliGRAM(s) Oral two times a day  docusate sodium 100 milliGRAM(s) Oral three times a day  folic acid 1 milliGRAM(s) Oral daily  lactated ringers. 1000 milliLiter(s) (75 mL/Hr) IV Continuous <Continuous>  levETIRAcetam 1000 milliGRAM(s) Oral two times a day  multivitamin 1 Tablet(s) Oral daily  pantoprazole    Tablet 40 milliGRAM(s) Oral before breakfast  pantoprazole  Injectable 40 milliGRAM(s) IV Push daily  piperacillin/tazobactam IVPB. 3.375 Gram(s) IV Intermittent every 8 hours  senna 2 Tablet(s) Oral at bedtime    MEDICATIONS  (PRN):  acetaminophen   Tablet 650 milliGRAM(s) Oral every 6 hours PRN For Temp greater than 38 C (100.4 F)  acetaminophen   Tablet. 650 milliGRAM(s) Oral every 6 hours PRN headache  ALBUTerol    0.083% 2.5 milliGRAM(s) Nebulizer every 3 hours PRN Shortness of Breath and/or Wheezing  benzocaine 15 mG/menthol 3.6 mG Lozenge 1 Lozenge Oral every 3 hours PRN Sore Throat  diphenhydrAMINE   Capsule 25 milliGRAM(s) Oral at bedtime PRN Insomnia  diphenhydrAMINE   Injectable 12.5 milliGRAM(s) IV Push every 4 hours PRN Itching  guaiFENesin    Syrup 200 milliGRAM(s) Oral every 6 hours PRN Cough  HYDROmorphone  Injectable 1 milliGRAM(s) SubCutaneous every 6 hours PRN Severe Pain (7 - 10)  ondansetron   Disintegrating Tablet 4 milliGRAM(s) Oral every 4 hours PRN Nausea  oxyCODONE    IR 5 milliGRAM(s) Oral every 4 hours PRN Mild Pain (1 - 3)  oxyCODONE    IR 10 milliGRAM(s) Oral every 4 hours PRN Moderate Pain (4 - 6)    Assessment/Plan  #Cervical radiculopathy s/p ACDF  Spine f/u appreciated  Pain meds prn  PT as tolerated  Hemovac removed  Leukocytosis likely reactive postop  Monitor HH    #Hypoxia likely 2 to aspiration pneumonia  CT chest reviewed  Start on Zosyn- ID f/u appreciated  Swallow eval appreciated- diet modified as recommended  Aspirating likely due to postop reactive swelling of the neck tissue  Cont IV steroids over the weekend  Nebs prn  Pulm eval DR Dewey appreciated  Will transition to PO meds by tomorrow if continues to improve    #Seizure disorder  Cont Depakote and Keppra    #Dispo- IV abx and IV steroids over the weekend. Ambulate. Likely home tomorrow on PO abx and tapering steroids

## 2018-08-12 NOTE — PROGRESS NOTE ADULT - SUBJECTIVE AND OBJECTIVE BOX
Subjective:    pat better, ambulating, no new respiratory distress.     Home Medications:  divalproex sodium 500 mg oral delayed release tablet: 2 tab(s) orally 2 times a day (08 Aug 2018 12:08)  divalproex sodium 500 mg oral delayed release tablet: 500 milligram(s) 2 tabs orally once a day (23 Jul 2018 19:22)  Keppra 500 mg oral tablet: 2 tab(s) orally 2 times a day (08 Aug 2018 12:08)  Keppra 750 mg oral tablet: 2 tab(s) orally 2 times a day (23 Jul 2018 19:22)    MEDICATIONS  (STANDING):  ALBUTerol    0.083% 2.5 milliGRAM(s) Nebulizer every 6 hours  ascorbic acid 500 milliGRAM(s) Oral two times a day  cyclobenzaprine 10 milliGRAM(s) Oral every 8 hours  dexamethasone  Injectable 4 milliGRAM(s) IV Push every 8 hours  diVALproex Sprinkle 1000 milliGRAM(s) Oral two times a day  docusate sodium 100 milliGRAM(s) Oral three times a day  folic acid 1 milliGRAM(s) Oral daily  lactated ringers. 1000 milliLiter(s) (75 mL/Hr) IV Continuous <Continuous>  levETIRAcetam 1000 milliGRAM(s) Oral two times a day  multivitamin 1 Tablet(s) Oral daily  pantoprazole    Tablet 40 milliGRAM(s) Oral before breakfast  pantoprazole  Injectable 40 milliGRAM(s) IV Push daily  piperacillin/tazobactam IVPB. 3.375 Gram(s) IV Intermittent every 8 hours  senna 2 Tablet(s) Oral at bedtime    MEDICATIONS  (PRN):  acetaminophen   Tablet 650 milliGRAM(s) Oral every 6 hours PRN For Temp greater than 38 C (100.4 F)  acetaminophen   Tablet. 650 milliGRAM(s) Oral every 6 hours PRN headache  ALBUTerol    0.083% 2.5 milliGRAM(s) Nebulizer every 3 hours PRN Shortness of Breath and/or Wheezing  benzocaine 15 mG/menthol 3.6 mG Lozenge 1 Lozenge Oral every 3 hours PRN Sore Throat  diphenhydrAMINE   Capsule 25 milliGRAM(s) Oral at bedtime PRN Insomnia  diphenhydrAMINE   Injectable 12.5 milliGRAM(s) IV Push every 4 hours PRN Itching  guaiFENesin    Syrup 200 milliGRAM(s) Oral every 6 hours PRN Cough  HYDROmorphone  Injectable 1 milliGRAM(s) SubCutaneous every 6 hours PRN Severe Pain (7 - 10)  ondansetron   Disintegrating Tablet 4 milliGRAM(s) Oral every 4 hours PRN Nausea  oxyCODONE    IR 5 milliGRAM(s) Oral every 4 hours PRN Mild Pain (1 - 3)  oxyCODONE    IR 10 milliGRAM(s) Oral every 4 hours PRN Moderate Pain (4 - 6)      Allergies    No Known Allergies    Intolerances        Vital Signs Last 24 Hrs  T(C): 36.7 (12 Aug 2018 11:00), Max: 36.7 (12 Aug 2018 11:00)  T(F): 98 (12 Aug 2018 11:00), Max: 98 (12 Aug 2018 11:00)  HR: 75 (12 Aug 2018 11:00) (67 - 84)  BP: 135/86 (12 Aug 2018 11:00) (114/87 - 135/86)  BP(mean): 95 (12 Aug 2018 11:00) (95 - 95)  RR: 15 (12 Aug 2018 11:00) (15 - 17)  SpO2: 99% (12 Aug 2018 11:00) (96% - 99%)      PHYSICAL EXAMINATION:    NECK:  Supple. No lymphadenopathy. Jugular venous pressure not elevated. Carotids equal.   HEART:   The cardiac impulse has a normal quality. Reg., Nl S1 and S2.  There are no murmurs, rubs or gallops noted  CHEST:  Chest crackles @ bases to auscultation. Normal respiratory effort.  ABDOMEN:  Soft and nontender.   EXTREMITIES:  There is no edema.       LABS:                        13.7   12.87 )-----------( 267      ( 12 Aug 2018 05:21 )             41.0     08-12    141  |  104  |  18  ----------------------------<  108<H>  4.4   |  30  |  0.78    Ca    9.2      12 Aug 2018 05:21

## 2018-08-13 VITALS
OXYGEN SATURATION: 95 % | SYSTOLIC BLOOD PRESSURE: 143 MMHG | DIASTOLIC BLOOD PRESSURE: 90 MMHG | TEMPERATURE: 98 F | HEART RATE: 84 BPM | RESPIRATION RATE: 16 BRPM

## 2018-08-13 LAB
ANION GAP SERPL CALC-SCNC: 5 MMOL/L — SIGNIFICANT CHANGE UP (ref 5–17)
BUN SERPL-MCNC: 21 MG/DL — SIGNIFICANT CHANGE UP (ref 7–23)
CALCIUM SERPL-MCNC: 9 MG/DL — SIGNIFICANT CHANGE UP (ref 8.5–10.1)
CHLORIDE SERPL-SCNC: 103 MMOL/L — SIGNIFICANT CHANGE UP (ref 96–108)
CO2 SERPL-SCNC: 31 MMOL/L — SIGNIFICANT CHANGE UP (ref 22–31)
CREAT SERPL-MCNC: 0.89 MG/DL — SIGNIFICANT CHANGE UP (ref 0.5–1.3)
GLUCOSE SERPL-MCNC: 87 MG/DL — SIGNIFICANT CHANGE UP (ref 70–99)
HCT VFR BLD CALC: 44.3 % — SIGNIFICANT CHANGE UP (ref 39–50)
HGB BLD-MCNC: 14.7 G/DL — SIGNIFICANT CHANGE UP (ref 13–17)
MCHC RBC-ENTMCNC: 31.1 PG — SIGNIFICANT CHANGE UP (ref 27–34)
MCHC RBC-ENTMCNC: 33.2 GM/DL — SIGNIFICANT CHANGE UP (ref 32–36)
MCV RBC AUTO: 93.9 FL — SIGNIFICANT CHANGE UP (ref 80–100)
NRBC # BLD: 0 /100 WBCS — SIGNIFICANT CHANGE UP (ref 0–0)
PLATELET # BLD AUTO: 318 K/UL — SIGNIFICANT CHANGE UP (ref 150–400)
POTASSIUM SERPL-MCNC: 4.3 MMOL/L — SIGNIFICANT CHANGE UP (ref 3.5–5.3)
POTASSIUM SERPL-SCNC: 4.3 MMOL/L — SIGNIFICANT CHANGE UP (ref 3.5–5.3)
RBC # BLD: 4.72 M/UL — SIGNIFICANT CHANGE UP (ref 4.2–5.8)
RBC # FLD: 13.6 % — SIGNIFICANT CHANGE UP (ref 10.3–14.5)
SODIUM SERPL-SCNC: 139 MMOL/L — SIGNIFICANT CHANGE UP (ref 135–145)
SURGICAL PATHOLOGY FINAL REPORT - CH: SIGNIFICANT CHANGE UP
WBC # BLD: 8.88 K/UL — SIGNIFICANT CHANGE UP (ref 3.8–10.5)
WBC # FLD AUTO: 8.88 K/UL — SIGNIFICANT CHANGE UP (ref 3.8–10.5)

## 2018-08-13 RX ORDER — DIVALPROEX SODIUM 500 MG/1
500 TABLET, DELAYED RELEASE ORAL
Qty: 0 | Refills: 0 | COMMUNITY

## 2018-08-13 RX ADMIN — CYCLOBENZAPRINE HYDROCHLORIDE 10 MILLIGRAM(S): 10 TABLET, FILM COATED ORAL at 06:41

## 2018-08-13 RX ADMIN — PIPERACILLIN AND TAZOBACTAM 25 GRAM(S): 4; .5 INJECTION, POWDER, LYOPHILIZED, FOR SOLUTION INTRAVENOUS at 06:41

## 2018-08-13 RX ADMIN — ALBUTEROL 2.5 MILLIGRAM(S): 90 AEROSOL, METERED ORAL at 09:15

## 2018-08-13 RX ADMIN — LEVETIRACETAM 1000 MILLIGRAM(S): 250 TABLET, FILM COATED ORAL at 06:41

## 2018-08-13 RX ADMIN — Medication 4 MILLIGRAM(S): at 06:41

## 2018-08-13 RX ADMIN — DIVALPROEX SODIUM 1000 MILLIGRAM(S): 500 TABLET, DELAYED RELEASE ORAL at 06:42

## 2018-08-13 RX ADMIN — PANTOPRAZOLE SODIUM 40 MILLIGRAM(S): 20 TABLET, DELAYED RELEASE ORAL at 06:41

## 2018-08-13 NOTE — PROGRESS NOTE ADULT - SUBJECTIVE AND OBJECTIVE BOX
Subjective:    pat no new complaint, ambulating.    Home Medications:  divalproex sodium 500 mg oral delayed release tablet: 2 tab(s) orally 2 times a day (08 Aug 2018 12:08)  divalproex sodium 500 mg oral delayed release tablet: 500 milligram(s) 2 tabs orally once a day (23 Jul 2018 19:22)  Keppra 500 mg oral tablet: 2 tab(s) orally 2 times a day (08 Aug 2018 12:08)  Keppra 750 mg oral tablet: 2 tab(s) orally 2 times a day (23 Jul 2018 19:22)    MEDICATIONS  (STANDING):  ALBUTerol    0.083% 2.5 milliGRAM(s) Nebulizer every 6 hours  ascorbic acid 500 milliGRAM(s) Oral two times a day  cyclobenzaprine 10 milliGRAM(s) Oral every 8 hours  diVALproex Sprinkle 1000 milliGRAM(s) Oral two times a day  docusate sodium 100 milliGRAM(s) Oral three times a day  folic acid 1 milliGRAM(s) Oral daily  lactated ringers. 1000 milliLiter(s) (75 mL/Hr) IV Continuous <Continuous>  levETIRAcetam 1000 milliGRAM(s) Oral two times a day  multivitamin 1 Tablet(s) Oral daily  pantoprazole    Tablet 40 milliGRAM(s) Oral before breakfast  pantoprazole  Injectable 40 milliGRAM(s) IV Push daily  piperacillin/tazobactam IVPB. 3.375 Gram(s) IV Intermittent every 8 hours  senna 2 Tablet(s) Oral at bedtime    MEDICATIONS  (PRN):  acetaminophen   Tablet 650 milliGRAM(s) Oral every 6 hours PRN For Temp greater than 38 C (100.4 F)  acetaminophen   Tablet. 650 milliGRAM(s) Oral every 6 hours PRN headache  ALBUTerol    0.083% 2.5 milliGRAM(s) Nebulizer every 3 hours PRN Shortness of Breath and/or Wheezing  benzocaine 15 mG/menthol 3.6 mG Lozenge 1 Lozenge Oral every 3 hours PRN Sore Throat  diphenhydrAMINE   Capsule 25 milliGRAM(s) Oral at bedtime PRN Insomnia  diphenhydrAMINE   Injectable 12.5 milliGRAM(s) IV Push every 4 hours PRN Itching  guaiFENesin    Syrup 200 milliGRAM(s) Oral every 6 hours PRN Cough  HYDROmorphone  Injectable 1 milliGRAM(s) SubCutaneous every 6 hours PRN Severe Pain (7 - 10)  ondansetron   Disintegrating Tablet 4 milliGRAM(s) Oral every 4 hours PRN Nausea  oxyCODONE    IR 5 milliGRAM(s) Oral every 4 hours PRN Mild Pain (1 - 3)  oxyCODONE    IR 10 milliGRAM(s) Oral every 4 hours PRN Moderate Pain (4 - 6)      Allergies    No Known Allergies    Intolerances        Vital Signs Last 24 Hrs  T(C): 36.3 (13 Aug 2018 05:30), Max: 36.7 (12 Aug 2018 11:00)  T(F): 97.4 (13 Aug 2018 05:30), Max: 98.1 (12 Aug 2018 17:20)  HR: 67 (13 Aug 2018 05:30) (67 - 78)  BP: 134/87 (13 Aug 2018 05:30) (127/79 - 135/86)  BP(mean): 95 (12 Aug 2018 11:00) (95 - 95)  RR: 16 (13 Aug 2018 05:30) (15 - 16)  SpO2: 95% (13 Aug 2018 05:30) (95% - 99%)      PHYSICAL EXAMINATION:    NECK:  Supple. No lymphadenopathy. Jugular venous pressure not elevated. Carotids equal.   HEART:   The cardiac impulse has a normal quality. Reg., Nl S1 and S2.  There are no murmurs, rubs or gallops noted  CHEST:  Chest is clear to auscultation. Normal respiratory effort.  ABDOMEN:  Soft and nontender.   EXTREMITIES:  There is no edema.       LABS:                        14.7   8.88  )-----------( 318      ( 13 Aug 2018 09:19 )             44.3     08-13    139  |  103  |  21  ----------------------------<  87  4.3   |  31  |  0.89    Ca    9.0      13 Aug 2018 09:19

## 2018-08-13 NOTE — PROGRESS NOTE ADULT - ASSESSMENT
AP: 47M s/p ACDF C4-C6 POD4  Pain Control  DVT ppx  WBAT  PT  Cepacol PRN  Cont Abx per ID  Incentive Spirometry  Medical management appreciated  RAYMON DC'd 8/11  DC planning  Will d/w attending
46yo male with past medical history seizure disorder, spine surgery in past admitted on 8/8 for evaluation of progressive right arm weakness and neck pain; patient underwent cervical orthopedic surgery on 8/8; in morning of 8/10 patient developed hoarseness and cough with difficulty swallowing; rapid response was called and patient was hypoxic; evaluation revealed new pneumonia, most likely aspiration pneumonia.  1. Patient s/p ortho spine surgery, now with aspiration pneumonia  - follow up cultures   - iv hydration and supportive care   - serial cbc and monitor temperature   - reviewed prior medical records to evaluate for resistant or atypical pathogens   - wound care per surgery  - oxygen and nebs as needed   - agree with zosyn as ordered, day #2  - tolerating antibiotics without rashes or side effects   - incentive spirometry  2. other issues; per medicine
48yo male with past medical history seizure disorder, spine surgery in past admitted on 8/8 for evaluation of progressive right arm weakness and neck pain; patient underwent cervical orthopedic surgery on 8/8; in morning of 8/10 patient developed hoarseness and cough with difficulty swallowing; rapid response was called and patient was hypoxic; evaluation revealed new pneumonia, most likely aspiration pneumonia.  1. Patient s/p ortho spine surgery, now with aspiration pneumonia  - follow up cultures   - iv hydration and supportive care   - serial cbc and monitor temperature   - reviewed prior medical records to evaluate for resistant or atypical pathogens   - wound care per surgery  - oxygen and nebs as needed   - agree with zosyn as ordered, day #3  - okay from id standpoint to discharge home on augmentin 875 mg po q 12 hours for 7 more days  - tolerating antibiotics without rashes or side effects   - incentive spirometry  2. other issues; per medicine
AP: 47? s/p ACDF C4-C6  Pain Control  DVT ppx  WBAT  PT  Incentive Spirometry  Medical management appreciated  FU RAYMON output  DC planning
PROBLEMS:    AC RESPIRATORY Failure with Hypoxia likely 2 to aspiration pneumonia  Basilar atelectasis  Upper airway local edema possible due to cervical surgery  Cervical radiculopathy s/p ACDF  H/O Seizure disorder    PLAN:    pulmonary stabe  AMBULATING AS TOLERATED  IV ZOSYN  INCENTIVE SPIROMETRY  IV DEXAMETASONE  supportive care  dvt prophylasix
AP: 47? s/p ACDF C4-C6 POD2    Pain Control  DVT ppx  WBAT  PT  add cepacol  Incentive Spirometry  Medical management appreciated  FU RAYMON output  DC planning likely home today  will discuss with attending in regards to dysphagia likely 2/2 surgical manipulation
AP: 47M s/p ACDF C4-C6 POD5  Pain Control  DVT ppx  WBAT  PT  Cepacol PRN  Cont Abx per ID  Incentive Spirometry  Medical management appreciated  DC home 8/13 w/ PO Abx
PROBLEMS:    AC RESPIRATORY Failure with Hypoxia likely 2 to aspiration pneumonia  Basilar atelectasis  Upper airway local edema possible due to cervical surgery  Cervical radiculopathy s/p ACDF  H/O Seizure disorder    PLAN:    AMBULATING AS TOLERATED  IV ZOSYN  INCENTIVE SPIROMETRY  IV DEXAMETASONE  supportive care  dvt prophylasix

## 2018-08-13 NOTE — PROGRESS NOTE ADULT - PROVIDER SPECIALTY LIST ADULT
Hospitalist
Orthopedics
Pulmonology
Orthopedics
Hospitalist
Infectious Disease
Infectious Disease
Orthopedics
Pulmonology

## 2018-08-13 NOTE — PROGRESS NOTE ADULT - SUBJECTIVE AND OBJECTIVE BOX
Pt seen and examined at bedside. Pain controlled. denies numbness or tingling. Pt c/o mild pain during swallowing. Denies cp/sob at present.    Vital Signs Last 24 Hrs  T(C): 36.3 (13 Aug 2018 05:30), Max: 36.7 (12 Aug 2018 11:00)  T(F): 97.4 (13 Aug 2018 05:30), Max: 98.1 (12 Aug 2018 17:20)  HR: 67 (13 Aug 2018 05:30) (67 - 78)  BP: 134/87 (13 Aug 2018 05:30) (127/79 - 135/86)  BP(mean): 95 (12 Aug 2018 11:00) (95 - 95)  RR: 16 (13 Aug 2018 05:30) (15 - 16)  SpO2: 95% (13 Aug 2018 05:30) (95% - 99%)    Gen: NAD, resting comfortably    Spine PE:  Dressing C/D/I  No upper motor neuron signs, Neg Abernathy/babinski    Motor:                   C5                C6              C7               C8           T1   R            5/5                5/5            5/5             5/5          5/5  L             5/5               5/5             5/5             5/5          5/5                L2             L3             L4               L5            S1  R         5/5           5/5          5/5             5/5           5/5  L          5/5          5/5           5/5             5/5           5/5    Sensory:            C5         C6         C7      C8       T1        (0=absent, 1=impaired, 2=normal, NT=not testable)  R         2            2           2        2         2  L          2            2           2        2         2               L2          L3         L4      L5       S1         (0=absent, 1=impaired, 2=normal, NT=not testable)  R         2            2            2        2        2  L          2            2           2        2         2

## 2018-08-13 NOTE — PROGRESS NOTE ADULT - SUBJECTIVE AND OBJECTIVE BOX
CC- neck pain with numbness to RUE    HPI:  46yo/M with PMH seizure disorder presented with neck pain associated with numbness and tingling to RUE. Patient was seen by spine DR Parker and will need to go to OR later on today. Medical consult called for medical clearance. Patient does not have known cardiac history, he denies chest pain and dyspnea on exertion or rest.     PMH- as above  PSH- spine surgery  Soc hx- denies smoking, alcohol-socially  Fam hx- non-contributory    8/9/18- s/p ACDF, doing good  8/10/18- s/p RR earlier today. Patient developed hypoxia. CT chest- multifocal pneumonia  8/11/18 coughing when eating  8/12/18 wheezing but improving  8/13/18 feels better    Review of system- All 10 systems reviewed and is as per HPI otherwise negative.     Vital Signs Last 24 Hrs  T(C): 36.8 (13 Aug 2018 10:45), Max: 36.8 (13 Aug 2018 10:45)  T(F): 98.2 (13 Aug 2018 10:45), Max: 98.2 (13 Aug 2018 10:45)  HR: 84 (13 Aug 2018 10:45) (67 - 84)  BP: 143/90 (13 Aug 2018 10:45) (127/79 - 143/90)  BP(mean): --  RR: 16 (13 Aug 2018 10:45) (16 - 16)  SpO2: 95% (13 Aug 2018 10:45) (95% - 97%)    LABS:                                                14.7   8.88  )-----------( 318      ( 13 Aug 2018 09:19 )             44.3     13 Aug 2018 09:19    139    |  103    |  21     ----------------------------<  87     4.3     |  31     |  0.89     Ca    9.0        13 Aug 2018 09:19    RADIOLOGY & ADDITIONAL TESTS:  EXAM:  XR CHEST AP OR PA 1V                        PROCEDURE DATE:  08/08/2018    INTERPRETATION:  Exam Date: 8/8/2018 9:34 AM    History: Preoperative evaluation    Technique: Single frontal portable view of the chest with no prior   studies available for comparison    Findings:    The heart is mildly enlarged versus artifact from AP projection.  The   lungs are grossly clear. The apices and hemidiaphragms are unremarkable.   Degenerative changes of the visualized osseous structures.    Impression:  No acute disease    PHYSICAL EXAM:  GENERAL: NAD, well-groomed, well-developed  HEAD:  Atraumatic, Normocephalic  EYES: EOMI, PERRLA, conjunctiva and sclera clear  HEENT: Moist mucous membranes  NECK: Supple, No JVD, dressing dry, hemovac removed  NERVOUS SYSTEM:  Alert & Oriented X3, Motor Strength 5/5 B/L upper and lower extremities; DTRs 2+ intact and symmetric  CHEST/LUNG: occasional rhonchi and wheezing  HEART: Regular rate and rhythm; No murmurs, rubs, or gallops  ABDOMEN: Soft, Nontender, Nondistended; Bowel sounds present  GENITOURINARY- Voiding, no palpable bladder  EXTREMITIES:  2+ Peripheral Pulses, No clubbing, cyanosis, or edema  MUSCULOSKELTAL- No muscle tenderness, Muscle tone normal, No joint tenderness, no Joint swelling, Joint range of motion-normal  SKIN-no rash, no lesion  CNS- alert, oriented X3, non focal     Daily Height in cm: 167.64 (08 Aug 2018 07:23)      MEDICATIONS  (STANDING):  ALBUTerol    0.083% 2.5 milliGRAM(s) Nebulizer every 6 hours  ascorbic acid 500 milliGRAM(s) Oral two times a day  cyclobenzaprine 10 milliGRAM(s) Oral every 8 hours  dexamethasone  Injectable 4 milliGRAM(s) IV Push every 8 hours  diVALproex Sprinkle 1000 milliGRAM(s) Oral two times a day  docusate sodium 100 milliGRAM(s) Oral three times a day  folic acid 1 milliGRAM(s) Oral daily  lactated ringers. 1000 milliLiter(s) (75 mL/Hr) IV Continuous <Continuous>  levETIRAcetam 1000 milliGRAM(s) Oral two times a day  multivitamin 1 Tablet(s) Oral daily  pantoprazole    Tablet 40 milliGRAM(s) Oral before breakfast  pantoprazole  Injectable 40 milliGRAM(s) IV Push daily  piperacillin/tazobactam IVPB. 3.375 Gram(s) IV Intermittent every 8 hours  senna 2 Tablet(s) Oral at bedtime    MEDICATIONS  (PRN):  acetaminophen   Tablet 650 milliGRAM(s) Oral every 6 hours PRN For Temp greater than 38 C (100.4 F)  acetaminophen   Tablet. 650 milliGRAM(s) Oral every 6 hours PRN headache  ALBUTerol    0.083% 2.5 milliGRAM(s) Nebulizer every 3 hours PRN Shortness of Breath and/or Wheezing  benzocaine 15 mG/menthol 3.6 mG Lozenge 1 Lozenge Oral every 3 hours PRN Sore Throat  diphenhydrAMINE   Capsule 25 milliGRAM(s) Oral at bedtime PRN Insomnia  diphenhydrAMINE   Injectable 12.5 milliGRAM(s) IV Push every 4 hours PRN Itching  guaiFENesin    Syrup 200 milliGRAM(s) Oral every 6 hours PRN Cough  HYDROmorphone  Injectable 1 milliGRAM(s) SubCutaneous every 6 hours PRN Severe Pain (7 - 10)  ondansetron   Disintegrating Tablet 4 milliGRAM(s) Oral every 4 hours PRN Nausea  oxyCODONE    IR 5 milliGRAM(s) Oral every 4 hours PRN Mild Pain (1 - 3)  oxyCODONE    IR 10 milliGRAM(s) Oral every 4 hours PRN Moderate Pain (4 - 6)    Assessment/Plan  #Cervical radiculopathy s/p ACDF  Spine f/u appreciated  Pain meds prn  PT as tolerated  Hemovac removed  Leukocytosis likely reactive postop  Monitor HH    #Hypoxia likely 2 to aspiration pneumonia  CT chest reviewed  D/w DR Reyes- OK to switch to PO AUgmentin 875mg for 7 more days  Swallow eval appreciated- diet modified as recommended  Aspirating likely due to postop reactive swelling of the neck tissue  S?p IV steroids, OK to DC  Nebs prn  Pulm eval DR Dewey appreciated    #Seizure disorder  Cont Depakote and Keppra    #Dispo- medically stable for discharge on PO antibiotics, off steroids. Outpatient f/u with DR Parker

## 2018-08-19 DIAGNOSIS — J69.0 PNEUMONITIS DUE TO INHALATION OF FOOD AND VOMIT: ICD-10-CM

## 2018-08-19 DIAGNOSIS — M50.022 CERVICAL DISC DISORDER AT C5-C6 LEVEL WITH MYELOPATHY: ICD-10-CM
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
## 2018-08-19 DIAGNOSIS — J95.89 OTHER POSTPROCEDURAL COMPLICATIONS AND DISORDERS OF RESPIRATORY SYSTEM, NOT ELSEWHERE CLASSIFIED: ICD-10-CM

## 2018-08-19 DIAGNOSIS — M54.2 CERVICALGIA: ICD-10-CM

## 2018-08-19 DIAGNOSIS — M50.121 CERVICAL DISC DISORDER AT C4-C5 LEVEL WITH RADICULOPATHY: ICD-10-CM

## 2018-08-19 DIAGNOSIS — M50.122 CERVICAL DISC DISORDER AT C5-C6 LEVEL WITH RADICULOPATHY: ICD-10-CM

## 2018-08-19 DIAGNOSIS — S13.151A DISLOCATION OF C4/C5 CERVICAL VERTEBRAE, INITIAL ENCOUNTER: ICD-10-CM
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
## 2018-08-19 DIAGNOSIS — J95.821 ACUTE POSTPROCEDURAL RESPIRATORY FAILURE: ICD-10-CM

## 2018-08-19 DIAGNOSIS — M50.021 CERVICAL DISC DISORDER AT C4-C5 LEVEL WITH MYELOPATHY: ICD-10-CM

## 2018-08-19 DIAGNOSIS — G89.29 OTHER CHRONIC PAIN: ICD-10-CM

## 2018-08-19 DIAGNOSIS — M54.9 DORSALGIA, UNSPECIFIED: ICD-10-CM

## 2018-08-19 DIAGNOSIS — J98.11 ATELECTASIS: ICD-10-CM

## 2018-08-19 DIAGNOSIS — S13.161A DISLOCATION OF C5/C6 CERVICAL VERTEBRAE, INITIAL ENCOUNTER: ICD-10-CM
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
## 2018-08-19 DIAGNOSIS — Z79.891 LONG TERM (CURRENT) USE OF OPIATE ANALGESIC: ICD-10-CM

## 2018-08-19 DIAGNOSIS — D72.829 ELEVATED WHITE BLOOD CELL COUNT, UNSPECIFIED: ICD-10-CM

## 2018-08-19 DIAGNOSIS — R09.02 HYPOXEMIA: ICD-10-CM

## 2018-08-19 DIAGNOSIS — G40.909 EPILEPSY, UNSPECIFIED, NOT INTRACTABLE, WITHOUT STATUS EPILEPTICUS: ICD-10-CM

## 2018-09-18 ENCOUNTER — EMERGENCY (EMERGENCY)
Facility: HOSPITAL | Age: 48
LOS: 1 days | Discharge: HOME | End: 2018-09-18
Attending: EMERGENCY MEDICINE
Payer: COMMERCIAL

## 2018-09-18 VITALS — HEIGHT: 64 IN | WEIGHT: 199.96 LBS

## 2018-09-18 VITALS
OXYGEN SATURATION: 97 % | HEART RATE: 72 BPM | TEMPERATURE: 99 F | RESPIRATION RATE: 18 BRPM | DIASTOLIC BLOOD PRESSURE: 98 MMHG | SYSTOLIC BLOOD PRESSURE: 152 MMHG

## 2018-09-18 PROBLEM — M54.9 DORSALGIA, UNSPECIFIED: Chronic | Status: ACTIVE | Noted: 2018-08-08

## 2018-09-18 PROCEDURE — T1013: CPT

## 2018-09-18 PROCEDURE — 99283 EMERGENCY DEPT VISIT LOW MDM: CPT

## 2018-09-18 RX ORDER — IBUPROFEN 200 MG
1 TABLET ORAL
Qty: 15 | Refills: 0
Start: 2018-09-18 | End: 2018-09-22

## 2018-09-18 NOTE — ED STATDOCS - MEDICAL DECISION MAKING DETAILS
Pain most likely due to improper cane usage.  Reeducated on proper usage.  Ibuprogen for pain, f/u Dr. Parker.  Declined offer of ibuprofen, states that he will take at home.

## 2018-09-18 NOTE — ED STATDOCS - OBJECTIVE STATEMENT
48 y/o M s/p ACDF with DR. Parker last month presents with c/o right shoulder pain x 3 days.  He has been using a cane on that side.  Denies trauma, has not taken anything for pain.  Has appointment with Dr. Parker on friday.

## 2018-09-18 NOTE — ED STATDOCS - ATTENDING CONTRIBUTION TO CARE
48 y/o M s/p ACDF with DR. Parker last month presents with c/o right shoulder pain x 3 days.  He has been using a cane on that side.  Denies trauma, has not taken anything for pain.  pe rt shoulder pain from; tender diffusely; dx shoulder pain

## 2018-10-17 ENCOUNTER — APPOINTMENT (OUTPATIENT)
Dept: NEUROLOGY | Facility: CLINIC | Age: 48
End: 2018-10-17
Payer: MEDICAID

## 2018-10-17 VITALS
DIASTOLIC BLOOD PRESSURE: 75 MMHG | BODY MASS INDEX: 37.21 KG/M2 | SYSTOLIC BLOOD PRESSURE: 130 MMHG | WEIGHT: 210 LBS | HEIGHT: 63 IN

## 2018-10-17 PROCEDURE — 99214 OFFICE O/P EST MOD 30 MIN: CPT

## 2018-10-17 NOTE — ASSESSMENT
[FreeTextEntry1] : This is a 47-year-old man with epilepsy.\par I will continue his current dosages of Keppra and Depakote.\par \par Headaches are not bothering him significantly at present.\par \par He also has lumbar spine disease and is status post surgery. \par He will follow with his orthopedist.\par \par I will see him back in 6 months.

## 2018-10-17 NOTE — CONSULT LETTER
[Dear  ___] : Dear  [unfilled], [Courtesy Letter:] : I had the pleasure of seeing your patient, [unfilled], in my office today. [Please see my note below.] : Please see my note below. [Consult Closing:] : Thank you very much for allowing me to participate in the care of this patient.  If you have any questions, please do not hesitate to contact me. [Sincerely,] : Sincerely, [FreeTextEntry3] : Jeet Howard MD.

## 2018-10-17 NOTE — PHYSICAL EXAM
[General Appearance - Alert] : alert [Oriented To Time, Place, And Person] : oriented to person, place, and time [Memory Recent] : recent memory was not impaired [Memory Remote] : remote memory was not impaired [Cranial Nerves Optic (II)] : visual acuity intact bilaterally,  visual fields full to confrontation, pupils equal round and reactive to light [Cranial Nerves Oculomotor (III)] : extraocular motion intact [Cranial Nerves Trigeminal (V)] : facial sensation intact symmetrically [Cranial Nerves Facial (VII)] : face symmetrical [Cranial Nerves Vestibulocochlear (VIII)] : hearing was intact bilaterally [Cranial Nerves Glossopharyngeal (IX)] : tongue and palate midline [Cranial Nerves Accessory (XI - Cranial And Spinal)] : head turning and shoulder shrug symmetric [Cranial Nerves Hypoglossal (XII)] : there was no tongue deviation with protrusion [Motor Tone] : muscle tone was normal in all four extremities [Motor Strength] : muscle strength was normal in all four extremities [Sensation Tactile Decrease] : light touch was intact [Sensation Pain / Temperature Decrease] : pain and temperature was intact [Sensation Vibration Decrease] : vibration was intact [1+] : Patella left 1+ [Arterial Pulses Carotid] : carotid pulses were normal with no bruits [Aphasia] : no dysphasia/aphasia [Romberg's Sign] : Romberg's sign was negtive [Coordination - Dysmetria Impaired Finger-to-Nose Bilateral] : not present [Plantar Reflex Right Only] : normal on the right [Plantar Reflex Left Only] : normal on the left [FreeTextEntry8] : Ambulation required a cane.
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
## 2018-10-17 NOTE — DATA REVIEWED
[de-identified] : Head CT from 6/1/15 demonstrated a stable left middle cranial fossa arachnoid cyst.

## 2018-11-08 ENCOUNTER — EMERGENCY (EMERGENCY)
Facility: HOSPITAL | Age: 48
LOS: 1 days | Discharge: HOME | End: 2018-11-08
Attending: EMERGENCY MEDICINE
Payer: COMMERCIAL

## 2018-11-08 VITALS — HEIGHT: 67 IN | WEIGHT: 250 LBS

## 2018-11-08 PROCEDURE — 99284 EMERGENCY DEPT VISIT MOD MDM: CPT

## 2018-11-08 RX ORDER — SODIUM CHLORIDE 9 MG/ML
3 INJECTION INTRAMUSCULAR; INTRAVENOUS; SUBCUTANEOUS ONCE
Qty: 0 | Refills: 0 | Status: COMPLETED | OUTPATIENT
Start: 2018-11-08 | End: 2018-11-08

## 2018-11-08 RX ORDER — MORPHINE SULFATE 50 MG/1
4 CAPSULE, EXTENDED RELEASE ORAL ONCE
Qty: 0 | Refills: 0 | Status: DISCONTINUED | OUTPATIENT
Start: 2018-11-08 | End: 2018-11-08

## 2018-11-08 RX ORDER — OXYCODONE AND ACETAMINOPHEN 5; 325 MG/1; MG/1
1 TABLET ORAL ONCE
Qty: 0 | Refills: 0 | Status: DISCONTINUED | OUTPATIENT
Start: 2018-11-08 | End: 2018-11-08

## 2018-11-08 RX ORDER — METHOCARBAMOL 500 MG/1
1500 TABLET, FILM COATED ORAL ONCE
Qty: 0 | Refills: 0 | Status: DISCONTINUED | OUTPATIENT
Start: 2018-11-08 | End: 2018-11-08

## 2018-11-08 RX ORDER — METHOCARBAMOL 500 MG/1
1000 TABLET, FILM COATED ORAL ONCE
Qty: 0 | Refills: 0 | Status: COMPLETED | OUTPATIENT
Start: 2018-11-08 | End: 2018-11-08

## 2018-11-08 RX ORDER — DEXAMETHASONE 0.5 MG/5ML
10 ELIXIR ORAL ONCE
Qty: 0 | Refills: 0 | Status: COMPLETED | OUTPATIENT
Start: 2018-11-08 | End: 2018-11-08

## 2018-11-08 RX ORDER — DIAZEPAM 5 MG
5 TABLET ORAL ONCE
Qty: 0 | Refills: 0 | Status: DISCONTINUED | OUTPATIENT
Start: 2018-11-08 | End: 2018-11-08

## 2018-11-08 RX ORDER — KETOROLAC TROMETHAMINE 30 MG/ML
30 SYRINGE (ML) INJECTION ONCE
Qty: 0 | Refills: 0 | Status: DISCONTINUED | OUTPATIENT
Start: 2018-11-08 | End: 2018-11-08

## 2018-11-08 RX ADMIN — MORPHINE SULFATE 4 MILLIGRAM(S): 50 CAPSULE, EXTENDED RELEASE ORAL at 23:01

## 2018-11-08 RX ADMIN — OXYCODONE AND ACETAMINOPHEN 1 TABLET(S): 5; 325 TABLET ORAL at 22:18

## 2018-11-08 RX ADMIN — Medication 102 MILLIGRAM(S): at 20:18

## 2018-11-08 RX ADMIN — Medication 5 MILLIGRAM(S): at 22:18

## 2018-11-08 RX ADMIN — SODIUM CHLORIDE 3 MILLILITER(S): 9 INJECTION INTRAMUSCULAR; INTRAVENOUS; SUBCUTANEOUS at 22:19

## 2018-11-08 RX ADMIN — Medication 30 MILLIGRAM(S): at 20:18

## 2018-11-08 NOTE — ED STATDOCS - PROGRESS NOTE DETAILS
Pt seen by intake MD and agree with HPI/ROS/PE/Plan. PT is ambulating. Will medicate at home and have f/u with surgeon.

## 2018-11-08 NOTE — ED STATDOCS - MEDICAL DECISION MAKING DETAILS
medicate for pain, Rx Toradol, valium, Robaxin, percocet, observe and reassess, consider MRI given pt's hx of c and l spine surgery.

## 2018-11-08 NOTE — ED ADULT TRIAGE NOTE - CHIEF COMPLAINT QUOTE
Patient arrived to ED today with c/o back pains, no recent injury. Patient arrived to ED today with c/o back pains radiating to his legs, no recent injury.  Patient recently had back surgery for herniated disc and neck surgery.

## 2018-11-08 NOTE — ED STATDOCS - OBJECTIVE STATEMENT
48 y/o M pt with PMHx HLD, s/p spinal disc removal surgery (6 discs, Feb 26th 2018), s/p neck disc removal surgery (6 discs, August 2018) presents to the ED c/o sudden onset back and neck pain one hour ago.  Pt was at home laying down when he suddenly felt severe pain to his neck and back.  He also notes numbness in his b/l legs.  Denies fever, chills, N/V, urinary symptoms, weakness, CP, SOB.  No further acute complaints at this time.  Neurologist: Dr. Victor.  Orthopedic Surgeon: Dr. Parker.  PMD: Dr. Gareth Esposito.

## 2018-11-08 NOTE — ED STATDOCS - NS_ ATTENDINGSCRIBEDETAILS _ED_A_ED_FT
I, Ned Abdi, performed the initial face to face bedside interview with this patient regarding history of present illness, review of symptoms and relevant past medical, social and family history.  I completed an independent physical examination.  I was the initial provider who evaluated this patient. I have signed out the follow up of any pending tests (i.e. labs, radiological studies) to the ACP.  I have communicated the patient’s plan of care and disposition with the ACP.  The history, relevant review of systems, past medical and surgical history, medical decision making, and physical examination was documented by the scribe in my presence and I attest to the accuracy of the documentation.

## 2018-11-09 VITALS
DIASTOLIC BLOOD PRESSURE: 74 MMHG | RESPIRATION RATE: 19 BRPM | TEMPERATURE: 98 F | HEART RATE: 70 BPM | OXYGEN SATURATION: 100 % | SYSTOLIC BLOOD PRESSURE: 130 MMHG

## 2018-11-09 PROCEDURE — 99284 EMERGENCY DEPT VISIT MOD MDM: CPT | Mod: 25

## 2018-11-09 PROCEDURE — T1013: CPT

## 2018-11-09 PROCEDURE — 96374 THER/PROPH/DIAG INJ IV PUSH: CPT

## 2018-11-09 PROCEDURE — 72141 MRI NECK SPINE W/O DYE: CPT

## 2018-11-09 PROCEDURE — 96375 TX/PRO/DX INJ NEW DRUG ADDON: CPT

## 2018-11-09 PROCEDURE — 72148 MRI LUMBAR SPINE W/O DYE: CPT

## 2018-11-09 PROCEDURE — 72141 MRI NECK SPINE W/O DYE: CPT | Mod: 26

## 2018-11-09 PROCEDURE — 72148 MRI LUMBAR SPINE W/O DYE: CPT | Mod: 26

## 2018-11-09 RX ORDER — METHOCARBAMOL 500 MG/1
2 TABLET, FILM COATED ORAL
Qty: 60 | Refills: 0
Start: 2018-11-09 | End: 2018-11-18

## 2018-11-09 RX ORDER — OXYCODONE AND ACETAMINOPHEN 5; 325 MG/1; MG/1
1 TABLET ORAL ONCE
Qty: 0 | Refills: 0 | Status: DISCONTINUED | OUTPATIENT
Start: 2018-11-09 | End: 2018-11-09

## 2018-11-09 RX ADMIN — OXYCODONE AND ACETAMINOPHEN 1 TABLET(S): 5; 325 TABLET ORAL at 03:06

## 2018-11-09 RX ADMIN — OXYCODONE AND ACETAMINOPHEN 1 TABLET(S): 5; 325 TABLET ORAL at 03:36

## 2018-11-09 NOTE — ED ADULT NURSE NOTE - CHIEF COMPLAINT QUOTE
Patient arrived to ED today with c/o back pains radiating to his legs, no recent injury.  Patient recently had back surgery for herniated disc and neck surgery.

## 2018-11-09 NOTE — ED ADULT NURSE NOTE - NSIMPLEMENTINTERV_GEN_ALL_ED
Implemented All Universal Safety Interventions:  [place] to call system. Call bell, personal items and telephone within reach. Instruct patient to call for assistance. Room bathroom lighting operational. Non-slip footwear when patient is off stretcher. Physically safe environment: no spills, clutter or unnecessary equipment. Stretcher in lowest position, wheels locked, appropriate side rails in place.

## 2018-11-22 NOTE — PATIENT PROFILE ADULT. - COMFORT LEVEL, ACCEPTABLE
Discussion/Summary   MS DUGAN , YOU HAVE MILD ANEMIA , STABLE .  THE REST IS NORMAL .        Verified Results  CBC WITH AUTOMATED DIFFERENTIAL 02Oct2017 04:20PM DICKINSON, KARISSA     Test Name Result Flag Reference   WHITE BLOOD COUNT 7.1 K/mcL  4.2-11.0   RED CELL COUNT 4.64 mil/mcL  4.00-5.20   HEMOGLOBIN 11.5 g/dl L 12.0-15.5   HEMATOCRIT 37.5 %  36.0-46.5   MEAN CORPUSCULAR VOLUME 80.8 fL  78.0-100.0   MEAN CORPUSCULAR HEMOGLOBIN 24.8 pg L 26.0-34.0   MEAN CORPUSCULAR HGB CONC 30.7 g/dl L 32.0-36.5   RDW-CV 18.8 % H 11.0-15.0   PLATELET COUNT 195 K/mcL  140-450   DIFF TYPE      AUTOMATED DIFFERENTIAL   CON% 55 %     LYM% 37 %     MON% 7 %     EOS% 1 %     BASO% 0 %     CON ABS 3.9 K/mcL  1.8-7.7   LYM ABS 2.6 K/mcL  1.0-4.0   MON ABS 0.5 K/mcL  0.3-0.9   EOS ABS 0.1 K/mcL  0.1-0.5   BASO ABS 0.0 K/mcL  0.0-0.3     IRON AND TIBC 02Oct2017 04:20PM DICKINSON, KARISSA     Test Name Result Flag Reference   IRON 85 mcg/dl     IRON BINDING CAPACITY 307 mcg/dl  250-450   % IRON SATURATION 28 %  15-45     FERRITIN 02Oct2017 04:20PM DICKINSON, KARISSA     Test Name Result Flag Reference   FERRITIN 15 ng/ml  8-252       
4

## 2019-03-01 ENCOUNTER — EMERGENCY (EMERGENCY)
Facility: HOSPITAL | Age: 49
LOS: 1 days | Discharge: HOME | End: 2019-03-01
Attending: EMERGENCY MEDICINE
Payer: COMMERCIAL

## 2019-03-01 VITALS
DIASTOLIC BLOOD PRESSURE: 112 MMHG | HEIGHT: 66 IN | HEART RATE: 69 BPM | WEIGHT: 199.96 LBS | SYSTOLIC BLOOD PRESSURE: 181 MMHG | OXYGEN SATURATION: 97 % | RESPIRATION RATE: 24 BRPM | TEMPERATURE: 98 F

## 2019-03-01 PROCEDURE — 99283 EMERGENCY DEPT VISIT LOW MDM: CPT

## 2019-03-01 RX ORDER — PENICILLIN V POTASSIUM 250 MG
500 TABLET ORAL ONCE
Qty: 0 | Refills: 0 | Status: DISCONTINUED | OUTPATIENT
Start: 2019-03-01 | End: 2019-03-06

## 2019-03-01 RX ORDER — IBUPROFEN 200 MG
800 TABLET ORAL ONCE
Qty: 0 | Refills: 0 | Status: COMPLETED | OUTPATIENT
Start: 2019-03-01 | End: 2019-03-01

## 2019-03-01 RX ORDER — PENICILLIN V POTASSIUM 250 MG
1 TABLET ORAL
Qty: 28 | Refills: 0
Start: 2019-03-01 | End: 2019-03-07

## 2019-03-01 RX ORDER — IBUPROFEN 200 MG
1 TABLET ORAL
Qty: 20 | Refills: 0
Start: 2019-03-01 | End: 2019-03-05

## 2019-03-01 RX ADMIN — Medication 800 MILLIGRAM(S): at 21:00

## 2019-03-01 NOTE — ED STATDOCS - OBJECTIVE STATEMENT
49 y/o M with PMHx of seizures, depression and chronic back pain presents to ED c/o toothache (R lower tooth) onset today. Follows up with a dentist outpatient regularly, and last visit was over 2 months ago, everything was normal. NKDA. No further acute complaints at this time. 49 y/o M with PMHx of seizures, depression and chronic back pain presents to ED c/o toothache (R lower tooth) onset today. Follows up with a dentist outpatient regularly, and last visit was over 2 months ago, everything was normal. NKDA. Unclear if he has a hx of HTN. No further acute complaints at this time.

## 2019-03-01 NOTE — ED STATDOCS - PHYSICAL EXAMINATION
Constitutional: in no apparent distress, speaking in full sentences  HEENT: neck supple, FROM, tongue is pink, moist and midline  SKIN: no jaundice  NEURO: awake and alert Constitutional: in no apparent distress, speaking in full sentences  HEENT: neck supple, FROM, tongue is pink, moist and midline, face symmetric, right lower molar pain with mild surrounding edema   EYES: non-injected, non-icteric, PERRL, EOMI  RESPIRATORY: lungs clear, no respiratory distress, no stridor   CARDIAC: S1 S2, regular rate, moving chest wall symmetrically, no pedal edema   NEURO: awake and alert

## 2019-03-01 NOTE — ED STATDOCS - CLINICAL SUMMARY MEDICAL DECISION MAKING FREE TEXT BOX
BP as noted, suspect elevation secondary to pain, instructed patient to followup with PCP for management. Patient presenting with R lower molar pain, treat with PO abx, pain meds and refer to dentistry.

## 2019-04-17 ENCOUNTER — APPOINTMENT (OUTPATIENT)
Dept: NEUROLOGY | Facility: CLINIC | Age: 49
End: 2019-04-17

## 2019-05-14 ENCOUNTER — OUTPATIENT (OUTPATIENT)
Dept: OUTPATIENT SERVICES | Facility: HOSPITAL | Age: 49
LOS: 1 days | Discharge: HOME | End: 2019-05-14
Payer: OTHER MISCELLANEOUS

## 2019-05-14 VITALS
DIASTOLIC BLOOD PRESSURE: 97 MMHG | TEMPERATURE: 98 F | RESPIRATION RATE: 20 BRPM | HEIGHT: 65 IN | HEART RATE: 71 BPM | SYSTOLIC BLOOD PRESSURE: 133 MMHG | WEIGHT: 207.9 LBS | OXYGEN SATURATION: 100 %

## 2019-05-14 DIAGNOSIS — M54.2 CERVICALGIA: ICD-10-CM

## 2019-05-14 DIAGNOSIS — Z01.818 ENCOUNTER FOR OTHER PREPROCEDURAL EXAMINATION: ICD-10-CM

## 2019-05-14 DIAGNOSIS — M54.16 RADICULOPATHY, LUMBAR REGION: ICD-10-CM

## 2019-05-14 DIAGNOSIS — Z98.1 ARTHRODESIS STATUS: Chronic | ICD-10-CM

## 2019-05-14 DIAGNOSIS — Z29.9 ENCOUNTER FOR PROPHYLACTIC MEASURES, UNSPECIFIED: ICD-10-CM

## 2019-05-14 LAB
ANION GAP SERPL CALC-SCNC: 7 MMOL/L — SIGNIFICANT CHANGE UP (ref 5–17)
APPEARANCE UR: CLEAR — SIGNIFICANT CHANGE UP
APTT BLD: 35.7 SEC — SIGNIFICANT CHANGE UP (ref 27.5–36.3)
BASOPHILS # BLD AUTO: 0.03 K/UL — SIGNIFICANT CHANGE UP (ref 0–0.2)
BASOPHILS NFR BLD AUTO: 0.4 % — SIGNIFICANT CHANGE UP (ref 0–2)
BILIRUB UR-MCNC: NEGATIVE — SIGNIFICANT CHANGE UP
BLD GP AB SCN SERPL QL: SIGNIFICANT CHANGE UP
BUN SERPL-MCNC: 14 MG/DL — SIGNIFICANT CHANGE UP (ref 7–23)
CALCIUM SERPL-MCNC: 8.9 MG/DL — SIGNIFICANT CHANGE UP (ref 8.5–10.1)
CHLORIDE SERPL-SCNC: 103 MMOL/L — SIGNIFICANT CHANGE UP (ref 96–108)
CO2 SERPL-SCNC: 28 MMOL/L — SIGNIFICANT CHANGE UP (ref 22–31)
COLOR SPEC: YELLOW — SIGNIFICANT CHANGE UP
CREAT SERPL-MCNC: 0.98 MG/DL — SIGNIFICANT CHANGE UP (ref 0.5–1.3)
DIFF PNL FLD: NEGATIVE — SIGNIFICANT CHANGE UP
EOSINOPHIL # BLD AUTO: 0.62 K/UL — HIGH (ref 0–0.5)
EOSINOPHIL NFR BLD AUTO: 8.2 % — HIGH (ref 0–6)
GLUCOSE SERPL-MCNC: 79 MG/DL — SIGNIFICANT CHANGE UP (ref 70–99)
GLUCOSE UR QL: NEGATIVE MG/DL — SIGNIFICANT CHANGE UP
HCT VFR BLD CALC: 48.1 % — SIGNIFICANT CHANGE UP (ref 39–50)
HGB BLD-MCNC: 15.8 G/DL — SIGNIFICANT CHANGE UP (ref 13–17)
IMM GRANULOCYTES NFR BLD AUTO: 0.4 % — SIGNIFICANT CHANGE UP (ref 0–1.5)
INR BLD: 1.05 RATIO — SIGNIFICANT CHANGE UP (ref 0.88–1.16)
KETONES UR-MCNC: NEGATIVE — SIGNIFICANT CHANGE UP
LEUKOCYTE ESTERASE UR-ACNC: NEGATIVE — SIGNIFICANT CHANGE UP
LYMPHOCYTES # BLD AUTO: 2.43 K/UL — SIGNIFICANT CHANGE UP (ref 1–3.3)
LYMPHOCYTES # BLD AUTO: 32.1 % — SIGNIFICANT CHANGE UP (ref 13–44)
MCHC RBC-ENTMCNC: 30.9 PG — SIGNIFICANT CHANGE UP (ref 27–34)
MCHC RBC-ENTMCNC: 32.8 GM/DL — SIGNIFICANT CHANGE UP (ref 32–36)
MCV RBC AUTO: 94.1 FL — SIGNIFICANT CHANGE UP (ref 80–100)
MONOCYTES # BLD AUTO: 0.67 K/UL — SIGNIFICANT CHANGE UP (ref 0–0.9)
MONOCYTES NFR BLD AUTO: 8.8 % — SIGNIFICANT CHANGE UP (ref 2–14)
NEUTROPHILS # BLD AUTO: 3.8 K/UL — SIGNIFICANT CHANGE UP (ref 1.8–7.4)
NEUTROPHILS NFR BLD AUTO: 50.1 % — SIGNIFICANT CHANGE UP (ref 43–77)
NITRITE UR-MCNC: NEGATIVE — SIGNIFICANT CHANGE UP
PH UR: 7 — SIGNIFICANT CHANGE UP (ref 5–8)
PLATELET # BLD AUTO: 264 K/UL — SIGNIFICANT CHANGE UP (ref 150–400)
POTASSIUM SERPL-MCNC: 4 MMOL/L — SIGNIFICANT CHANGE UP (ref 3.5–5.3)
POTASSIUM SERPL-SCNC: 4 MMOL/L — SIGNIFICANT CHANGE UP (ref 3.5–5.3)
PROT UR-MCNC: NEGATIVE MG/DL — SIGNIFICANT CHANGE UP
PROTHROM AB SERPL-ACNC: 11.7 SEC — SIGNIFICANT CHANGE UP (ref 10–12.9)
RBC # BLD: 5.11 M/UL — SIGNIFICANT CHANGE UP (ref 4.2–5.8)
RBC # FLD: 12.9 % — SIGNIFICANT CHANGE UP (ref 10.3–14.5)
SODIUM SERPL-SCNC: 138 MMOL/L — SIGNIFICANT CHANGE UP (ref 135–145)
SP GR SPEC: 1 — LOW (ref 1.01–1.02)
TYPE + AB SCN PNL BLD: SIGNIFICANT CHANGE UP
UROBILINOGEN FLD QL: NEGATIVE MG/DL — SIGNIFICANT CHANGE UP
WBC # BLD: 7.58 K/UL — SIGNIFICANT CHANGE UP (ref 3.8–10.5)
WBC # FLD AUTO: 7.58 K/UL — SIGNIFICANT CHANGE UP (ref 3.8–10.5)

## 2019-05-14 PROCEDURE — 71046 X-RAY EXAM CHEST 2 VIEWS: CPT | Mod: 26

## 2019-05-14 PROCEDURE — 93010 ELECTROCARDIOGRAM REPORT: CPT

## 2019-05-14 NOTE — H&P PST ADULT - NSICDXPASTMEDICALHX_GEN_ALL_CORE_FT
PAST MEDICAL HISTORY:  Chronic back pain     Chronic neck pain     Hypercholesteremia     Obesity     Seizure     Seizure disorder last seizure - more than 5 yrs PAST MEDICAL HISTORY:  Chronic back pain     Chronic neck pain     Hypercholesteremia     Lumbar radiculopathy     Obesity     Seizure     Seizure disorder last seizure - more than 5 yrs

## 2019-05-14 NOTE — H&P PST ADULT - ASSESSMENT
49 y/o male with radiculopathy of lumbar region. S/P lumbar fusion in 2018. Pt reports "I got struck by a falling object at work in 2017." Complain of chronic lower back pain that radiates to both legs with tingling. Scheduled for posterior lumbar fusion revision L4-S1.   Plan  1. Stop all NSAIDS, herbal supplements and vitamins for 7 days.  2. NPO at midnight.  3. Take the following medications ( Divalproex, Keppra ) with small sips of water on the morning of your procedure/surgery.  4. Use EZ sponges as directed  5. Use mupirocin as directed  6. Labs, EKG, CXR as per surgeon  7. PMD visit for optimization prior to surgery as per surgeon.    CAPRINI SCORE [CLOT]  AGE RELATED RISK FACTORS                                                       MOBILITY RELATED FACTORS  [ x  ] Age 41-60 years                                            (1 Point)                  [ ] Bed rest                                                        (1 Point)  [ ] Age: 61-74 years                                           (2 Points)                 [ ] Plaster cast                                                   (2 Points)  [ ] Age= 75 years                                              (3 Points)                 [ ] Bed bound for more than 72 hours                 (2 Points)    DISEASE RELATED RISK FACTORS                                               GENDER SPECIFIC FACTORS  [ ] Edema in the lower extremities                       (1 Point)                  [ ] Pregnancy                                                     (1 Point)  [ ] Varicose veins                                               (1 Point)                  [ ] Post-partum < 6 weeks                                   (1 Point)             [ x ] BMI > 25 Kg/m2                                            (1 Point)                  [ ] Hormonal therapy  or oral contraception          (1 Point)                 [ ] Sepsis (in the previous month)                        (1 Point)                  [ ] History of pregnancy complications                 (1 point)  [ ] Pneumonia or serious lung disease                                               [ ] Unexplained or recurrent                     (1 Point)           (in the previous month)                               (1 Point)  [ ] Abnormal pulmonary function test                     (1 Point)                 SURGERY RELATED RISK FACTORS  [ ] Acute myocardial infarction                              (1 Point)                 [ ]  Section                                             (1 Point)  [ ] Congestive heart failure (in the previous month)  (1 Point)               [ ] Minor surgery                                                  (1 Point)   [ ] Inflammatory bowel disease                             (1 Point)                 [ ] Arthroscopic surgery                                        (2 Points)  [ ] Central venous access                                      (2 Points)                [x ] General surgery lasting more than 45 minutes   (2 Points)       [ ] Stroke (in the previous month)                          (5 Points)               [ ] Elective arthroplasty                                         (5 Points)     ()  malignancy                                                             (2 points )                                                                                                                                      HEMATOLOGY RELATED FACTORS                                                 TRAUMA RELATED RISK FACTORS  [ ] Prior episodes of VTE                                     (3 Points)                 [ ] Fracture of the hip, pelvis, or leg                       (5 Points)  [ ] Positive family history for VTE                         (3 Points)                 [ ] Acute spinal cord injury (in the previous month)  (5 Points)  [ ] Prothrombin 92313 A                                     (3 Points)                 [ ] Paralysis  (less than 1 month)                             (5 Points)  [ ] Factor V Leiden                                             (3 Points)                  [ ] Multiple Trauma within 1 month                        (5 Points)  [ ] Lupus anticoagulants                                     (3 Points)                                                           [ ] Anticardiolipin antibodies                               (3 Points)                                                       [ ] High homocysteine in the blood                      (3 Points)                                             [ ] Other congenital or acquired thrombophilia      (3 Points)                                                [ ] Heparin induced thrombocytopenia                  (3 Points)    (  ) Malignancy                                        Total Score [    4      ] 49 y/o male with radiculopathy of lumbar region. S/P lumbar fusion in 2018. Scheduled for posterior lumbar fusion revision L4-S1.   Plan  1. Stop all NSAIDS, herbal supplements and vitamins for 7 days.  2. NPO at midnight.  3. Take the following medications ( Divalproex, Keppra ) with small sips of water on the morning of your procedure/surgery.  4. Use EZ sponges as directed  5. Use mupirocin as directed  6. Labs, EKG, CXR as per surgeon  7. PMD visit for optimization prior to surgery as per surgeon.    CAPRINI SCORE [CLOT]  AGE RELATED RISK FACTORS                                                       MOBILITY RELATED FACTORS  [ x  ] Age 41-60 years                                            (1 Point)                  [ ] Bed rest                                                        (1 Point)  [ ] Age: 61-74 years                                           (2 Points)                 [ ] Plaster cast                                                   (2 Points)  [ ] Age= 75 years                                              (3 Points)                 [ ] Bed bound for more than 72 hours                 (2 Points)    DISEASE RELATED RISK FACTORS                                               GENDER SPECIFIC FACTORS  [ ] Edema in the lower extremities                       (1 Point)                  [ ] Pregnancy                                                     (1 Point)  [ ] Varicose veins                                               (1 Point)                  [ ] Post-partum < 6 weeks                                   (1 Point)             [ x ] BMI > 25 Kg/m2                                            (1 Point)                  [ ] Hormonal therapy  or oral contraception          (1 Point)                 [ ] Sepsis (in the previous month)                        (1 Point)                  [ ] History of pregnancy complications                 (1 point)  [ ] Pneumonia or serious lung disease                                               [ ] Unexplained or recurrent                     (1 Point)           (in the previous month)                               (1 Point)  [ ] Abnormal pulmonary function test                     (1 Point)                 SURGERY RELATED RISK FACTORS  [ ] Acute myocardial infarction                              (1 Point)                 [ ]  Section                                             (1 Point)  [ ] Congestive heart failure (in the previous month)  (1 Point)               [ ] Minor surgery                                                  (1 Point)   [ ] Inflammatory bowel disease                             (1 Point)                 [ ] Arthroscopic surgery                                        (2 Points)  [ ] Central venous access                                      (2 Points)                [x ] General surgery lasting more than 45 minutes   (2 Points)       [ ] Stroke (in the previous month)                          (5 Points)               [ ] Elective arthroplasty                                         (5 Points)     ()  malignancy                                                             (2 points )                                                                                                                                      HEMATOLOGY RELATED FACTORS                                                 TRAUMA RELATED RISK FACTORS  [ ] Prior episodes of VTE                                     (3 Points)                 [ ] Fracture of the hip, pelvis, or leg                       (5 Points)  [ ] Positive family history for VTE                         (3 Points)                 [ ] Acute spinal cord injury (in the previous month)  (5 Points)  [ ] Prothrombin 88757 A                                     (3 Points)                 [ ] Paralysis  (less than 1 month)                             (5 Points)  [ ] Factor V Leiden                                             (3 Points)                  [ ] Multiple Trauma within 1 month                        (5 Points)  [ ] Lupus anticoagulants                                     (3 Points)                                                           [ ] Anticardiolipin antibodies                               (3 Points)                                                       [ ] High homocysteine in the blood                      (3 Points)                                             [ ] Other congenital or acquired thrombophilia      (3 Points)                                                [ ] Heparin induced thrombocytopenia                  (3 Points)    (  ) Malignancy                                        Total Score [    4      ]

## 2019-05-14 NOTE — H&P PST ADULT - HISTORY OF PRESENT ILLNESS
49 y/o male with complain of chronic lower back pain that radiates to both legs with tingling. Pt reports "lost of balance", he walks with a cane. Scheduled for posterior lumbar fusion revision L4-S1. 49 y/o male with radiculopathy of lumbar region. S/P lumbar fusion in 2018. Pt reports "I got struck by a falling object at work in 2017." Complain of chronic lower back pain that radiates to both legs more to right leg, with tingling. Pt reports "lost of balance", he walks with a cane. Scheduled for posterior lumbar fusion revision L4-S1.

## 2019-05-15 LAB
MRSA PCR RESULT.: SIGNIFICANT CHANGE UP
S AUREUS DNA NOSE QL NAA+PROBE: SIGNIFICANT CHANGE UP

## 2019-05-22 ENCOUNTER — EMERGENCY (EMERGENCY)
Facility: HOSPITAL | Age: 49
LOS: 1 days | Discharge: HOME | End: 2019-05-22
Attending: STUDENT IN AN ORGANIZED HEALTH CARE EDUCATION/TRAINING PROGRAM
Payer: COMMERCIAL

## 2019-05-22 VITALS
SYSTOLIC BLOOD PRESSURE: 170 MMHG | TEMPERATURE: 98 F | RESPIRATION RATE: 18 BRPM | HEART RATE: 81 BPM | DIASTOLIC BLOOD PRESSURE: 104 MMHG | OXYGEN SATURATION: 98 % | HEIGHT: 65 IN | WEIGHT: 199.96 LBS

## 2019-05-22 VITALS
SYSTOLIC BLOOD PRESSURE: 130 MMHG | HEART RATE: 66 BPM | OXYGEN SATURATION: 98 % | RESPIRATION RATE: 18 BRPM | DIASTOLIC BLOOD PRESSURE: 89 MMHG

## 2019-05-22 DIAGNOSIS — Z98.1 ARTHRODESIS STATUS: Chronic | ICD-10-CM

## 2019-05-22 PROBLEM — M54.16 RADICULOPATHY, LUMBAR REGION: Chronic | Status: ACTIVE | Noted: 2019-05-14

## 2019-05-22 PROBLEM — E66.9 OBESITY, UNSPECIFIED: Chronic | Status: ACTIVE | Noted: 2019-05-14

## 2019-05-22 PROBLEM — E78.00 PURE HYPERCHOLESTEROLEMIA, UNSPECIFIED: Chronic | Status: ACTIVE | Noted: 2019-05-14

## 2019-05-22 LAB
ALBUMIN SERPL ELPH-MCNC: 4.6 G/DL — SIGNIFICANT CHANGE UP (ref 3.3–5.2)
ALP SERPL-CCNC: 114 U/L — SIGNIFICANT CHANGE UP (ref 40–120)
ALT FLD-CCNC: 66 U/L — HIGH
ANION GAP SERPL CALC-SCNC: 12 MMOL/L — SIGNIFICANT CHANGE UP (ref 5–17)
APTT BLD: 34.5 SEC — SIGNIFICANT CHANGE UP (ref 27.5–36.3)
AST SERPL-CCNC: 39 U/L — SIGNIFICANT CHANGE UP
BILIRUB SERPL-MCNC: 0.4 MG/DL — SIGNIFICANT CHANGE UP (ref 0.4–2)
BUN SERPL-MCNC: 11 MG/DL — SIGNIFICANT CHANGE UP (ref 8–20)
CALCIUM SERPL-MCNC: 10.3 MG/DL — HIGH (ref 8.6–10.2)
CHLORIDE SERPL-SCNC: 99 MMOL/L — SIGNIFICANT CHANGE UP (ref 98–107)
CO2 SERPL-SCNC: 29 MMOL/L — SIGNIFICANT CHANGE UP (ref 22–29)
CREAT SERPL-MCNC: 0.8 MG/DL — SIGNIFICANT CHANGE UP (ref 0.5–1.3)
GLUCOSE SERPL-MCNC: 103 MG/DL — SIGNIFICANT CHANGE UP (ref 70–115)
HCT VFR BLD CALC: 47.4 % — SIGNIFICANT CHANGE UP (ref 42–52)
HGB BLD-MCNC: 15.9 G/DL — SIGNIFICANT CHANGE UP (ref 14–18)
INR BLD: 1.02 RATIO — SIGNIFICANT CHANGE UP (ref 0.88–1.16)
MCHC RBC-ENTMCNC: 31.5 PG — HIGH (ref 27–31)
MCHC RBC-ENTMCNC: 33.5 G/DL — SIGNIFICANT CHANGE UP (ref 32–36)
MCV RBC AUTO: 93.9 FL — SIGNIFICANT CHANGE UP (ref 80–94)
NT-PROBNP SERPL-SCNC: 6 PG/ML — SIGNIFICANT CHANGE UP (ref 0–300)
PLATELET # BLD AUTO: 264 K/UL — SIGNIFICANT CHANGE UP (ref 150–400)
POTASSIUM SERPL-MCNC: 5.1 MMOL/L — SIGNIFICANT CHANGE UP (ref 3.5–5.3)
POTASSIUM SERPL-SCNC: 5.1 MMOL/L — SIGNIFICANT CHANGE UP (ref 3.5–5.3)
PROT SERPL-MCNC: 8.8 G/DL — HIGH (ref 6.6–8.7)
PROTHROM AB SERPL-ACNC: 11.7 SEC — SIGNIFICANT CHANGE UP (ref 10–12.9)
RBC # BLD: 5.05 M/UL — SIGNIFICANT CHANGE UP (ref 4.6–6.2)
RBC # FLD: 13.6 % — SIGNIFICANT CHANGE UP (ref 11–15.6)
SODIUM SERPL-SCNC: 140 MMOL/L — SIGNIFICANT CHANGE UP (ref 135–145)
TROPONIN T SERPL-MCNC: <0.01 NG/ML — SIGNIFICANT CHANGE UP (ref 0–0.06)
WBC # BLD: 7.5 K/UL — SIGNIFICANT CHANGE UP (ref 4.8–10.8)
WBC # FLD AUTO: 7.5 K/UL — SIGNIFICANT CHANGE UP (ref 4.8–10.8)

## 2019-05-22 PROCEDURE — 83880 ASSAY OF NATRIURETIC PEPTIDE: CPT

## 2019-05-22 PROCEDURE — T1013: CPT

## 2019-05-22 PROCEDURE — 71046 X-RAY EXAM CHEST 2 VIEWS: CPT | Mod: 26

## 2019-05-22 PROCEDURE — 93010 ELECTROCARDIOGRAM REPORT: CPT

## 2019-05-22 PROCEDURE — 80053 COMPREHEN METABOLIC PANEL: CPT

## 2019-05-22 PROCEDURE — 71046 X-RAY EXAM CHEST 2 VIEWS: CPT

## 2019-05-22 PROCEDURE — 85730 THROMBOPLASTIN TIME PARTIAL: CPT

## 2019-05-22 PROCEDURE — 99285 EMERGENCY DEPT VISIT HI MDM: CPT

## 2019-05-22 PROCEDURE — 85027 COMPLETE CBC AUTOMATED: CPT

## 2019-05-22 PROCEDURE — 36415 COLL VENOUS BLD VENIPUNCTURE: CPT

## 2019-05-22 PROCEDURE — 99284 EMERGENCY DEPT VISIT MOD MDM: CPT | Mod: 25

## 2019-05-22 PROCEDURE — 84484 ASSAY OF TROPONIN QUANT: CPT

## 2019-05-22 PROCEDURE — 85610 PROTHROMBIN TIME: CPT

## 2019-05-22 PROCEDURE — 93005 ELECTROCARDIOGRAM TRACING: CPT

## 2019-05-22 RX ORDER — ACETAMINOPHEN 500 MG
975 TABLET ORAL ONCE
Refills: 0 | Status: DISCONTINUED | OUTPATIENT
Start: 2019-05-22 | End: 2019-05-27

## 2019-05-22 RX ORDER — ACETAMINOPHEN 500 MG
975 TABLET ORAL ONCE
Refills: 0 | Status: COMPLETED | OUTPATIENT
Start: 2019-05-22 | End: 2019-05-22

## 2019-05-22 RX ADMIN — Medication 975 MILLIGRAM(S): at 11:53

## 2019-05-22 NOTE — ED ADULT NURSE NOTE - OBJECTIVE STATEMENT
Patient is a 48 year old male, A&Ox4, in no apparent distress. C/o of HA and chest pain. HA pain 7/10, chest pain 3/10. Chest pain feels like a constant, stabbing pain that he has been experiencing for 3 days. Patient states he went to see his PCP Adyd yesterday, who said his blood pressure was very high. Patient takes Amlodipine and HCTZ at home. Patient took BP meds this morning. Denies SOB, N/V/D.  Ginger at bedside.

## 2019-05-22 NOTE — ED PROVIDER NOTE - NS ED ROS FT
No fever/chills, No photophobia/eye pain/changes in vision, No ear pain/sore throat/dysphagia, + chest pain no palpitations, no SOB/cough/wheeze/stridor, No abdominal pain, No N/V/D, no dysuria/frequency/discharge, No neck/back pain, no rash, no changes in neurological status/function.    +headache

## 2019-05-22 NOTE — ED ADULT NURSE NOTE - PMH
Chronic back pain    Chronic neck pain    Hypercholesteremia    Lumbar radiculopathy    Obesity    Seizure    Seizure disorder  last seizure - more than 5 yrs

## 2019-05-22 NOTE — ED PROVIDER NOTE - OBJECTIVE STATEMENT
Pt is a 47 yo M co hypertension. PMHx significant for seizure disorder, htn. Pt states that he was scheduled to have an outpatient spine surgery but it was cancelled as his bp has been elevated. Pt states that this morning he woke up with a mild diffuse headache and he checked his bp and it was elevated.  Pt states that for the past 3 d he has had constant L-sided sharp chest pain that is located in one small spot and is nonradiating. no numbness/weakness. no sob. no n/v. no other complaints.

## 2019-05-22 NOTE — ED ADULT NURSE NOTE - NSIMPLEMENTINTERV_GEN_ALL_ED
Implemented All Fall with Harm Risk Interventions:  [place] to call system. Call bell, personal items and telephone within reach. Instruct patient to call for assistance. Room bathroom lighting operational. Non-slip footwear when patient is off stretcher. Physically safe environment: no spills, clutter or unnecessary equipment. Stretcher in lowest position, wheels locked, appropriate side rails in place. Provide visual cue, wrist band, yellow gown, etc. Monitor gait and stability. Monitor for mental status changes and reorient to person, place, and time. Review medications for side effects contributing to fall risk. Reinforce activity limits and safety measures with patient and family. Provide visual clues: red socks.

## 2019-05-22 NOTE — ED PROVIDER NOTE - CLINICAL SUMMARY MEDICAL DECISION MAKING FREE TEXT BOX
labs and imaging reviewed.  Pt with atypical chest pain and negative troponin.  Pt reassured and instructed to f/up with Dr. Chi. pt instructed to return for worsening pain, sob, numbness/weakness, or any other concerns.  Pt given a copy of all results and instructed to f/up with pcp regarding any abnormal results.

## 2019-05-22 NOTE — ED STATDOCS - PROGRESS NOTE DETAILS
49yo M PMH- HTN, HLD, seizures with intermittent CP x3 days, sharp left sided nonradiating, no SOB/nausea. not exertional. also with HA x3 days, constant, not worsening, gradual onset. no focal weakness. intermittent. no weakness one side of body or other. chronic LE weakness planned for back surgery canceled due to high BP.   meds: amlodipine, hydrochlorothiazide. PCP- Dr. Servin, [facility]- Ganesh Davalos   CP, HONG and HTN, no acute neuro deficits, no resp distress. will perform EKG, labs and send to main on monitor for eval by another provider -Oh TRINIDAD

## 2019-05-22 NOTE — ED ADULT NURSE NOTE - HOW OFTEN DO YOU HAVE A DRINK CONTAINING ALCOHOL?
Kb was called with the results of his CTA.  His aneurysm has increased in size and Dr Singh would like him to see Dr Ramos for consideration for evaluation for a stent graft repair.  Transferred to the  to make an appointment.  Also adenosine stress test was ordered to be done prior to clinic visit.  
Never

## 2019-07-12 ENCOUNTER — OUTPATIENT (OUTPATIENT)
Dept: OUTPATIENT SERVICES | Facility: HOSPITAL | Age: 49
LOS: 1 days | Discharge: HOME | End: 2019-07-12

## 2019-07-12 VITALS
SYSTOLIC BLOOD PRESSURE: 131 MMHG | HEART RATE: 75 BPM | HEIGHT: 66 IN | TEMPERATURE: 98 F | OXYGEN SATURATION: 99 % | WEIGHT: 210.1 LBS | RESPIRATION RATE: 20 BRPM | DIASTOLIC BLOOD PRESSURE: 83 MMHG

## 2019-07-12 DIAGNOSIS — Z98.1 ARTHRODESIS STATUS: Chronic | ICD-10-CM

## 2019-07-12 DIAGNOSIS — M54.2 CERVICALGIA: ICD-10-CM

## 2019-07-12 DIAGNOSIS — M54.16 RADICULOPATHY, LUMBAR REGION: ICD-10-CM

## 2019-07-12 LAB
ANION GAP SERPL CALC-SCNC: 5 MMOL/L — SIGNIFICANT CHANGE UP (ref 5–17)
APPEARANCE UR: CLEAR — SIGNIFICANT CHANGE UP
APTT BLD: 32.6 SEC — SIGNIFICANT CHANGE UP (ref 27.5–36.3)
BASOPHILS # BLD AUTO: 0.06 K/UL — SIGNIFICANT CHANGE UP (ref 0–0.2)
BASOPHILS NFR BLD AUTO: 0.7 % — SIGNIFICANT CHANGE UP (ref 0–2)
BILIRUB UR-MCNC: NEGATIVE — SIGNIFICANT CHANGE UP
BLD GP AB SCN SERPL QL: SIGNIFICANT CHANGE UP
BUN SERPL-MCNC: 16 MG/DL — SIGNIFICANT CHANGE UP (ref 7–23)
CALCIUM SERPL-MCNC: 10 MG/DL — SIGNIFICANT CHANGE UP (ref 8.5–10.1)
CHLORIDE SERPL-SCNC: 104 MMOL/L — SIGNIFICANT CHANGE UP (ref 96–108)
CO2 SERPL-SCNC: 29 MMOL/L — SIGNIFICANT CHANGE UP (ref 22–31)
COLOR SPEC: YELLOW — SIGNIFICANT CHANGE UP
CREAT SERPL-MCNC: 1 MG/DL — SIGNIFICANT CHANGE UP (ref 0.5–1.3)
DIFF PNL FLD: NEGATIVE — SIGNIFICANT CHANGE UP
EOSINOPHIL # BLD AUTO: 0.75 K/UL — HIGH (ref 0–0.5)
EOSINOPHIL NFR BLD AUTO: 8.2 % — HIGH (ref 0–6)
GLUCOSE SERPL-MCNC: 84 MG/DL — SIGNIFICANT CHANGE UP (ref 70–99)
GLUCOSE UR QL: NEGATIVE MG/DL — SIGNIFICANT CHANGE UP
HCT VFR BLD CALC: 46.1 % — SIGNIFICANT CHANGE UP (ref 39–50)
HGB BLD-MCNC: 15.5 G/DL — SIGNIFICANT CHANGE UP (ref 13–17)
IMM GRANULOCYTES NFR BLD AUTO: 0.4 % — SIGNIFICANT CHANGE UP (ref 0–1.5)
INR BLD: 0.97 RATIO — SIGNIFICANT CHANGE UP (ref 0.88–1.16)
KETONES UR-MCNC: NEGATIVE — SIGNIFICANT CHANGE UP
LEUKOCYTE ESTERASE UR-ACNC: NEGATIVE — SIGNIFICANT CHANGE UP
LYMPHOCYTES # BLD AUTO: 2.66 K/UL — SIGNIFICANT CHANGE UP (ref 1–3.3)
LYMPHOCYTES # BLD AUTO: 29 % — SIGNIFICANT CHANGE UP (ref 13–44)
MCHC RBC-ENTMCNC: 31.3 PG — SIGNIFICANT CHANGE UP (ref 27–34)
MCHC RBC-ENTMCNC: 33.6 GM/DL — SIGNIFICANT CHANGE UP (ref 32–36)
MCV RBC AUTO: 92.9 FL — SIGNIFICANT CHANGE UP (ref 80–100)
MONOCYTES # BLD AUTO: 0.92 K/UL — HIGH (ref 0–0.9)
MONOCYTES NFR BLD AUTO: 10 % — SIGNIFICANT CHANGE UP (ref 2–14)
MRSA PCR RESULT.: SIGNIFICANT CHANGE UP
NEUTROPHILS # BLD AUTO: 4.74 K/UL — SIGNIFICANT CHANGE UP (ref 1.8–7.4)
NEUTROPHILS NFR BLD AUTO: 51.7 % — SIGNIFICANT CHANGE UP (ref 43–77)
NITRITE UR-MCNC: NEGATIVE — SIGNIFICANT CHANGE UP
PH UR: 6 — SIGNIFICANT CHANGE UP (ref 5–8)
PLATELET # BLD AUTO: 306 K/UL — SIGNIFICANT CHANGE UP (ref 150–400)
POTASSIUM SERPL-MCNC: 4 MMOL/L — SIGNIFICANT CHANGE UP (ref 3.5–5.3)
POTASSIUM SERPL-SCNC: 4 MMOL/L — SIGNIFICANT CHANGE UP (ref 3.5–5.3)
PROT UR-MCNC: NEGATIVE MG/DL — SIGNIFICANT CHANGE UP
PROTHROM AB SERPL-ACNC: 10.7 SEC — SIGNIFICANT CHANGE UP (ref 10–12.9)
RBC # BLD: 4.96 M/UL — SIGNIFICANT CHANGE UP (ref 4.2–5.8)
RBC # FLD: 12.6 % — SIGNIFICANT CHANGE UP (ref 10.3–14.5)
S AUREUS DNA NOSE QL NAA+PROBE: SIGNIFICANT CHANGE UP
SODIUM SERPL-SCNC: 138 MMOL/L — SIGNIFICANT CHANGE UP (ref 135–145)
SP GR SPEC: 1.01 — SIGNIFICANT CHANGE UP (ref 1.01–1.02)
TYPE + AB SCN PNL BLD: SIGNIFICANT CHANGE UP
UROBILINOGEN FLD QL: NEGATIVE MG/DL — SIGNIFICANT CHANGE UP
WBC # BLD: 9.17 K/UL — SIGNIFICANT CHANGE UP (ref 3.8–10.5)
WBC # FLD AUTO: 9.17 K/UL — SIGNIFICANT CHANGE UP (ref 3.8–10.5)

## 2019-07-12 NOTE — H&P PST ADULT - HISTORY OF PRESENT ILLNESS
49 y/o male with radiculopathy of lumbar region. S/P lumbar fusion in 2018. Pt reports "I got struck by a falling object at work in 2017." Complain of chronic lower back pain that radiates to both legs more to right leg, with tingling. Pt reports "lost of balance", he walks with a cane. Scheduled for posterior lumbar fusion revision L4-S1. 47 y/o male with radiculopathy of lumbar region. S/P lumbar fusion in 2018. Pt reports "I got struck by a falling object at work in 2017." Complain of chronic lower back pain that radiates to both legs more to right leg, with tingling. Pt reports "lost of balance", he walks with a cane. Scheduled for posterior lumbar fusion revision L4-S1. Pt reports his surgery was cancelled last month due to elevated BP. He was seen by his PMD who added another antihypertensive medication. 47 y/o male with radiculopathy of lumbar region. S/P lumbar fusion in 2018. Pt reports "I got struck by a falling object at work in 2017." Complain of chronic lower back pain that radiates to both legs more to right leg, with tingling. Pt reports "lost of balance", he walks with a cane. Scheduled for posterior lumbar fusion revision L4-S1. Pt reports his surgery was cancelled last month due to elevated BP. He was seen by his PMD, started on antihypertensive medication.

## 2019-07-12 NOTE — H&P PST ADULT - ASSESSMENT
49 y/o male with radiculopathy of lumbar region. S/P lumbar fusion in 2018. Scheduled for posterior lumbar fusion revision L4-S1.   Plan  1. Stop all NSAIDS, herbal supplements and vitamins for 7 days.  2. NPO at midnight.  3. Take the following medications ( Divalproex, Keppra ) with small sips of water on the morning of your procedure/surgery.  4. Use EZ sponges as directed  5. Use mupirocin as directed  6. Labs, EKG, CXR as per surgeon  7. PMD visit for optimization prior to surgery as per surgeon.    CAPRINI SCORE [CLOT]  AGE RELATED RISK FACTORS                                                       MOBILITY RELATED FACTORS  [ x  ] Age 41-60 years                                            (1 Point)                  [ ] Bed rest                                                        (1 Point)  [ ] Age: 61-74 years                                           (2 Points)                 [ ] Plaster cast                                                   (2 Points)  [ ] Age= 75 years                                              (3 Points)                 [ ] Bed bound for more than 72 hours                 (2 Points)    DISEASE RELATED RISK FACTORS                                               GENDER SPECIFIC FACTORS  [ ] Edema in the lower extremities                       (1 Point)                  [ ] Pregnancy                                                     (1 Point)  [ ] Varicose veins                                               (1 Point)                  [ ] Post-partum < 6 weeks                                   (1 Point)             [ x ] BMI > 25 Kg/m2                                            (1 Point)                  [ ] Hormonal therapy  or oral contraception          (1 Point)                 [ ] Sepsis (in the previous month)                        (1 Point)                  [ ] History of pregnancy complications                 (1 point)  [ ] Pneumonia or serious lung disease                                               [ ] Unexplained or recurrent                     (1 Point)           (in the previous month)                               (1 Point)  [ ] Abnormal pulmonary function test                     (1 Point)                 SURGERY RELATED RISK FACTORS  [ ] Acute myocardial infarction                              (1 Point)                 [ ]  Section                                             (1 Point)  [ ] Congestive heart failure (in the previous month)  (1 Point)               [ ] Minor surgery                                                  (1 Point)   [ ] Inflammatory bowel disease                             (1 Point)                 [ ] Arthroscopic surgery                                        (2 Points)  [ ] Central venous access                                      (2 Points)                [x ] General surgery lasting more than 45 minutes   (2 Points)       [ ] Stroke (in the previous month)                          (5 Points)               [ ] Elective arthroplasty                                         (5 Points)     ()  malignancy                                                             (2 points )                                                                                                                                      HEMATOLOGY RELATED FACTORS                                                 TRAUMA RELATED RISK FACTORS  [ ] Prior episodes of VTE                                     (3 Points)                 [ ] Fracture of the hip, pelvis, or leg                       (5 Points)  [ ] Positive family history for VTE                         (3 Points)                 [ ] Acute spinal cord injury (in the previous month)  (5 Points)  [ ] Prothrombin 50758 A                                     (3 Points)                 [ ] Paralysis  (less than 1 month)                             (5 Points)  [ ] Factor V Leiden                                             (3 Points)                  [ ] Multiple Trauma within 1 month                        (5 Points)  [ ] Lupus anticoagulants                                     (3 Points)                                                           [ ] Anticardiolipin antibodies                               (3 Points)                                                       [ ] High homocysteine in the blood                      (3 Points)                                             [ ] Other congenital or acquired thrombophilia      (3 Points)                                                [ ] Heparin induced thrombocytopenia                  (3 Points)    (  ) Malignancy                                        Total Score [    4      ] 49 y/o male with radiculopathy of lumbar region. Complain of lower back pain that radiates to legs, walks with a cane. Scheduled for posterior lumbar fusion revision L4-S1.   Plan  1. Stop all NSAIDS, herbal supplements and vitamins for 7 days.  2. NPO at midnight.  3. Take the following medications ( Divalproex, Levetiracetam, Amlodipine) with small sips of water on the morning of your procedure/surgery.  4. Use EZ sponges as directed  5. Use mupirocin as directed  6. Labs as per surgeon. EKG, CXR on chart.  7. PMD visit for optimization prior to surgery as per surgeon.    CAPRINI SCORE [CLOT]  AGE RELATED RISK FACTORS                                                       MOBILITY RELATED FACTORS  [ x  ] Age 41-60 years                                            (1 Point)                  [ ] Bed rest                                                        (1 Point)  [ ] Age: 61-74 years                                           (2 Points)                 [ ] Plaster cast                                                   (2 Points)  [ ] Age= 75 years                                              (3 Points)                 [ ] Bed bound for more than 72 hours                 (2 Points)    DISEASE RELATED RISK FACTORS                                               GENDER SPECIFIC FACTORS  [ ] Edema in the lower extremities                       (1 Point)                  [ ] Pregnancy                                                     (1 Point)  [ ] Varicose veins                                               (1 Point)                  [ ] Post-partum < 6 weeks                                   (1 Point)             [ x ] BMI > 25 Kg/m2                                            (1 Point)                  [ ] Hormonal therapy  or oral contraception          (1 Point)                 [ ] Sepsis (in the previous month)                        (1 Point)                  [ ] History of pregnancy complications                 (1 point)  [ ] Pneumonia or serious lung disease                                               [ ] Unexplained or recurrent                     (1 Point)           (in the previous month)                               (1 Point)  [ ] Abnormal pulmonary function test                     (1 Point)                 SURGERY RELATED RISK FACTORS  [ ] Acute myocardial infarction                              (1 Point)                 [ ]  Section                                             (1 Point)  [ ] Congestive heart failure (in the previous month)  (1 Point)               [ ] Minor surgery                                                  (1 Point)   [ ] Inflammatory bowel disease                             (1 Point)                 [ ] Arthroscopic surgery                                        (2 Points)  [ ] Central venous access                                      (2 Points)                [x ] General surgery lasting more than 45 minutes   (2 Points)       [ ] Stroke (in the previous month)                          (5 Points)               [ ] Elective arthroplasty                                         (5 Points)     ()  malignancy                                                             (2 points )                                                                                                                                      HEMATOLOGY RELATED FACTORS                                                 TRAUMA RELATED RISK FACTORS  [ ] Prior episodes of VTE                                     (3 Points)                 [ ] Fracture of the hip, pelvis, or leg                       (5 Points)  [ ] Positive family history for VTE                         (3 Points)                 [ ] Acute spinal cord injury (in the previous month)  (5 Points)  [ ] Prothrombin 47478 A                                     (3 Points)                 [ ] Paralysis  (less than 1 month)                             (5 Points)  [ ] Factor V Leiden                                             (3 Points)                  [ ] Multiple Trauma within 1 month                        (5 Points)  [ ] Lupus anticoagulants                                     (3 Points)                                                           [ ] Anticardiolipin antibodies                               (3 Points)                                                       [ ] High homocysteine in the blood                      (3 Points)                                             [ ] Other congenital or acquired thrombophilia      (3 Points)                                                [ ] Heparin induced thrombocytopenia                  (3 Points)    (  ) Malignancy                                        Total Score [    4      ]

## 2019-07-12 NOTE — H&P PST ADULT - NSCAFFEAMTFREQ_GEN_ALL_CORE_SD
Consent: The patient's consent was obtained including but not limited to risks of crusting, scabbing, blistering, scarring, darker or lighter pigmentary change, recurrence, incomplete removal and infection. Detail Level: Simple Post-Care Instructions: I reviewed with the patient in detail post-care instructions. Patient is to wear sunprotection, and avoid picking at any of the treated lesions. Pt may apply Vaseline to crusted or scabbing areas. Method: TCA 50% Anesthesia Volume In Cc: 1 1-2 cups/cans per day

## 2019-07-12 NOTE — H&P PST ADULT - NSICDXPASTMEDICALHX_GEN_ALL_CORE_FT
PAST MEDICAL HISTORY:  Chronic back pain     Chronic neck pain     HTN (hypertension)     Hypercholesteremia     Lumbar radiculopathy     Obesity     Seizure last seizure - about 7 yrs ago    Seizure disorder last seizure - more than 5 yrs PAST MEDICAL HISTORY:  Chronic back pain     Chronic neck pain     HTN (hypertension)     Hypercholesteremia     Lumbar radiculopathy     Obesity     Seizure last seizure - about 7 yrs ago

## 2019-07-19 RX ORDER — ONDANSETRON 8 MG/1
4 TABLET, FILM COATED ORAL EVERY 6 HOURS
Refills: 0 | Status: DISCONTINUED | OUTPATIENT
Start: 2019-07-22 | End: 2019-07-26

## 2019-07-19 RX ORDER — NALOXONE HYDROCHLORIDE 4 MG/.1ML
0.1 SPRAY NASAL
Refills: 0 | Status: DISCONTINUED | OUTPATIENT
Start: 2019-07-22 | End: 2019-07-26

## 2019-07-22 ENCOUNTER — INPATIENT (INPATIENT)
Facility: HOSPITAL | Age: 49
LOS: 3 days | Discharge: HOME | End: 2019-07-26
Attending: ORTHOPAEDIC SURGERY | Admitting: ORTHOPAEDIC SURGERY
Payer: OTHER MISCELLANEOUS

## 2019-07-22 VITALS
TEMPERATURE: 98 F | RESPIRATION RATE: 16 BRPM | SYSTOLIC BLOOD PRESSURE: 138 MMHG | OXYGEN SATURATION: 99 % | WEIGHT: 210.1 LBS | HEART RATE: 67 BPM | DIASTOLIC BLOOD PRESSURE: 87 MMHG | HEIGHT: 66 IN

## 2019-07-22 DIAGNOSIS — Z98.1 ARTHRODESIS STATUS: Chronic | ICD-10-CM

## 2019-07-22 LAB
ANION GAP SERPL CALC-SCNC: 9 MMOL/L — SIGNIFICANT CHANGE UP (ref 5–17)
BASOPHILS # BLD AUTO: 0.02 K/UL — SIGNIFICANT CHANGE UP (ref 0–0.2)
BASOPHILS NFR BLD AUTO: 0.2 % — SIGNIFICANT CHANGE UP (ref 0–2)
BUN SERPL-MCNC: 12 MG/DL — SIGNIFICANT CHANGE UP (ref 7–23)
CALCIUM SERPL-MCNC: 8.7 MG/DL — SIGNIFICANT CHANGE UP (ref 8.5–10.1)
CHLORIDE SERPL-SCNC: 105 MMOL/L — SIGNIFICANT CHANGE UP (ref 96–108)
CO2 SERPL-SCNC: 26 MMOL/L — SIGNIFICANT CHANGE UP (ref 22–31)
CREAT SERPL-MCNC: 1.15 MG/DL — SIGNIFICANT CHANGE UP (ref 0.5–1.3)
EOSINOPHIL # BLD AUTO: 0.06 K/UL — SIGNIFICANT CHANGE UP (ref 0–0.5)
EOSINOPHIL NFR BLD AUTO: 0.7 % — SIGNIFICANT CHANGE UP (ref 0–6)
GLUCOSE SERPL-MCNC: 120 MG/DL — HIGH (ref 70–99)
HCT VFR BLD CALC: 43.1 % — SIGNIFICANT CHANGE UP (ref 39–50)
HGB BLD-MCNC: 14.3 G/DL — SIGNIFICANT CHANGE UP (ref 13–17)
IMM GRANULOCYTES NFR BLD AUTO: 0.9 % — SIGNIFICANT CHANGE UP (ref 0–1.5)
LYMPHOCYTES # BLD AUTO: 1.39 K/UL — SIGNIFICANT CHANGE UP (ref 1–3.3)
LYMPHOCYTES # BLD AUTO: 16 % — SIGNIFICANT CHANGE UP (ref 13–44)
MCHC RBC-ENTMCNC: 31.4 PG — SIGNIFICANT CHANGE UP (ref 27–34)
MCHC RBC-ENTMCNC: 33.2 GM/DL — SIGNIFICANT CHANGE UP (ref 32–36)
MCV RBC AUTO: 94.7 FL — SIGNIFICANT CHANGE UP (ref 80–100)
MONOCYTES # BLD AUTO: 0.11 K/UL — SIGNIFICANT CHANGE UP (ref 0–0.9)
MONOCYTES NFR BLD AUTO: 1.3 % — LOW (ref 2–14)
NEUTROPHILS # BLD AUTO: 7.01 K/UL — SIGNIFICANT CHANGE UP (ref 1.8–7.4)
NEUTROPHILS NFR BLD AUTO: 80.9 % — HIGH (ref 43–77)
PLATELET # BLD AUTO: 256 K/UL — SIGNIFICANT CHANGE UP (ref 150–400)
POTASSIUM SERPL-MCNC: 3.4 MMOL/L — LOW (ref 3.5–5.3)
POTASSIUM SERPL-SCNC: 3.4 MMOL/L — LOW (ref 3.5–5.3)
RBC # BLD: 4.55 M/UL — SIGNIFICANT CHANGE UP (ref 4.2–5.8)
RBC # FLD: 13.1 % — SIGNIFICANT CHANGE UP (ref 10.3–14.5)
SODIUM SERPL-SCNC: 140 MMOL/L — SIGNIFICANT CHANGE UP (ref 135–145)
WBC # BLD: 8.67 K/UL — SIGNIFICANT CHANGE UP (ref 3.8–10.5)
WBC # FLD AUTO: 8.67 K/UL — SIGNIFICANT CHANGE UP (ref 3.8–10.5)

## 2019-07-22 PROCEDURE — 99024 POSTOP FOLLOW-UP VISIT: CPT

## 2019-07-22 RX ORDER — AMLODIPINE BESYLATE 2.5 MG/1
1 TABLET ORAL
Qty: 0 | Refills: 0 | DISCHARGE

## 2019-07-22 RX ORDER — SODIUM CHLORIDE 9 MG/ML
1000 INJECTION, SOLUTION INTRAVENOUS
Refills: 0 | Status: DISCONTINUED | OUTPATIENT
Start: 2019-07-22 | End: 2019-07-22

## 2019-07-22 RX ORDER — HYDROMORPHONE HYDROCHLORIDE 2 MG/ML
0.5 INJECTION INTRAMUSCULAR; INTRAVENOUS; SUBCUTANEOUS
Refills: 0 | Status: DISCONTINUED | OUTPATIENT
Start: 2019-07-22 | End: 2019-07-23

## 2019-07-22 RX ORDER — PANTOPRAZOLE SODIUM 20 MG/1
40 TABLET, DELAYED RELEASE ORAL
Refills: 0 | Status: DISCONTINUED | OUTPATIENT
Start: 2019-07-22 | End: 2019-07-26

## 2019-07-22 RX ORDER — AMLODIPINE BESYLATE 2.5 MG/1
10 TABLET ORAL DAILY
Refills: 0 | Status: DISCONTINUED | OUTPATIENT
Start: 2019-07-22 | End: 2019-07-26

## 2019-07-22 RX ORDER — HYDROMORPHONE HYDROCHLORIDE 2 MG/ML
30 INJECTION INTRAMUSCULAR; INTRAVENOUS; SUBCUTANEOUS
Refills: 0 | Status: DISCONTINUED | OUTPATIENT
Start: 2019-07-22 | End: 2019-07-23

## 2019-07-22 RX ORDER — DIVALPROEX SODIUM 500 MG/1
1000 TABLET, DELAYED RELEASE ORAL
Refills: 0 | Status: DISCONTINUED | OUTPATIENT
Start: 2019-07-22 | End: 2019-07-26

## 2019-07-22 RX ORDER — SODIUM CHLORIDE 9 MG/ML
1000 INJECTION, SOLUTION INTRAVENOUS
Refills: 0 | Status: DISCONTINUED | OUTPATIENT
Start: 2019-07-22 | End: 2019-07-26

## 2019-07-22 RX ORDER — DOCUSATE SODIUM 100 MG
100 CAPSULE ORAL THREE TIMES A DAY
Refills: 0 | Status: DISCONTINUED | OUTPATIENT
Start: 2019-07-22 | End: 2019-07-26

## 2019-07-22 RX ORDER — SENNA PLUS 8.6 MG/1
2 TABLET ORAL AT BEDTIME
Refills: 0 | Status: DISCONTINUED | OUTPATIENT
Start: 2019-07-22 | End: 2019-07-26

## 2019-07-22 RX ORDER — ENALAPRIL MALEATE AND HYDROCHLOROTHIAZIDE 10; 25 MG/1; MG/1
1 TABLET ORAL
Qty: 0 | Refills: 0 | DISCHARGE

## 2019-07-22 RX ORDER — CYCLOBENZAPRINE HYDROCHLORIDE 10 MG/1
10 TABLET, FILM COATED ORAL EVERY 8 HOURS
Refills: 0 | Status: DISCONTINUED | OUTPATIENT
Start: 2019-07-22 | End: 2019-07-26

## 2019-07-22 RX ORDER — MAGNESIUM HYDROXIDE 400 MG/1
30 TABLET, CHEWABLE ORAL EVERY 12 HOURS
Refills: 0 | Status: DISCONTINUED | OUTPATIENT
Start: 2019-07-22 | End: 2019-07-26

## 2019-07-22 RX ORDER — CEFAZOLIN SODIUM 1 G
2000 VIAL (EA) INJECTION EVERY 8 HOURS
Refills: 0 | Status: DISCONTINUED | OUTPATIENT
Start: 2019-07-22 | End: 2019-07-26

## 2019-07-22 RX ORDER — HYDROCHLOROTHIAZIDE 25 MG
25 TABLET ORAL DAILY
Refills: 0 | Status: DISCONTINUED | OUTPATIENT
Start: 2019-07-22 | End: 2019-07-26

## 2019-07-22 RX ORDER — LEVETIRACETAM 250 MG/1
1000 TABLET, FILM COATED ORAL
Refills: 0 | Status: DISCONTINUED | OUTPATIENT
Start: 2019-07-22 | End: 2019-07-26

## 2019-07-22 RX ORDER — ACETAMINOPHEN 500 MG
2 TABLET ORAL
Qty: 0 | Refills: 0 | DISCHARGE

## 2019-07-22 RX ORDER — LEVETIRACETAM 250 MG/1
2 TABLET, FILM COATED ORAL
Qty: 0 | Refills: 0 | DISCHARGE

## 2019-07-22 RX ORDER — ACETAMINOPHEN 500 MG
650 TABLET ORAL EVERY 6 HOURS
Refills: 0 | Status: DISCONTINUED | OUTPATIENT
Start: 2019-07-22 | End: 2019-07-26

## 2019-07-22 RX ORDER — DIPHENHYDRAMINE HCL 50 MG
12.5 CAPSULE ORAL EVERY 4 HOURS
Refills: 0 | Status: DISCONTINUED | OUTPATIENT
Start: 2019-07-22 | End: 2019-07-26

## 2019-07-22 RX ORDER — DIVALPROEX SODIUM 500 MG/1
2 TABLET, DELAYED RELEASE ORAL
Qty: 0 | Refills: 0 | DISCHARGE

## 2019-07-22 RX ADMIN — LEVETIRACETAM 1000 MILLIGRAM(S): 250 TABLET, FILM COATED ORAL at 18:28

## 2019-07-22 RX ADMIN — SENNA PLUS 2 TABLET(S): 8.6 TABLET ORAL at 22:25

## 2019-07-22 RX ADMIN — Medication 100 MILLIGRAM(S): at 22:25

## 2019-07-22 RX ADMIN — Medication 100 MILLIGRAM(S): at 18:20

## 2019-07-22 RX ADMIN — DIVALPROEX SODIUM 1000 MILLIGRAM(S): 500 TABLET, DELAYED RELEASE ORAL at 18:29

## 2019-07-22 RX ADMIN — HYDROMORPHONE HYDROCHLORIDE 30 MILLILITER(S): 2 INJECTION INTRAMUSCULAR; INTRAVENOUS; SUBCUTANEOUS at 12:47

## 2019-07-22 NOTE — BRIEF OPERATIVE NOTE - OPERATION/FINDINGS
Revision L4-S1 posterior spinal fusion. The left S1 screw was loose. Abundant fusion mass on the right side. The left side had less, but adequate fusion mass.

## 2019-07-22 NOTE — BRIEF OPERATIVE NOTE - NSICDXBRIEFPROCEDURE_GEN_ALL_CORE_FT
PROCEDURES:  Exploration, spine, lumbar, posterior or posterolateral approach, after lumbar spinal fusion 22-Jul-2019 12:41:35  Maco Day

## 2019-07-22 NOTE — CONSULT NOTE ADULT - ASSESSMENT
A/P  Pt is a 49 y/o M with lumbar radiculopathy s/p posterior lumbar fusion revision L4-S1    #s/p posterior lumbar fusion revision L4-S1, POD 0.  -plan as per ortho  -DVT as per anticoag services  -PT eval  -pain meds  -bowel regimen  -incentive spirometry    #HTN  -continue amlodipine  -continue enalapril/HCTZ  -monitor BP    #seizure disorder, last seizure 7 yrs ago  -continue keppra and depakote    #hypokalemia  -replete IV  -monitor labs    Thank you for your consult. Will follow with you. A/P  Pt is a 49 y/o M with lumbar radiculopathy s/p posterior lumbar fusion revision L4-S1    #s/p posterior lumbar fusion revision L4-S1, POD 0.  -plan as per ortho  -DVT as per anticoag services  -PT eval  -pain meds  -bowel regimen  -incentive spirometry    #HTN - controlled  -continue amlodipine  -continue enalapril/HCTZ  -monitor BP    #seizure disorder, last seizure 7 yrs ago  -continue keppra and depakote    #hypokalemia  -repleted  -monitor labs    Thank you for your consult. Will follow with you.

## 2019-07-22 NOTE — PROGRESS NOTE ADULT - SUBJECTIVE AND OBJECTIVE BOX
Orthopedic Post-op Check    POD 0 Posterior Lumbar Fusion   Pain controlled. Has expected surgical soreness. No Nausea or vomiting. Moving extremities actively.     Vital Signs Last 24 Hrs  T(C): 36.2 (22 Jul 2019 16:57), Max: 36.8 (22 Jul 2019 06:52)  T(F): 97.2 (22 Jul 2019 16:57), Max: 98.3 (22 Jul 2019 06:52)  HR: 93 (22 Jul 2019 16:57) (67 - 93)  BP: 140/82 (22 Jul 2019 16:57) (115/71 - 145/84)  BP(mean): --  RR: 18 (22 Jul 2019 16:57) (12 - 20)  SpO2: 98% (22 Jul 2019 16:57) (95% - 99%)                        14.3   8.67  )-----------( 256      ( 22 Jul 2019 13:23 )             43.1     07-22    140  |  105  |  12  ----------------------------<  120<H>  3.4<L>   |  26  |  1.15    Ca    8.7      22 Jul 2019 13:23        Exam:  NAD Alert and Awake  Dressing clean and dry  Drain intact    Lower Extremities  RLE TA/GS 5/5  LLE TA/GS 5/5  SILT digits 1-10  +DP pulses    A/P:  Stable POD 0 Lumbar Fusion  -Analgesia: PCA per Anesthesia  -Neurovascular checks  -DVT PE ppx  -PPX ABx 24h  -OOB PT  -[place] Collar  -Lumbar Support orthosis for comfort when OOB

## 2019-07-22 NOTE — CONSULT NOTE ADULT - SUBJECTIVE AND OBJECTIVE BOX
PCP: Dr. OSMAN Syed  Reason for admission: back pain    HPI- Pt is a 47 y/o M with lumbar radiculopathy. He underwent a lumbar fusion in 2018 and reports getting struck by falling object at work in 2017. He now complains of chronic low back pain radiating down b/l legs R>L. Pt ambulates using cane. Pt report a cancelled sx last month dt elevated BP. Today pt seen on medical floor s/p posterior lumbar fusion revision L4-S1, POD 0. Rates pain __/10 and denies n/v, SOB, and CP.    PAST MEDICAL & SURGICAL HISTORY:  HTN (hypertension)  Lumbar radiculopathy  Hypercholesteremia  Obesity  Chronic back pain  Chronic neck pain  Seizure: last seizure - about 7 yrs ago  History of fusion of cervical spine: 2018  S/P lumbar spinal fusion: 2018    Allergies: No Known Allergies    Home Medications:  amLODIPine 10 mg oral tablet: 1 tab(s) orally once a day (22 Jul 2019 06:52)  divalproex sodium 500 mg oral delayed release tablet: 2 tab(s) orally 2 times a day (22 Jul 2019 06:52)  enalapril-hydrochlorothiazide 10 mg-25 mg oral tablet: 1 tab(s) orally once a day (22 Jul 2019 06:52)  Keppra 500 mg oral tablet: 2 tab(s) orally 2 times a day (22 Jul 2019 06:52)  mupirocin 2% nasal ointment: 1 application nasal 2 times a day (22 Jul 2019 06:52)  Tylenol 500 mg oral tablet: 2 tab(s) orally every 6 hours (22 Jul 2019 06:52)    FH: no pertinent FH in first degree relatives    SH: denies tobacco or illicit drug use ever. Last ETOH drink 13 yrs ago (4x/week 10 or more drinks)    ROS:   All 10 systems reviewed and found to be negative with the exception of what has been described above.    Vital Signs Last 24 Hrs  T(C): 36.2 (22 Jul 2019 16:57), Max: 36.8 (22 Jul 2019 06:52)  T(F): 97.2 (22 Jul 2019 16:57), Max: 98.3 (22 Jul 2019 06:52)  HR: 93 (22 Jul 2019 16:57) (67 - 93)  BP: 140/82 (22 Jul 2019 16:57) (115/71 - 145/84)  BP(mean): --  RR: 18 (22 Jul 2019 16:57) (12 - 20)  SpO2: 98% (22 Jul 2019 16:57) (95% - 99%)    PE:                              14.3   8.67  )-----------( 256      ( 22 Jul 2019 13:23 )             43.1     07-22    140  |  105  |  12  ----------------------------<  120<H>  3.4<L>   |  26  |  1.15    Ca    8.7      22 Jul 2019 13:23 PCP: Dr. OSMAN Syed  Reason for admission: back pain    HPI- Pt is a 47 y/o M with lumbar radiculopathy. He underwent a lumbar fusion in 2018 and reports getting struck by falling object at work in 2017. He now complains of chronic low back pain radiating down b/l legs R>L. Pt ambulates using cane. Pt report a cancelled sx last month dt elevated BP. Today pt seen on medical floor s/p posterior lumbar fusion revision L4-S1, POD 0. pain well controlled on PCA.  denies n/v, SOB, and CP.    PAST MEDICAL & SURGICAL HISTORY:  HTN (hypertension)  Lumbar radiculopathy  Hypercholesteremia  Obesity  Chronic back pain  Chronic neck pain  Seizure: last seizure - about 7 yrs ago  History of fusion of cervical spine: 2018  S/P lumbar spinal fusion: 2018    Allergies: No Known Allergies    Home Medications:  amLODIPine 10 mg oral tablet: 1 tab(s) orally once a day (22 Jul 2019 06:52)  divalproex sodium 500 mg oral delayed release tablet: 2 tab(s) orally 2 times a day (22 Jul 2019 06:52)  enalapril-hydrochlorothiazide 10 mg-25 mg oral tablet: 1 tab(s) orally once a day (22 Jul 2019 06:52)  Keppra 500 mg oral tablet: 2 tab(s) orally 2 times a day (22 Jul 2019 06:52)  mupirocin 2% nasal ointment: 1 application nasal 2 times a day (22 Jul 2019 06:52)  Tylenol 500 mg oral tablet: 2 tab(s) orally every 6 hours (22 Jul 2019 06:52)    FH: no pertinent FH in first degree relatives    SH: denies tobacco or illicit drug use ever. Last ETOH drink 13 yrs ago (4x/week 10 or more drinks)    ROS:   All 10 systems reviewed and found to be negative with the exception of what has been described above.    Vital Signs Last 24 Hrs  T(C): 36.2 (22 Jul 2019 16:57), Max: 36.8 (22 Jul 2019 06:52)  T(F): 97.2 (22 Jul 2019 16:57), Max: 98.3 (22 Jul 2019 06:52)  HR: 93 (22 Jul 2019 16:57) (67 - 93)  BP: 140/82 (22 Jul 2019 16:57) (115/71 - 145/84)  BP(mean): --  RR: 18 (22 Jul 2019 16:57) (12 - 20)  SpO2: 98% (22 Jul 2019 16:57) (95% - 99%)    PE:                              14.3   8.67  )-----------( 256      ( 22 Jul 2019 13:23 )             43.1     07-22    140  |  105  |  12  ----------------------------<  120<H>  3.4<L>   |  26  |  1.15    Ca    8.7      22 Jul 2019 13:23

## 2019-07-22 NOTE — PROGRESS NOTE ADULT - SUBJECTIVE AND OBJECTIVE BOX
[facility]  Operative Report  Date of Surgery: 07/22/19  Patient: Bill House  Surgeon: Genet Parker DO – Orthopedic Surgery Attending  Assistant: Orthopedic Resident Dr. Aniceto Day  Dictation:   Preop Diagnosis:  Pseudoarthrosis, S1 screw loosening   Post op Diagnosis: Patient with solid fusion L4-S1 on the right side, patient with left sided pseudoarthrosis at L5-s1 with mild fusion on the left side at L4-L5  Procedure Summary:   Exploration of fusion L4-5-S1  Revision decompression L4-S1  Removal of deep spinal hardware L4-5-S1 to check for loosening and confirmation of fusion  Revision Posterolateral Fusion L4-5-S1  Placement of posterior lateral segmental instrumentation L4-5-S1  Bone allograft  Bone autograft   BMP  Flouroscopy  Allograft, BMP  Anesthesia: General Endotracheal Intubation  Positioning: Prone on Iam frame  Complication None:  Procedure in Detail:  Preoperative Consent was obtained.  Patient has severe back pain, symptoms have lately worsened in the last few months, work up had revealed loosening of instrumentation at the s1 level and therefore revision treatment was recommended.  Repeat MRI was reviewed showed lack of stenosis, loosening was confirmed on CT scan.    Risks and benefits were discussed.  all questions answered.  Extensive discussion regarding patient’s diagnosis, imaging, operative and non-operative options discussed with patient over multiple visits.  All questions are answered.  Informed consent in obtained.  No guarantees implied.  Goals are improvement in pain and quality of life.    Patient received general anesthesia.  Neuromonitoring devices were placed by neuromonitoring service.  Adams catheter was inserted. Patient was placed prone on the operative table.  All bony prominences were padded.  Neuromonitoring signals were confirmed.  Patient received preoperative antibiotics.      Patient’s lumbar spine was prepped and draped in sterile manner. Prior Midline incision were made and L4, L5 and S1 exposed. Scar tissue was elevated enmass from the L4 residual lamina and Sacrum exposing the think layer above the thecal sac.  No bone fragmens were noted compressing the thecal and lateral recess.    Next, facet joints and TP was exposed from L4, L5-S1.  On the right side there is solid cortical bone mass growing from sacral ala and attaching to the TP and pars from l4-S1,      On the left side however, the fusion mass is minimal.   All screws are removed and tested for loosening.  Left S1 is loose, Right S1 has minimal loosening, all others had solid purchase.     Next, I decided the role of interbody fusion, Since patient already has solid fusion on the right side, I did not feel the need of taking down the fusion mass that the body has created to performed TLIF.      Next,  I irrgiated both sides, removed all fibrous scar tissue, re-decorticated  the sacral ala on both sides, transverse, processes on left and right sided is covered with fusion mass, pars and the faceet joints.    Next, one size bigger screws are inserted in pedicles on right side and left side I inserted 2 size bigger pedicle screw at S1 and 1 size at L4 and L5 pedicle.    Instrumentation location confirmed with flouroscope.  Subsequently, BMP, autograft from the facets and partial laminotomy and decortication and allograft using corticocancellous chips were placed in the gutters.  Subsequently, nicolas was inserted and set screws placed.  All set screws were final tightened.  Final xrays were taken and I was satisfied with overall positioning of hardware and instrumentation.     Next, each incision was closed with number 1 vicryl for fascia, 0 vicryl for subcutaneous tissue and 2.0 vicryl.  Drain was inserted over the epidural space.   2gm of vancomycin powder was also placed in deep and superficial fascia.    Skin adhesive was applied.  Dry sterile dressing was applied.  Patient was transferred to supine position on regular bed.  Patient was extubated.  Patient had tolerated the procedure well.  Patient was moving b/l lower extremity    I personally spoke with patient's family at the end of the case.    Genet Parker DO  Orthopedic Spine Surgeon

## 2019-07-23 LAB
ANION GAP SERPL CALC-SCNC: 6 MMOL/L — SIGNIFICANT CHANGE UP (ref 5–17)
BASOPHILS # BLD AUTO: 0.01 K/UL — SIGNIFICANT CHANGE UP (ref 0–0.2)
BASOPHILS NFR BLD AUTO: 0.1 % — SIGNIFICANT CHANGE UP (ref 0–2)
BUN SERPL-MCNC: 15 MG/DL — SIGNIFICANT CHANGE UP (ref 7–23)
CALCIUM SERPL-MCNC: 8.6 MG/DL — SIGNIFICANT CHANGE UP (ref 8.5–10.1)
CHLORIDE SERPL-SCNC: 101 MMOL/L — SIGNIFICANT CHANGE UP (ref 96–108)
CO2 SERPL-SCNC: 29 MMOL/L — SIGNIFICANT CHANGE UP (ref 22–31)
CREAT SERPL-MCNC: 1.11 MG/DL — SIGNIFICANT CHANGE UP (ref 0.5–1.3)
EOSINOPHIL # BLD AUTO: 0 K/UL — SIGNIFICANT CHANGE UP (ref 0–0.5)
EOSINOPHIL NFR BLD AUTO: 0 % — SIGNIFICANT CHANGE UP (ref 0–6)
GLUCOSE SERPL-MCNC: 112 MG/DL — HIGH (ref 70–99)
HCT VFR BLD CALC: 39.1 % — SIGNIFICANT CHANGE UP (ref 39–50)
HGB BLD-MCNC: 13.3 G/DL — SIGNIFICANT CHANGE UP (ref 13–17)
IMM GRANULOCYTES NFR BLD AUTO: 0.6 % — SIGNIFICANT CHANGE UP (ref 0–1.5)
LYMPHOCYTES # BLD AUTO: 1.56 K/UL — SIGNIFICANT CHANGE UP (ref 1–3.3)
LYMPHOCYTES # BLD AUTO: 15.1 % — SIGNIFICANT CHANGE UP (ref 13–44)
MCHC RBC-ENTMCNC: 31.9 PG — SIGNIFICANT CHANGE UP (ref 27–34)
MCHC RBC-ENTMCNC: 34 GM/DL — SIGNIFICANT CHANGE UP (ref 32–36)
MCV RBC AUTO: 93.8 FL — SIGNIFICANT CHANGE UP (ref 80–100)
MONOCYTES # BLD AUTO: 0.99 K/UL — HIGH (ref 0–0.9)
MONOCYTES NFR BLD AUTO: 9.6 % — SIGNIFICANT CHANGE UP (ref 2–14)
NEUTROPHILS # BLD AUTO: 7.7 K/UL — HIGH (ref 1.8–7.4)
NEUTROPHILS NFR BLD AUTO: 74.6 % — SIGNIFICANT CHANGE UP (ref 43–77)
PLATELET # BLD AUTO: 257 K/UL — SIGNIFICANT CHANGE UP (ref 150–400)
POTASSIUM SERPL-MCNC: 4.5 MMOL/L — SIGNIFICANT CHANGE UP (ref 3.5–5.3)
POTASSIUM SERPL-SCNC: 4.5 MMOL/L — SIGNIFICANT CHANGE UP (ref 3.5–5.3)
RBC # BLD: 4.17 M/UL — LOW (ref 4.2–5.8)
RBC # FLD: 13.2 % — SIGNIFICANT CHANGE UP (ref 10.3–14.5)
SODIUM SERPL-SCNC: 136 MMOL/L — SIGNIFICANT CHANGE UP (ref 135–145)
WBC # BLD: 10.32 K/UL — SIGNIFICANT CHANGE UP (ref 3.8–10.5)
WBC # FLD AUTO: 10.32 K/UL — SIGNIFICANT CHANGE UP (ref 3.8–10.5)

## 2019-07-23 RX ORDER — HYDROMORPHONE HYDROCHLORIDE 2 MG/ML
0.5 INJECTION INTRAMUSCULAR; INTRAVENOUS; SUBCUTANEOUS EVERY 4 HOURS
Refills: 0 | Status: DISCONTINUED | OUTPATIENT
Start: 2019-07-23 | End: 2019-07-26

## 2019-07-23 RX ORDER — OXYCODONE HYDROCHLORIDE 5 MG/1
5 TABLET ORAL EVERY 4 HOURS
Refills: 0 | Status: DISCONTINUED | OUTPATIENT
Start: 2019-07-23 | End: 2019-07-26

## 2019-07-23 RX ORDER — OXYCODONE HYDROCHLORIDE 5 MG/1
10 TABLET ORAL EVERY 4 HOURS
Refills: 0 | Status: DISCONTINUED | OUTPATIENT
Start: 2019-07-23 | End: 2019-07-26

## 2019-07-23 RX ADMIN — DIVALPROEX SODIUM 1000 MILLIGRAM(S): 500 TABLET, DELAYED RELEASE ORAL at 06:32

## 2019-07-23 RX ADMIN — HYDROMORPHONE HYDROCHLORIDE 0.5 MILLIGRAM(S): 2 INJECTION INTRAMUSCULAR; INTRAVENOUS; SUBCUTANEOUS at 21:34

## 2019-07-23 RX ADMIN — HYDROMORPHONE HYDROCHLORIDE 0.5 MILLIGRAM(S): 2 INJECTION INTRAMUSCULAR; INTRAVENOUS; SUBCUTANEOUS at 21:49

## 2019-07-23 RX ADMIN — LEVETIRACETAM 1000 MILLIGRAM(S): 250 TABLET, FILM COATED ORAL at 17:54

## 2019-07-23 RX ADMIN — CYCLOBENZAPRINE HYDROCHLORIDE 10 MILLIGRAM(S): 10 TABLET, FILM COATED ORAL at 23:11

## 2019-07-23 RX ADMIN — Medication 100 MILLIGRAM(S): at 17:53

## 2019-07-23 RX ADMIN — Medication 100 MILLIGRAM(S): at 21:34

## 2019-07-23 RX ADMIN — OXYCODONE HYDROCHLORIDE 10 MILLIGRAM(S): 5 TABLET ORAL at 23:11

## 2019-07-23 RX ADMIN — Medication 100 MILLIGRAM(S): at 15:21

## 2019-07-23 RX ADMIN — Medication 100 MILLIGRAM(S): at 06:32

## 2019-07-23 RX ADMIN — Medication 1 TABLET(S): at 15:21

## 2019-07-23 RX ADMIN — Medication 1 TABLET(S): at 06:33

## 2019-07-23 RX ADMIN — Medication 100 MILLIGRAM(S): at 09:17

## 2019-07-23 RX ADMIN — Medication 1 TABLET(S): at 21:34

## 2019-07-23 RX ADMIN — Medication 100 MILLIGRAM(S): at 02:43

## 2019-07-23 RX ADMIN — PANTOPRAZOLE SODIUM 40 MILLIGRAM(S): 20 TABLET, DELAYED RELEASE ORAL at 06:32

## 2019-07-23 RX ADMIN — SENNA PLUS 2 TABLET(S): 8.6 TABLET ORAL at 21:34

## 2019-07-23 RX ADMIN — Medication 1 TABLET(S): at 11:40

## 2019-07-23 RX ADMIN — DIVALPROEX SODIUM 1000 MILLIGRAM(S): 500 TABLET, DELAYED RELEASE ORAL at 17:54

## 2019-07-23 RX ADMIN — LEVETIRACETAM 1000 MILLIGRAM(S): 250 TABLET, FILM COATED ORAL at 06:33

## 2019-07-23 NOTE — PHYSICAL THERAPY INITIAL EVALUATION ADULT - MODALITIES TREATMENT COMMENTS
Pt educated on spinal precautions, falls risk reduction, therex review and the overall impact on the pt's ADLs and safety.

## 2019-07-23 NOTE — PHYSICAL THERAPY INITIAL EVALUATION ADULT - GROSSLY INTACT, SENSORY
Pt with mild decreased light touch to BLEs, but states is has improved compared to the numbness he was experiencing prior to sx.

## 2019-07-23 NOTE — PROGRESS NOTE ADULT - SUBJECTIVE AND OBJECTIVE BOX
PCP: Dr. OSMAN Syed  Reason for admission: back pain    HPI- Pt is a 49 y/o M with lumbar radiculopathy. He underwent a lumbar fusion in 2018 and reports getting struck by falling object at work in 2017. He now complains of chronic low back pain radiating down b/l legs R>L. Pt ambulates using cane. Pt report a cancelled sx last month dt elevated BP.     7/23- Pt seen at bedside with wife in the room. Pt rates pain 7/10 with no SOB or CP.     ROS:   All 10 systems reviewed and found to be negative with the exception of what has been described above.    Vital Signs Last 24 Hrs  T(C): 36.7 (23 Jul 2019 11:13), Max: 36.7 (23 Jul 2019 03:12)  T(F): 98 (23 Jul 2019 11:13), Max: 98 (23 Jul 2019 03:12)  HR: 80 (23 Jul 2019 11:13) (80 - 93)  BP: 134/88 (23 Jul 2019 11:13) (102/66 - 140/82)  BP(mean): --  RR: 16 (23 Jul 2019 11:13) (16 - 20)  SpO2: 96% (23 Jul 2019 11:13) (95% - 98%)    GEN: lying in bed, NAD  HEENT:   NC/AT, pupils equal and reactive, EOMI  CV:  +S1, +S2, RRR  RESP:   lungs clear to auscultation bilaterally, no wheeze, rales, rhonchi   BREAST:  not examined  GI:  abdomen soft, non-tender, non-distended, normoactive BS  RECTAL:  not examined  :  not examined  MSK:   lumbar dressing c/d/i with drain in place  EXT:  no edema  NEURO:  AAOX3, no focal neurological deficits  SKIN:  no rashes                        13.3   10.32 )-----------( 257      ( 23 Jul 2019 07:24 )             39.1     07-23    136  |  101  |  15  ----------------------------<  112<H>  4.5   |  29  |  1.11    Ca    8.6      23 Jul 2019 07:24      A/P  Pt is a 49 y/o M with lumbar radiculopathy s/p posterior lumbar fusion revision L4-S1    #s/p posterior lumbar fusion revision L4-S1, POD 1  -plan as per ortho  -DVT as per anticoag services  -PT eval  -pain meds  -bowel regimen  -incentive spirometry    #HTN - controlled  -continue amlodipine  -continue enalapril/HCTZ  -monitor BP    #seizure disorder, last seizure 7 yrs ago  -continue keppra and depakote    #hypokalemia  -repleted and resolved  -monitor

## 2019-07-23 NOTE — PHYSICAL THERAPY INITIAL EVALUATION ADULT - PERTINENT HX OF CURRENT PROBLEM, REHAB EVAL
49 y/o M with lumbar radiculopathy. He underwent a lumbar fusion in 2018 and reports getting struck by falling object at work in 2017. He now complains of chronic low back pain radiating down b/l legs R>L. Pt ambulates using cane. Pt report a cancelled sx last month dt elevated BP. Today pt seen on medical floor s/p posterior lumbar fusion revision L4-S1

## 2019-07-23 NOTE — PHYSICAL THERAPY INITIAL EVALUATION ADULT - GENERAL OBSERVATIONS, REHAB EVAL
Pt rec'd supine in bed, wife present at bedside, endorses moderate post surgical incisional pain, using PCA, cooperative with PT, eager to get OOB

## 2019-07-23 NOTE — PROGRESS NOTE ADULT - SUBJECTIVE AND OBJECTIVE BOX
24 Hr events:  Pt seen and examined. Pain controlled. No acute events overnight. No Nausea or vomiting. Moving extremities actively.     Vital Signs Last 24 Hrs  T(C): 36.7 (23 Jul 2019 03:12), Max: 36.8 (22 Jul 2019 06:52)  T(F): 98 (23 Jul 2019 03:12), Max: 98.3 (22 Jul 2019 06:52)  HR: 87 (23 Jul 2019 03:12) (67 - 93)  BP: 126/78 (23 Jul 2019 03:12) (115/71 - 145/84)  BP(mean): --  RR: 18 (23 Jul 2019 03:12) (12 - 20)  SpO2: 95% (23 Jul 2019 03:12) (95% - 99%)    Exam:  NAD Alert and Awake  Dressing clean and dry  Drain intact    Lower Extremities  RLE TA/GS 5/5  LLE TA/GS 5/5  SILT digits 1-10  +DP pulses    A/P:  Stable POD 1 Lumbar Fusion:  -Analgesia: PCA per Anesthesia  -Adams  -HMV in place, monitor output  -Ancef while drains are in  -Neurovascular checks  -OOB PT  -[place] J Collar  -Lumbar Support orthosis for comfort when OOB

## 2019-07-24 LAB
ANION GAP SERPL CALC-SCNC: 6 MMOL/L — SIGNIFICANT CHANGE UP (ref 5–17)
BASOPHILS # BLD AUTO: 0.03 K/UL — SIGNIFICANT CHANGE UP (ref 0–0.2)
BASOPHILS NFR BLD AUTO: 0.3 % — SIGNIFICANT CHANGE UP (ref 0–2)
BUN SERPL-MCNC: 13 MG/DL — SIGNIFICANT CHANGE UP (ref 7–23)
CALCIUM SERPL-MCNC: 8.7 MG/DL — SIGNIFICANT CHANGE UP (ref 8.5–10.1)
CHLORIDE SERPL-SCNC: 101 MMOL/L — SIGNIFICANT CHANGE UP (ref 96–108)
CO2 SERPL-SCNC: 33 MMOL/L — HIGH (ref 22–31)
CREAT SERPL-MCNC: 0.9 MG/DL — SIGNIFICANT CHANGE UP (ref 0.5–1.3)
EOSINOPHIL # BLD AUTO: 0.27 K/UL — SIGNIFICANT CHANGE UP (ref 0–0.5)
EOSINOPHIL NFR BLD AUTO: 2.8 % — SIGNIFICANT CHANGE UP (ref 0–6)
GLUCOSE SERPL-MCNC: 82 MG/DL — SIGNIFICANT CHANGE UP (ref 70–99)
HCT VFR BLD CALC: 39.5 % — SIGNIFICANT CHANGE UP (ref 39–50)
HGB BLD-MCNC: 13.1 G/DL — SIGNIFICANT CHANGE UP (ref 13–17)
IMM GRANULOCYTES NFR BLD AUTO: 0.4 % — SIGNIFICANT CHANGE UP (ref 0–1.5)
LYMPHOCYTES # BLD AUTO: 3.07 K/UL — SIGNIFICANT CHANGE UP (ref 1–3.3)
LYMPHOCYTES # BLD AUTO: 32.3 % — SIGNIFICANT CHANGE UP (ref 13–44)
MCHC RBC-ENTMCNC: 31.6 PG — SIGNIFICANT CHANGE UP (ref 27–34)
MCHC RBC-ENTMCNC: 33.2 GM/DL — SIGNIFICANT CHANGE UP (ref 32–36)
MCV RBC AUTO: 95.4 FL — SIGNIFICANT CHANGE UP (ref 80–100)
MONOCYTES # BLD AUTO: 1.03 K/UL — HIGH (ref 0–0.9)
MONOCYTES NFR BLD AUTO: 10.8 % — SIGNIFICANT CHANGE UP (ref 2–14)
NEUTROPHILS # BLD AUTO: 5.06 K/UL — SIGNIFICANT CHANGE UP (ref 1.8–7.4)
NEUTROPHILS NFR BLD AUTO: 53.4 % — SIGNIFICANT CHANGE UP (ref 43–77)
PLATELET # BLD AUTO: 229 K/UL — SIGNIFICANT CHANGE UP (ref 150–400)
POTASSIUM SERPL-MCNC: 4 MMOL/L — SIGNIFICANT CHANGE UP (ref 3.5–5.3)
POTASSIUM SERPL-SCNC: 4 MMOL/L — SIGNIFICANT CHANGE UP (ref 3.5–5.3)
RBC # BLD: 4.14 M/UL — LOW (ref 4.2–5.8)
RBC # FLD: 13.5 % — SIGNIFICANT CHANGE UP (ref 10.3–14.5)
SODIUM SERPL-SCNC: 140 MMOL/L — SIGNIFICANT CHANGE UP (ref 135–145)
WBC # BLD: 9.5 K/UL — SIGNIFICANT CHANGE UP (ref 3.8–10.5)
WBC # FLD AUTO: 9.5 K/UL — SIGNIFICANT CHANGE UP (ref 3.8–10.5)

## 2019-07-24 PROCEDURE — 72100 X-RAY EXAM L-S SPINE 2/3 VWS: CPT | Mod: 26

## 2019-07-24 RX ADMIN — Medication 100 MILLIGRAM(S): at 18:22

## 2019-07-24 RX ADMIN — CYCLOBENZAPRINE HYDROCHLORIDE 10 MILLIGRAM(S): 10 TABLET, FILM COATED ORAL at 15:23

## 2019-07-24 RX ADMIN — OXYCODONE HYDROCHLORIDE 10 MILLIGRAM(S): 5 TABLET ORAL at 00:09

## 2019-07-24 RX ADMIN — LEVETIRACETAM 1000 MILLIGRAM(S): 250 TABLET, FILM COATED ORAL at 18:22

## 2019-07-24 RX ADMIN — OXYCODONE HYDROCHLORIDE 10 MILLIGRAM(S): 5 TABLET ORAL at 16:15

## 2019-07-24 RX ADMIN — Medication 100 MILLIGRAM(S): at 09:47

## 2019-07-24 RX ADMIN — Medication 25 MILLIGRAM(S): at 05:50

## 2019-07-24 RX ADMIN — AMLODIPINE BESYLATE 10 MILLIGRAM(S): 2.5 TABLET ORAL at 05:51

## 2019-07-24 RX ADMIN — OXYCODONE HYDROCHLORIDE 10 MILLIGRAM(S): 5 TABLET ORAL at 09:45

## 2019-07-24 RX ADMIN — Medication 1 TABLET(S): at 15:23

## 2019-07-24 RX ADMIN — Medication 1 TABLET(S): at 21:50

## 2019-07-24 RX ADMIN — Medication 100 MILLIGRAM(S): at 15:23

## 2019-07-24 RX ADMIN — DIVALPROEX SODIUM 1000 MILLIGRAM(S): 500 TABLET, DELAYED RELEASE ORAL at 18:22

## 2019-07-24 RX ADMIN — Medication 1 TABLET(S): at 05:51

## 2019-07-24 RX ADMIN — Medication 100 MILLIGRAM(S): at 05:51

## 2019-07-24 RX ADMIN — Medication 100 MILLIGRAM(S): at 01:24

## 2019-07-24 RX ADMIN — LEVETIRACETAM 1000 MILLIGRAM(S): 250 TABLET, FILM COATED ORAL at 05:52

## 2019-07-24 RX ADMIN — OXYCODONE HYDROCHLORIDE 10 MILLIGRAM(S): 5 TABLET ORAL at 15:23

## 2019-07-24 RX ADMIN — DIVALPROEX SODIUM 1000 MILLIGRAM(S): 500 TABLET, DELAYED RELEASE ORAL at 05:52

## 2019-07-24 RX ADMIN — SENNA PLUS 2 TABLET(S): 8.6 TABLET ORAL at 21:50

## 2019-07-24 RX ADMIN — Medication 100 MILLIGRAM(S): at 21:50

## 2019-07-24 RX ADMIN — HYDROMORPHONE HYDROCHLORIDE 0.5 MILLIGRAM(S): 2 INJECTION INTRAMUSCULAR; INTRAVENOUS; SUBCUTANEOUS at 08:32

## 2019-07-24 RX ADMIN — Medication 10 MILLIGRAM(S): at 05:51

## 2019-07-24 RX ADMIN — HYDROMORPHONE HYDROCHLORIDE 0.5 MILLIGRAM(S): 2 INJECTION INTRAMUSCULAR; INTRAVENOUS; SUBCUTANEOUS at 08:44

## 2019-07-24 RX ADMIN — HYDROMORPHONE HYDROCHLORIDE 0.5 MILLIGRAM(S): 2 INJECTION INTRAMUSCULAR; INTRAVENOUS; SUBCUTANEOUS at 04:55

## 2019-07-24 RX ADMIN — PANTOPRAZOLE SODIUM 40 MILLIGRAM(S): 20 TABLET, DELAYED RELEASE ORAL at 05:51

## 2019-07-24 RX ADMIN — OXYCODONE HYDROCHLORIDE 10 MILLIGRAM(S): 5 TABLET ORAL at 10:40

## 2019-07-24 RX ADMIN — HYDROMORPHONE HYDROCHLORIDE 0.5 MILLIGRAM(S): 2 INJECTION INTRAMUSCULAR; INTRAVENOUS; SUBCUTANEOUS at 04:40

## 2019-07-24 NOTE — PROGRESS NOTE ADULT - ASSESSMENT
A/P: 48M s/p revision PSF L4-S1  Monitor/record hemovac output  Ancef while drain in place  Analgesia  DVT ppx - scds  WBAT  LSO brace at bedside  FU AM Xrays  PT  DC planning

## 2019-07-24 NOTE — PROGRESS NOTE ADULT - SUBJECTIVE AND OBJECTIVE BOX
PCP: Dr. OSMAN Syed  Reason for admission: back pain    HPI- Pt is a 49 y/o M with lumbar radiculopathy. He underwent a lumbar fusion in 2018 and reports getting struck by falling object at work in 2017. He now complains of chronic low back pain radiating down b/l legs R>L. Pt ambulates using cane. Pt report a cancelled sx last month dt elevated BP.     7/23- Pt seen at bedside with wife in the room. Pt rates pain 7/10 with no SOB or CP.   7/24- Pt rates pain 8/10, worse with movement. Received weight bearing x-ray this AM.     ROS:   All 10 systems reviewed and found to be negative with the exception of what has been described above.    Vital Signs Last 24 Hrs  T(C): 36.8 (24 Jul 2019 11:18), Max: 36.9 (23 Jul 2019 16:33)  T(F): 98.2 (24 Jul 2019 11:18), Max: 98.4 (23 Jul 2019 16:33)  HR: 84 (24 Jul 2019 11:18) (65 - 84)  BP: 111/77 (24 Jul 2019 11:18) (111/77 - 132/83)  BP(mean): --  RR: 16 (24 Jul 2019 11:18) (16 - 16)  SpO2: 100% (24 Jul 2019 11:18) (97% - 100%)    GEN: lying in bed, NAD  HEENT:   NC/AT, pupils equal and reactive, EOMI  CV:  +S1, +S2, RRR  RESP:   lungs clear to auscultation bilaterally, no wheeze, rales, rhonchi   BREAST:  not examined  GI:  abdomen soft, non-tender, non-distended, normoactive BS  RECTAL:  not examined  :  not examined  MSK:   lumbar dressing c/d/i with drain in place  EXT:  no edema  NEURO:  AAOX3, no focal neurological deficits  SKIN:  no rashes                        13.1   9.50  )-----------( 229      ( 24 Jul 2019 06:16 )             39.5     07-24    140  |  101  |  13  ----------------------------<  82  4.0   |  33<H>  |  0.90    Ca    8.7      24 Jul 2019 06:16    A/P  Pt is a 49 y/o M with lumbar radiculopathy s/p posterior lumbar fusion revision L4-S1    #s/p posterior lumbar fusion revision L4-S1, POD 2  -plan as per ortho  -DVT as per anticoag services  -PT eval  -pain meds  -bowel regimen  -incentive spirometry    #HTN - controlled  -continue amlodipine  -continue enalapril/HCTZ  -monitor BP    #seizure disorder, last seizure 7 yrs ago  -continue keppra and depakote    #hypokalemia  -repleted and resolved  -monitor PCP: Dr. OSMAN Syed  Reason for admission: back pain    HPI- Pt is a 49 y/o M with lumbar radiculopathy. He underwent a lumbar fusion in 2018 and reports getting struck by falling object at work in 2017. He now complains of chronic low back pain radiating down b/l legs R>L. Pt ambulates using cane. Pt report a cancelled sx last month dt elevated BP.     7/23- Pt seen at bedside with wife in the room. Pt rates pain 7/10 with no SOB or CP.   7/24- Pt rates pain 8/10, worse with movement. Received weight bearing x-ray this AM.     ROS:   All 10 systems reviewed and found to be negative with the exception of what has been described above.    Vital Signs Last 24 Hrs  T(C): 36.8 (24 Jul 2019 11:18), Max: 36.9 (23 Jul 2019 16:33)  T(F): 98.2 (24 Jul 2019 11:18), Max: 98.4 (23 Jul 2019 16:33)  HR: 84 (24 Jul 2019 11:18) (65 - 84)  BP: 111/77 (24 Jul 2019 11:18) (111/77 - 132/83)  BP(mean): --  RR: 16 (24 Jul 2019 11:18) (16 - 16)  SpO2: 100% (24 Jul 2019 11:18) (97% - 100%)    GEN: lying in bed, NAD  HEENT:   NC/AT, pupils equal and reactive, EOMI  CV:  +S1, +S2, RRR  RESP:   lungs clear to auscultation bilaterally, no wheeze, rales, rhonchi   BREAST:  not examined  GI:  abdomen soft, non-tender, non-distended, normoactive BS  RECTAL:  not examined  :  not examined  MSK:   lumbar dressing c/d/i with drain in place  EXT:  no edema  NEURO:  AAOX3, no focal neurological deficits  SKIN:  no rashes                        13.1   9.50  )-----------( 229      ( 24 Jul 2019 06:16 )             39.5     07-24    140  |  101  |  13  ----------------------------<  82  4.0   |  33<H>  |  0.90    Ca    8.7      24 Jul 2019 06:16    < from: Xray Lumbosacral Spine (07.24.19 @ 09:12) >  IMPRESSION:     Status post L4-S1 posterior fusion with orthopedic hardware in place.  Moderate multilevel degenerative arthritic changes as described.    < end of copied text >    A/P  Pt is a 49 y/o M with lumbar radiculopathy s/p posterior lumbar fusion revision L4-S1    #s/p posterior lumbar fusion revision L4-S1, POD 2  -plan as per ortho - plan to remove RAYMON tomorrow if output decreased and possible dc home tomorrow  -DVT as per anticoag services  -PT eval  -pain meds  -bowel regimen  -incentive spirometry    #HTN - controlled  -continue amlodipine  -continue enalapril/HCTZ  -monitor BP    #seizure disorder, last seizure 7 yrs ago  -continue keppra and depakote    #hypokalemia  -repleted and resolved  -monitor

## 2019-07-24 NOTE — PROGRESS NOTE ADULT - SUBJECTIVE AND OBJECTIVE BOX
Pt S/E at bedside, no acute events overnight, pain controlled    Vital Signs Last 24 Hrs  T(C): 36.7 (24 Jul 2019 03:10), Max: 36.9 (23 Jul 2019 16:33)  T(F): 98 (24 Jul 2019 03:10), Max: 98.4 (23 Jul 2019 16:33)  HR: 65 (24 Jul 2019 03:10) (65 - 80)  BP: 122/80 (24 Jul 2019 03:10) (122/80 - 134/88)  BP(mean): --  RR: 16 (24 Jul 2019 03:10) (16 - 16)  SpO2: 97% (24 Jul 2019 03:10) (96% - 98%)    Gen: NAD    Spine:  Dressing clean dry intact, +drain  +EHL/FHL/TA/GS  +AIN/PIN/M/R/U/Msc/Ax  SILT L3-S1  SILT C5-T1  +DP/PT Pulses  +Radial Pulse  Compartments soft  No calf TTP B/L

## 2019-07-25 ENCOUNTER — TRANSCRIPTION ENCOUNTER (OUTPATIENT)
Age: 49
End: 2019-07-25

## 2019-07-25 LAB
ANION GAP SERPL CALC-SCNC: 5 MMOL/L — SIGNIFICANT CHANGE UP (ref 5–17)
BASOPHILS # BLD AUTO: 0.03 K/UL — SIGNIFICANT CHANGE UP (ref 0–0.2)
BASOPHILS NFR BLD AUTO: 0.3 % — SIGNIFICANT CHANGE UP (ref 0–2)
BUN SERPL-MCNC: 16 MG/DL — SIGNIFICANT CHANGE UP (ref 7–23)
CALCIUM SERPL-MCNC: 9 MG/DL — SIGNIFICANT CHANGE UP (ref 8.5–10.1)
CHLORIDE SERPL-SCNC: 99 MMOL/L — SIGNIFICANT CHANGE UP (ref 96–108)
CO2 SERPL-SCNC: 32 MMOL/L — HIGH (ref 22–31)
CREAT SERPL-MCNC: 0.99 MG/DL — SIGNIFICANT CHANGE UP (ref 0.5–1.3)
EOSINOPHIL # BLD AUTO: 0.58 K/UL — HIGH (ref 0–0.5)
EOSINOPHIL NFR BLD AUTO: 6.5 % — HIGH (ref 0–6)
GLUCOSE SERPL-MCNC: 98 MG/DL — SIGNIFICANT CHANGE UP (ref 70–99)
HCT VFR BLD CALC: 36.8 % — LOW (ref 39–50)
HGB BLD-MCNC: 12.2 G/DL — LOW (ref 13–17)
IMM GRANULOCYTES NFR BLD AUTO: 0.4 % — SIGNIFICANT CHANGE UP (ref 0–1.5)
LYMPHOCYTES # BLD AUTO: 3.92 K/UL — HIGH (ref 1–3.3)
LYMPHOCYTES # BLD AUTO: 43.8 % — SIGNIFICANT CHANGE UP (ref 13–44)
MCHC RBC-ENTMCNC: 31.8 PG — SIGNIFICANT CHANGE UP (ref 27–34)
MCHC RBC-ENTMCNC: 33.2 GM/DL — SIGNIFICANT CHANGE UP (ref 32–36)
MCV RBC AUTO: 95.8 FL — SIGNIFICANT CHANGE UP (ref 80–100)
MONOCYTES # BLD AUTO: 1.02 K/UL — HIGH (ref 0–0.9)
MONOCYTES NFR BLD AUTO: 11.4 % — SIGNIFICANT CHANGE UP (ref 2–14)
NEUTROPHILS # BLD AUTO: 3.36 K/UL — SIGNIFICANT CHANGE UP (ref 1.8–7.4)
NEUTROPHILS NFR BLD AUTO: 37.6 % — LOW (ref 43–77)
PLATELET # BLD AUTO: 213 K/UL — SIGNIFICANT CHANGE UP (ref 150–400)
POTASSIUM SERPL-MCNC: 3.6 MMOL/L — SIGNIFICANT CHANGE UP (ref 3.5–5.3)
POTASSIUM SERPL-SCNC: 3.6 MMOL/L — SIGNIFICANT CHANGE UP (ref 3.5–5.3)
RBC # BLD: 3.84 M/UL — LOW (ref 4.2–5.8)
RBC # FLD: 13.3 % — SIGNIFICANT CHANGE UP (ref 10.3–14.5)
SODIUM SERPL-SCNC: 136 MMOL/L — SIGNIFICANT CHANGE UP (ref 135–145)
WBC # BLD: 8.95 K/UL — SIGNIFICANT CHANGE UP (ref 3.8–10.5)
WBC # FLD AUTO: 8.95 K/UL — SIGNIFICANT CHANGE UP (ref 3.8–10.5)

## 2019-07-25 RX ORDER — MUPIROCIN 20 MG/G
1 OINTMENT TOPICAL
Qty: 0 | Refills: 0 | DISCHARGE

## 2019-07-25 RX ORDER — OXYCODONE HYDROCHLORIDE 5 MG/1
1 TABLET ORAL
Qty: 28 | Refills: 0
Start: 2019-07-25 | End: 2019-07-31

## 2019-07-25 RX ADMIN — HYDROMORPHONE HYDROCHLORIDE 0.5 MILLIGRAM(S): 2 INJECTION INTRAMUSCULAR; INTRAVENOUS; SUBCUTANEOUS at 14:57

## 2019-07-25 RX ADMIN — OXYCODONE HYDROCHLORIDE 10 MILLIGRAM(S): 5 TABLET ORAL at 12:37

## 2019-07-25 RX ADMIN — LEVETIRACETAM 1000 MILLIGRAM(S): 250 TABLET, FILM COATED ORAL at 17:23

## 2019-07-25 RX ADMIN — Medication 10 MILLIGRAM(S): at 05:50

## 2019-07-25 RX ADMIN — LEVETIRACETAM 1000 MILLIGRAM(S): 250 TABLET, FILM COATED ORAL at 05:48

## 2019-07-25 RX ADMIN — Medication 1 TABLET(S): at 12:07

## 2019-07-25 RX ADMIN — Medication 1 TABLET(S): at 21:45

## 2019-07-25 RX ADMIN — OXYCODONE HYDROCHLORIDE 10 MILLIGRAM(S): 5 TABLET ORAL at 20:02

## 2019-07-25 RX ADMIN — Medication 100 MILLIGRAM(S): at 17:22

## 2019-07-25 RX ADMIN — AMLODIPINE BESYLATE 10 MILLIGRAM(S): 2.5 TABLET ORAL at 05:48

## 2019-07-25 RX ADMIN — Medication 12.5 MILLIGRAM(S): at 21:44

## 2019-07-25 RX ADMIN — Medication 1 TABLET(S): at 15:01

## 2019-07-25 RX ADMIN — HYDROMORPHONE HYDROCHLORIDE 0.5 MILLIGRAM(S): 2 INJECTION INTRAMUSCULAR; INTRAVENOUS; SUBCUTANEOUS at 15:12

## 2019-07-25 RX ADMIN — Medication 100 MILLIGRAM(S): at 09:38

## 2019-07-25 RX ADMIN — DIVALPROEX SODIUM 1000 MILLIGRAM(S): 500 TABLET, DELAYED RELEASE ORAL at 05:48

## 2019-07-25 RX ADMIN — DIVALPROEX SODIUM 1000 MILLIGRAM(S): 500 TABLET, DELAYED RELEASE ORAL at 17:22

## 2019-07-25 RX ADMIN — Medication 100 MILLIGRAM(S): at 01:07

## 2019-07-25 RX ADMIN — Medication 100 MILLIGRAM(S): at 15:01

## 2019-07-25 RX ADMIN — Medication 1 TABLET(S): at 05:50

## 2019-07-25 RX ADMIN — Medication 100 MILLIGRAM(S): at 05:49

## 2019-07-25 RX ADMIN — Medication 25 MILLIGRAM(S): at 05:50

## 2019-07-25 RX ADMIN — PANTOPRAZOLE SODIUM 40 MILLIGRAM(S): 20 TABLET, DELAYED RELEASE ORAL at 05:49

## 2019-07-25 RX ADMIN — Medication 100 MILLIGRAM(S): at 21:46

## 2019-07-25 RX ADMIN — OXYCODONE HYDROCHLORIDE 10 MILLIGRAM(S): 5 TABLET ORAL at 12:07

## 2019-07-25 RX ADMIN — SENNA PLUS 2 TABLET(S): 8.6 TABLET ORAL at 21:46

## 2019-07-25 NOTE — DISCHARGE NOTE PROVIDER - NSDCCPCAREPLAN_GEN_ALL_CORE_FT
PRINCIPAL DISCHARGE DIAGNOSIS  Diagnosis: Lumbar radiculopathy, acute  Assessment and Plan of Treatment:

## 2019-07-25 NOTE — PROGRESS NOTE ADULT - SUBJECTIVE AND OBJECTIVE BOX
Pt S/E at bedside, no acute events overnight, pain controlled    Vital Signs Last 24 Hrs  T(C): 36.9 (24 Jul 2019 17:14), Max: 36.9 (24 Jul 2019 08:32)  T(F): 98.4 (24 Jul 2019 17:14), Max: 98.4 (24 Jul 2019 08:32)  HR: 73 (24 Jul 2019 17:14) (73 - 84)  BP: 113/68 (24 Jul 2019 17:14) (111/77 - 132/83)  BP(mean): --  RR: 16 (24 Jul 2019 17:14) (16 - 16)  SpO2: 97% (24 Jul 2019 17:14) (97% - 100%)    Gen: NAD    Spine:  Dressing clean dry intact, +JPdrain  +EHL/FHL/TA/GS  +AIN/PIN/M/R/U/Msc/Ax  SILT L3-S1  SILT C5-T1  +DP/PT Pulses  +Radial Pulse  Compartments soft  No calf TTP B/L

## 2019-07-25 NOTE — DISCHARGE NOTE PROVIDER - CARE PROVIDER_API CALL
Genet Parker (DO)  Orthopaedic Surgery Orthopaedics Surgery  [address], [place] [address]  Phone: [phone]  Fax: [phone]  Follow Up Time:

## 2019-07-25 NOTE — DISCHARGE NOTE PROVIDER - HOSPITAL COURSE
Patient presented to [facility] after being medically cleared for an elective surgical procedure, having failed outpatient non-operative conservative management. Prophylactic antibiotics were started before the procedure and continued for drains. They were admitted after surgery to the orthopedic floor.   There were no complications during the hospital stay. All home medications were continued.         Routine consults were obtained from Physical Therapy for twice daily PT starting on POD 0, and from the Hospitalist for Medical Co-management. Patient was placed on SCDsfor anticoagulation.  Pertinent home medications were continued.  Daily labs were followed.          On POD 0 the pt was OOB with PT and there were no overnight events. POD 2, PT was continued, and on POD 3 a new Aquacel dressing was applied. The pt is ready today for DC to home with home PT.  The orthopedic Attending is aware and agrees.            1. Pain Control    2. Walking with full weight bearing as tolerated, with assistive devices (walker/cane) as needed    3. PT as needed    4. Follow up with Dr. Parker as Outpatient in 7-10 days after discharge from the hospital or rehab. Call office for appointment.    5. Keep dressing clean and dry.      6. No bath/hot tubs or soaks    7. ***Abx while drains in place. Patient presented to [facility] after being medically cleared for an elective surgical procedure, having failed outpatient non-operative conservative management. Prophylactic antibiotics were started before the procedure and continued for drains. They were admitted after surgery to the orthopedic floor.   There were no complications during the hospital stay. All home medications were continued.         Routine consults were obtained from Physical Therapy for twice daily PT starting on POD 0, and from the Hospitalist for Medical Co-management. Patient was placed on SCDsfor anticoagulation.  Pertinent home medications were continued.  Daily labs were followed.          On POD 0 the pt was OOB with PT and there were no overnight events. Drain was pulled POD 1 prior to DC. The pt is ready today for DC to home with home PT.  The orthopedic Attending is aware and agrees.            1. Pain Control    2. Walking with full weight bearing as tolerated, with assistive devices (walker/cane) as needed    3. PT as needed    4. Follow up with Dr. Parker as Outpatient in 7-10 days after discharge from the hospital or rehab. Call office for appointment.    5. Keep dressing clean and dry.      6. No bath/hot tubs or soaks

## 2019-07-25 NOTE — PROGRESS NOTE ADULT - SUBJECTIVE AND OBJECTIVE BOX
PCP: Dr. OSMAN Syed    c/c: back pain    HPI- Pt is a 47 y/o M with lumbar radiculopathy. He underwent a lumbar fusion in 2018 and reports getting struck by falling object at work in 2017. He now complains of chronic low back pain radiating down b/l legs R>L. Pt ambulates using cane. Pt report a cancelled sx last month dt elevated BP. He was admitted for posterior lumbar fusion revision. hospitalist service consulted for medical comanagement.     ROS:   All 10 systems reviewed and found to be negative with the exception of what has been described above.    Vital Signs Last 24 Hrs  T(C): 37.1 (25 Jul 2019 12:00), Max: 37.2 (25 Jul 2019 05:11)  T(F): 98.7 (25 Jul 2019 12:00), Max: 98.9 (25 Jul 2019 05:11)  HR: 83 (25 Jul 2019 12:00) (73 - 83)  BP: 123/79 (25 Jul 2019 12:00) (113/68 - 129/81)  RR: 16 (25 Jul 2019 12:00) (16 - 16)  SpO2: 98% (25 Jul 2019 12:00) (97% - 98%)    PHYSICAL EXAM:    GENERAL: Comfortable, no acute distress   HEAD:  Normocephalic, atraumatic  EYES: EOMI, PERRLA  HEENT: Moist mucous membranes  NECK: Supple, No JVD  NERVOUS SYSTEM:  Alert & Oriented X3, non focal  CHEST/LUNG: Clear to auscultation bilaterally in upper lung fields, brace in place.  HEART: Regular rate and rhythm  ABDOMEN: Soft, Nontender, Nondistended, Bowel sounds present  GENITOURINARY: Voiding, no palpable bladder  EXTREMITIES:   No clubbing, cyanosis, or edema  MUSCULOSKELETAL- +Hemovac present, back dressing intact.  SKIN-no rash        LABS:                        12.2   8.95  )-----------( 213      ( 25 Jul 2019 06:51 )             36.8     07-25    136  |  99  |  16  ----------------------------<  98  3.6   |  32<H>  |  0.99    Ca    9.0      25 Jul 2019 06:51        < from: Xray Lumbosacral Spine (07.24.19 @ 09:12) >  IMPRESSION:     Status post L4-S1 posterior fusion with orthopedic hardware in place.  Moderate multilevel degenerative arthritic changes as described.    MEDICATIONS  (STANDING):  amLODIPine   Tablet 10 milliGRAM(s) Oral daily  calcium carbonate 1250 mG  + Vitamin D (OsCal 500 + D) 1 Tablet(s) Oral three times a day  ceFAZolin   IVPB 2000 milliGRAM(s) IV Intermittent every 8 hours  diVALproex DR 1000 milliGRAM(s) Oral two times a day  docusate sodium 100 milliGRAM(s) Oral three times a day  enalapril 10 milliGRAM(s) Oral daily  hydrochlorothiazide 25 milliGRAM(s) Oral daily  lactated ringers. 1000 milliLiter(s) (75 mL/Hr) IV Continuous <Continuous>  levETIRAcetam 1000 milliGRAM(s) Oral two times a day  multivitamin 1 Tablet(s) Oral daily  pantoprazole    Tablet 40 milliGRAM(s) Oral before breakfast  senna 2 Tablet(s) Oral at bedtime    MEDICATIONS  (PRN):  acetaminophen   Tablet .. 650 milliGRAM(s) Oral every 6 hours PRN Temp greater or equal to 38C (100.4F)  cyclobenzaprine 10 milliGRAM(s) Oral every 8 hours PRN muscle spasms  diphenhydrAMINE   Injectable 12.5 milliGRAM(s) IV Push every 4 hours PRN Itching  HYDROmorphone  Injectable 0.5 milliGRAM(s) IV Push every 4 hours PRN Severe Pain (7 - 10)  magnesium hydroxide Suspension 30 milliLiter(s) Oral every 12 hours PRN Constipation  naloxone Injectable 0.1 milliGRAM(s) IV Push every 3 minutes PRN For ANY of the following changes in patient status:  A. RR LESS THAN 10 breaths per minute, B. Oxygen saturation LESS THAN 90%, C. Sedation score of 6  ondansetron Injectable 4 milliGRAM(s) IV Push every 6 hours PRN Nausea  oxyCODONE    IR 5 milliGRAM(s) Oral every 4 hours PRN Mild Pain (1 - 3)  oxyCODONE    IR 10 milliGRAM(s) Oral every 4 hours PRN Moderate Pain (4 - 6)    ASSESSMENT AND PLAN:  Pt is a 47 y/o M with lumbar radiculopathy s/p posterior lumbar fusion revision L4-S1    #s/p posterior lumbar fusion revision L4-S1, POD 3  -Monitor hemovac output  -physical therapy  -bowel regimen  -incentive spirometry    #HTN - controlled  -continue amlodipine  -continue enalapril/HCTZ  -monitor BP    #seizure disorder, last seizure 7 yrs ago  -continue keppra and depakote    #hypokalemia  -repleted and resolved  -monitor     #DVT px

## 2019-07-25 NOTE — PROGRESS NOTE ADULT - ASSESSMENT
A/P: 48M s/p revision PSF L4-S1    Monitor/record hemovac output  Ancef while drain in place  Analgesia  DVT ppx - scds  WBAT  LSO brace at bedside  FU AM labs  Incentive spirometer.   PT  DC planning, Possible DC Home today pending am PT eval.   Will DW attending and advise of any changes.

## 2019-07-26 ENCOUNTER — TRANSCRIPTION ENCOUNTER (OUTPATIENT)
Age: 49
End: 2019-07-26

## 2019-07-26 VITALS
SYSTOLIC BLOOD PRESSURE: 126 MMHG | RESPIRATION RATE: 18 BRPM | HEART RATE: 72 BPM | DIASTOLIC BLOOD PRESSURE: 75 MMHG | TEMPERATURE: 98 F | OXYGEN SATURATION: 98 %

## 2019-07-26 LAB
ANION GAP SERPL CALC-SCNC: 3 MMOL/L — LOW (ref 5–17)
BUN SERPL-MCNC: 14 MG/DL — SIGNIFICANT CHANGE UP (ref 7–23)
CALCIUM SERPL-MCNC: 8.7 MG/DL — SIGNIFICANT CHANGE UP (ref 8.5–10.1)
CHLORIDE SERPL-SCNC: 96 MMOL/L — SIGNIFICANT CHANGE UP (ref 96–108)
CO2 SERPL-SCNC: 37 MMOL/L — HIGH (ref 22–31)
CREAT SERPL-MCNC: 0.98 MG/DL — SIGNIFICANT CHANGE UP (ref 0.5–1.3)
GLUCOSE SERPL-MCNC: 93 MG/DL — SIGNIFICANT CHANGE UP (ref 70–99)
HCT VFR BLD CALC: 37.4 % — LOW (ref 39–50)
HGB BLD-MCNC: 12.6 G/DL — LOW (ref 13–17)
MCHC RBC-ENTMCNC: 31.6 PG — SIGNIFICANT CHANGE UP (ref 27–34)
MCHC RBC-ENTMCNC: 33.7 GM/DL — SIGNIFICANT CHANGE UP (ref 32–36)
MCV RBC AUTO: 93.7 FL — SIGNIFICANT CHANGE UP (ref 80–100)
PLATELET # BLD AUTO: 238 K/UL — SIGNIFICANT CHANGE UP (ref 150–400)
POTASSIUM SERPL-MCNC: 3.8 MMOL/L — SIGNIFICANT CHANGE UP (ref 3.5–5.3)
POTASSIUM SERPL-SCNC: 3.8 MMOL/L — SIGNIFICANT CHANGE UP (ref 3.5–5.3)
RBC # BLD: 3.99 M/UL — LOW (ref 4.2–5.8)
RBC # FLD: 12.8 % — SIGNIFICANT CHANGE UP (ref 10.3–14.5)
SODIUM SERPL-SCNC: 136 MMOL/L — SIGNIFICANT CHANGE UP (ref 135–145)
WBC # BLD: 9.5 K/UL — SIGNIFICANT CHANGE UP (ref 3.8–10.5)
WBC # FLD AUTO: 9.5 K/UL — SIGNIFICANT CHANGE UP (ref 3.8–10.5)

## 2019-07-26 RX ADMIN — OXYCODONE HYDROCHLORIDE 10 MILLIGRAM(S): 5 TABLET ORAL at 07:00

## 2019-07-26 RX ADMIN — DIVALPROEX SODIUM 1000 MILLIGRAM(S): 500 TABLET, DELAYED RELEASE ORAL at 06:00

## 2019-07-26 RX ADMIN — Medication 1 TABLET(S): at 12:30

## 2019-07-26 RX ADMIN — Medication 1 TABLET(S): at 06:00

## 2019-07-26 RX ADMIN — MAGNESIUM HYDROXIDE 30 MILLILITER(S): 400 TABLET, CHEWABLE ORAL at 12:33

## 2019-07-26 RX ADMIN — Medication 100 MILLIGRAM(S): at 06:01

## 2019-07-26 RX ADMIN — Medication 25 MILLIGRAM(S): at 06:01

## 2019-07-26 RX ADMIN — AMLODIPINE BESYLATE 10 MILLIGRAM(S): 2.5 TABLET ORAL at 06:01

## 2019-07-26 RX ADMIN — LEVETIRACETAM 1000 MILLIGRAM(S): 250 TABLET, FILM COATED ORAL at 06:00

## 2019-07-26 RX ADMIN — Medication 100 MILLIGRAM(S): at 00:59

## 2019-07-26 RX ADMIN — OXYCODONE HYDROCHLORIDE 10 MILLIGRAM(S): 5 TABLET ORAL at 06:00

## 2019-07-26 RX ADMIN — Medication 10 MILLIGRAM(S): at 06:01

## 2019-07-26 RX ADMIN — PANTOPRAZOLE SODIUM 40 MILLIGRAM(S): 20 TABLET, DELAYED RELEASE ORAL at 06:00

## 2019-07-26 NOTE — PROGRESS NOTE ADULT - SUBJECTIVE AND OBJECTIVE BOX
Pt S/E at bedside, no acute events overnight, pain controlled    Vital Signs Last 24 Hrs  T(C): 36.5 (25 Jul 2019 21:51), Max: 37.2 (25 Jul 2019 05:11)  T(F): 97.7 (25 Jul 2019 21:51), Max: 98.9 (25 Jul 2019 05:11)  HR: 75 (25 Jul 2019 21:51) (66 - 83)  BP: 125/82 (25 Jul 2019 21:51) (107/75 - 129/81)  BP(mean): --  RR: 18 (25 Jul 2019 21:51) (16 - 20)  SpO2: 98% (25 Jul 2019 21:51) (97% - 100%)    Gen: NAD    Spine:  Dressing clean dry intact, +JPdrain  +EHL/FHL/TA/GS  +AIN/PIN/M/R/U/Msc/Ax  SILT L3-S1  SILT C5-T1  +DP/PT Pulses  +Radial Pulse  Compartments soft  No calf TTP B/L

## 2019-07-26 NOTE — DISCHARGE NOTE NURSING/CASE MANAGEMENT/SOCIAL WORK - NSDCDPATPORTLINK_GEN_ALL_CORE
You can access the [facility] Patient Portal, offered by [facility], by registering with the following website: [email or web address]

## 2019-08-07 PROBLEM — I10 ESSENTIAL (PRIMARY) HYPERTENSION: Chronic | Status: ACTIVE | Noted: 2019-07-12

## 2019-08-07 PROBLEM — R56.9 UNSPECIFIED CONVULSIONS: Chronic | Status: ACTIVE | Noted: 2017-04-25

## 2019-08-09 DIAGNOSIS — T84.296A OTHER MECHANICAL COMPLICATION OF INTERNAL FIXATION DEVICE OF VERTEBRAE, INITIAL ENCOUNTER: ICD-10-CM

## 2019-08-09 DIAGNOSIS — M96.0 PSEUDARTHROSIS AFTER FUSION OR ARTHRODESIS: ICD-10-CM
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
## 2019-08-09 DIAGNOSIS — G40.909 EPILEPSY, UNSPECIFIED, NOT INTRACTABLE, WITHOUT STATUS EPILEPTICUS: ICD-10-CM

## 2019-08-09 DIAGNOSIS — I10 ESSENTIAL (PRIMARY) HYPERTENSION: ICD-10-CM

## 2019-08-09 DIAGNOSIS — E78.5 HYPERLIPIDEMIA, UNSPECIFIED: ICD-10-CM

## 2019-08-09 DIAGNOSIS — M51.16 INTERVERTEBRAL DISC DISORDERS WITH RADICULOPATHY, LUMBAR REGION: ICD-10-CM

## 2019-08-09 DIAGNOSIS — E66.09 OTHER OBESITY DUE TO EXCESS CALORIES: ICD-10-CM

## 2019-08-09 DIAGNOSIS — E87.6 HYPOKALEMIA: ICD-10-CM

## 2019-08-24 NOTE — ED ADULT TRIAGE NOTE - SPO2 (%)
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      Abe has persistent loose stools and is TPN dependent. Flex sig biopsies have been normal. C diff positive in 6/19, on po vancomycin. She has a history of multiple viral illnesses including influenza, norovirus in 3/2019 and coronovirus this admission. Most likely cause of loose stools is villous atrophy due to these prior infections. NG feeds have been held due to vomiting and loose stools. She is on TPN to meet fluid maintenance and nutritional need. She has had some skin breakdown around anus and on buttocks and perineum. Will continue to monitor and use supportive care and pain medications as needed.     Patient is s/p U-CART therapy as a bridge to bone marrow transplant. BMT is today 8/22 with matched-sibling BMT (brother was harvested 8/21). Last BM aspirate performed on 8/13 with no myeloblasts, lymphoblasts, or hematogones. Conditioning chemotherapy with busulfan day-7 to day -5, melphalan day-3, day -2. VOD prophylaxis started on day -7. GVHD prophylaxis: Tacrolimus to start Day -3 and methotrexate for days +1, +3, +6, +11. GCSF to start Day +5.     Abe has a previous history of thrombi and has received lovenox in the past. Patient received U/S of abdomen and previous portal vein thrombus was no longer identified. She had upper extremity doppler as well as ultrasound of her iliacs/IVC/aorta by vascular which doesn't show any evidence of deep venous thrombosis in the right and left internal jugular, innominate, and subclavian veins. Due to concern for atretic central vasculature, CT chest and neck performed on 8/15/2019 with occlusion of major arteries seen and many collaterals formed with edema of the mediastinum and lower neck and axillary tissues. Likely due to chronic thrombi. She is s/p tPA prior to BMT, also s/p heparin and now on lovenox. 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 Thus, she will be treated with prophylactic dose of lovenox instead of treatment dose of lovenox.     Abe has had repeated elevated BP above her 95th percentile (100/55). BP are being managed with hydralazine 0.2mg prn and amlodipine was increased to 2mg daily for management of BP higher than 90-95%ile (100/55). Lasix was added 11mg BID for net positive fluid balance and BP control.     Heme/Onc  - parameters 8/30  - s/p UCART 7/2, MRD on day +27 showed 86% engraftment and negative for disease  - s/p IVIG 8/6, IgG level on 8/15 652, 8/19 786 - will recheck Qweek  - will receive IVIG 8/23  - s/p tPA (8/16-8/21)  - s/p Heparin 20U/kg (24hr) following tPA  - c/w Lovenox subQ 1mg/kg QD for prophylactic dosing  - s/p conditioning with Busulfan 12mg Q6 [id number] (day-7 to day-4), melphalon 34mg on day-3 and day -2  - VOD prophylaxis: Glutamine 1g BID, ursodiol 55mg BID; HOLD heparin 4U/kg/hr due to lovenox ppx  - GVHD Prophylaxis to start Day -3: Tacrolimus .03mg/kg/day, MTX 15mg/m2 on Day +1 +3 +6 +11   - CT venogram head/neck on 8/15 chronic thrombi, repeat CT venogram 8/21 unchanged  - Neupogen to start day +5    ID:  - acyclovir oral liquid 165mg Q6hr (15mg/kg)  - chlorhexidine 15mL swish and spit TID  - s/p bactrim oral liquid 28mg Monday and Tuesday BID (9am, 9pm), given until day -2, due day +28  - levofloxacin IV 110mg BID (10mg/kg) q12  - vancomycin oral liquid 110mg Q12hr (10mg/kg)  - micafungin IV 22 mg daily (2mg/kg)    Neuro:  - history of methotrexate leukoencephalopathy s/p Keppra  - oxycodone .55mg Q6prn pain  - s/p keppra 110mg IV BID until day -3 (with busulfan)    Cardio:  - hemodynamically stable  - hydralazine 2.2mg q4 (0.2mg/kg)  - amlodipine 2mg daily  - lasix 11mg BID     FENGI  - NPO - discontinue NG feeds due to vomiting  - TPN - 40mL/hr to account for decrease in NG feeds and daily fluid intake (per nutrition)  - Cleared for PO feeds, speech and swallow following for therapy  - ondansetron IV 1.7mg Q8hr (0.15mg/kg/dose)  - pantopazole IV 11mg (1mg/kg/dose)  - lorazepam IV 0.22mg Q6hr PRN for nausea (0.02mg/kg/dose)  - vitamin K 5mg PO weekly  - monitor I/Os as she is receiving 1.5 maintenance with TPN and KVO    Skin  - monitor skin breakdown    Pain  - oxycodone 0.05 mg/kg q4h prn    Access: ISABELLA, MARIEL Broviac in L femoral vein 98

## 2019-08-28 ENCOUNTER — APPOINTMENT (OUTPATIENT)
Dept: NEUROLOGY | Facility: CLINIC | Age: 49
End: 2019-08-28
Payer: MEDICAID

## 2019-08-28 VITALS
BODY MASS INDEX: 33.32 KG/M2 | WEIGHT: 200 LBS | HEIGHT: 65 IN | DIASTOLIC BLOOD PRESSURE: 80 MMHG | SYSTOLIC BLOOD PRESSURE: 160 MMHG

## 2019-08-28 DIAGNOSIS — Z86.79 PERSONAL HISTORY OF OTHER DISEASES OF THE CIRCULATORY SYSTEM: ICD-10-CM

## 2019-08-28 PROCEDURE — 99214 OFFICE O/P EST MOD 30 MIN: CPT

## 2019-08-28 RX ORDER — ENALAPRIL MALEATE 5 MG/1
TABLET ORAL
Refills: 0 | Status: ACTIVE | COMMUNITY

## 2019-08-28 RX ORDER — AMLODIPINE BESYLATE 5 MG/1
TABLET ORAL
Refills: 0 | Status: ACTIVE | COMMUNITY

## 2019-08-28 NOTE — DATA REVIEWED
[de-identified] : Head CT from 6/1/15 demonstrated a stable left middle cranial fossa arachnoid cyst.

## 2019-08-28 NOTE — PHYSICAL EXAM
[General Appearance - Alert] : alert [General Appearance - In No Acute Distress] : in no acute distress [Oriented To Time, Place, And Person] : oriented to person, place, and time [Affect] : the affect was normal [Memory Recent] : recent memory was not impaired [Memory Remote] : remote memory was not impaired [Cranial Nerves Optic (II)] : visual acuity intact bilaterally,  visual fields full to confrontation, pupils equal round and reactive to light [Cranial Nerves Oculomotor (III)] : extraocular motion intact [Cranial Nerves Facial (VII)] : face symmetrical [Cranial Nerves Trigeminal (V)] : facial sensation intact symmetrically [Cranial Nerves Vestibulocochlear (VIII)] : hearing was intact bilaterally [Cranial Nerves Glossopharyngeal (IX)] : tongue and palate midline [Cranial Nerves Accessory (XI - Cranial And Spinal)] : head turning and shoulder shrug symmetric [Cranial Nerves Hypoglossal (XII)] : there was no tongue deviation with protrusion [Motor Tone] : muscle tone was normal in all four extremities [Motor Strength] : muscle strength was normal in all four extremities [Sensation Tactile Decrease] : light touch was intact [Sensation Pain / Temperature Decrease] : pain and temperature was intact [Sensation Vibration Decrease] : vibration was intact [1+] : Patella left 1+ [Optic Disc Abnormality] : the optic disc were normal in size and color [Arterial Pulses Carotid] : carotid pulses were normal with no bruits [Edema] : there was no peripheral edema [Involuntary Movements] : no involuntary movements were seen [Dysarthria] : no dysarthria [Aphasia] : no dysphasia/aphasia [Romberg's Sign] : Romberg's sign was negtive [Coordination - Dysmetria Impaired Finger-to-Nose Bilateral] : not present [Plantar Reflex Right Only] : normal on the right [Plantar Reflex Left Only] : normal on the left [FreeTextEntry8] : Ambulation required a cane.

## 2019-08-28 NOTE — ASSESSMENT
[FreeTextEntry1] : This is a 48 year-old man with epilepsy.\par I will continue his current dosages of Keppra and Depakote.\par \par Headaches are not bothering him significantly at present.\par \par He also has lumbar spine disease and is status post surgery. \par He will continue to follow with his orthopedist.\par \par I will see him back in 6 months.
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
## 2019-11-02 NOTE — ASU PREOP CHECKLIST - HAIR REMOVAL
Post -operative # 3    Patient reports: No complaints.     Visit Vitals  /83 (BP Location: LUE - Left upper extremity, Patient Position: Semi-Braxton's)   Pulse 81   Temp 98.9 °F (37.2 °C) (Oral)   Resp 16   Ht 5' 3\" (1.6 m)   Wt 74.8 kg   LMP 01/30/2019 (Exact Date)   SpO2 98%   Breastfeeding? Unknown   BMI 29.21 kg/m²       Incision: clean and intact    Uterine fundus firm, mildly tender.    Extremities: Non-tender, no edema    HCT (%)   Date Value   10/31/2019 27.5 (L)       Impression: Doing well.    Plan: Routine postpartum care. Home today.  Instructions given     hair removal not indicated

## 2020-01-01 NOTE — ED ADULT NURSE NOTE - CAS DISCH CONDITION
[facility]    Congratulations on your new baby!    Feeding  Feed only breast milk or iron fortified formula, no water or juice until your baby is at least 6 months old.  It's ok to feed your baby whenever they seem hungry - they may put their hands near their mouths, fuss, cry, or root.  You don't have to stick to a strict schedule, but don't go longer than 4 hours without a feeding.  Spit-ups are common in babies, but call the office for green or projectile vomit.    Breastfeeding:   · Breastfeed about 8-12 times per day  · Give Vitamin D drops daily, 400IU  · [facility] ([phone]) offers breastfeeding counseling, breastfeeding supplies, pump rentals, and more    Formula feeding:  · Offer your baby 2 ounces every 2-3 hours, more if still hungry  · Hold your baby so you can see each other when feeding  · Don't prop the bottle    Sleep  Most newborns will sleep about 16-18 hours each day.  It can take a few weeks for them to get their days and nights straight as they mature and grow.     · Make sure to put your baby to sleep on their back, not on their stomach or side  · Cribs and bassinets should have a firm, flat mattress  · Avoid any stuffed animals, loose bedding, or any other items in the crib/bassinet aside from your baby and a swaddled blanket    Infant Care  · Make sure anyone who holds your baby (including you) has washed their hands first.  · Infants are very susceptible to infections in th first months of life so avoids crowds.  · For checking a temperature, use a rectal thermometer - if your baby has a rectal temperature higher than 100.4 F, call the office right away.  · The umbilical cord should fall off within 1-2 weeks.  Give sponge baths until the umbilical cord has fallen off and healed - after that, you can do submersion baths  · If your baby was circumcised, apply A&D ointment to the circumcision site until the area has healed, usually about 7-10 days  · Keep your baby out of  the sun as much as possible  · Keep your infants fingernails short by gently using a nail file  · Monitor siblings around your new baby.  Pre-school age children can accidentally hurt the baby by being too rough    Peeing and Pooping  · Most infants will have about 6-8 wet diapers per day after they're a week old  · Poops can occur with every feed, or be several days apart  · Constipation is a question of quality, not quantity - it's when the poop is hard and dry, like pellets - call the office if this occurs  · For gas, make sure you baby is not eating too fast.  Burp your infant in the middle of a feed and at the end of a feed.  Try bicycling your baby's legs or rubbing their belly to help pass the gas    Skin  Babies often develop rashes, and most are normal.  Triple paste, Pradeep's Butt Paste, and Desitin Maximum Strength are good choices for diaper rashes.    · Jaundice is a yellow coloration of the skin that is common in babies.  You can place your infant near a window (indirect sunlight) for a few minutes at a time to help make the jaundice go away  · Call the office if you feel like the jaundice is new, worsening, or if your baby isn't feeding, pooping, or urinating well  · Use gentle products to bathe your baby.  Also use gentle products to clean you baby's clothes and linens    Colic  · In an otherwise healthy baby, colic is frequent screaming or crying for extended periods without any apparent reason  · Crying usually occurs at the same time each day, most likely in the evenings  · Colic is usually gone by 3 1/2 months of age  · Try swaddling, swinging, patting, shhh sounds, white noise, calming music, or a car ride  · If all else fails lie your baby down in the crib and minimize stimulation  · Crying will not hurt your baby.    · It is important for the primary caregiver to get a break away from the infant each day  · NEVER SHAKE YOUR CHILD!    Home and Car Safety  · Make sure your home has working  smoke and carbon monoxide detectors  · Please keep your home and car smoke-free  · Never leave your baby unattended on a high surface (changing table, couch, your bed, etc).  Even though your baby can not roll yet he or she can move around enough to fall from the high surface  · Set the water heater to less than 120 degrees  · Infant car seats should be rear facing, in the middle of the back seat    Normal Baby Stuff  · Sneezing and hiccupping - this happens a lot in the  period and doesn't mean your baby has allergies or something wrong with its stomach  · Eyes crossing - it can take a few months for the eyes to start moving together  · Breast bud development (in boys and girls) and vaginal discharge - this is a result of mom's hormones that can pass through the placenta to the baby - it will go away over time    Post-Partum Depression  · It's common to feel sad, overwhelmed, or depressed after giving birth.  If the feelings last for more than a few days, please call our office or your obstetrician.      Call the office right away for:  · Fever > 100.4 rectally, difficulty breathing, no wet diapers in > 12 hours, more than 8 hours between feeds, white stools, or projectile vomiting, worsening jaundice or other concerns    Important Phone Numbers  Emergency: 911  [place] [facility]: [phone]  [facility]: [phone]  [facility]: [phone]  [facility]: [phone]    Check Up and Immunization Schedule  Check ups:  1 month, 2 months, 4 months, 6 months, 9 months, 12 months, 15 months, 18 months, 2 years and yearly thereafter  Immunizations:  2 months, 4 months, 6 months, 12 months, 15 months, 2 years, 4 years, 11 years and 16 years    Websites  Trusted information from the AAP: http://www.healthychildren.org  Vaccine information:  http://www.cdc.gov/vaccines/parents/index.html         Stable

## 2020-02-26 ENCOUNTER — APPOINTMENT (OUTPATIENT)
Dept: NEUROLOGY | Facility: CLINIC | Age: 50
End: 2020-02-26
Payer: MEDICARE

## 2020-02-26 VITALS
BODY MASS INDEX: 33.32 KG/M2 | WEIGHT: 200 LBS | DIASTOLIC BLOOD PRESSURE: 70 MMHG | SYSTOLIC BLOOD PRESSURE: 120 MMHG | HEIGHT: 65 IN

## 2020-02-26 DIAGNOSIS — G44.89 OTHER HEADACHE SYNDROME: ICD-10-CM

## 2020-02-26 DIAGNOSIS — G40.909 EPILEPSY, UNSPECIFIED, NOT INTRACTABLE, W/OUT STATUS EPILEPTICUS: ICD-10-CM

## 2020-02-26 DIAGNOSIS — M54.16 RADICULOPATHY, LUMBAR REGION: ICD-10-CM

## 2020-02-26 PROCEDURE — 99214 OFFICE O/P EST MOD 30 MIN: CPT

## 2020-02-26 NOTE — CONSULT LETTER
[Dear  ___] : Dear  [unfilled], [Consult Closing:] : Thank you very much for allowing me to participate in the care of this patient.  If you have any questions, please do not hesitate to contact me. [Courtesy Letter:] : I had the pleasure of seeing your patient, [unfilled], in my office today. [Please see my note below.] : Please see my note below. [Sincerely,] : Sincerely, [FreeTextEntry3] : Jeet Howard MD.

## 2020-02-26 NOTE — PHYSICAL EXAM
[General Appearance - In No Acute Distress] : in no acute distress [General Appearance - Alert] : alert [Oriented To Time, Place, And Person] : oriented to person, place, and time [Affect] : the affect was normal [Memory Remote] : remote memory was not impaired [Memory Recent] : recent memory was not impaired [Cranial Nerves Optic (II)] : visual acuity intact bilaterally,  visual fields full to confrontation, pupils equal round and reactive to light [Cranial Nerves Oculomotor (III)] : extraocular motion intact [Cranial Nerves Trigeminal (V)] : facial sensation intact symmetrically [Cranial Nerves Facial (VII)] : face symmetrical [Cranial Nerves Vestibulocochlear (VIII)] : hearing was intact bilaterally [Cranial Nerves Glossopharyngeal (IX)] : tongue and palate midline [Cranial Nerves Hypoglossal (XII)] : there was no tongue deviation with protrusion [Cranial Nerves Accessory (XI - Cranial And Spinal)] : head turning and shoulder shrug symmetric [Motor Tone] : muscle tone was normal in all four extremities [Motor Strength] : muscle strength was normal in all four extremities [Sensation Tactile Decrease] : light touch was intact [Sensation Pain / Temperature Decrease] : pain and temperature was intact [Sensation Vibration Decrease] : vibration was intact [1+] : Patella left 1+ [Optic Disc Abnormality] : the optic disc were normal in size and color [Arterial Pulses Carotid] : carotid pulses were normal with no bruits [Edema] : there was no peripheral edema [Involuntary Movements] : no involuntary movements were seen [Dysarthria] : no dysarthria [Aphasia] : no dysphasia/aphasia [Romberg's Sign] : Romberg's sign was negtive [Coordination - Dysmetria Impaired Finger-to-Nose Bilateral] : not present [Plantar Reflex Left Only] : normal on the left [Plantar Reflex Right Only] : normal on the right [FreeTextEntry8] : Ambulation required a cane.

## 2020-02-26 NOTE — HISTORY OF PRESENT ILLNESS
[FreeTextEntry1] : I saw this patient in the office today.\par \par As you recall he has a history of epilepsy. \par He had been on Keppra 1000 mg twice per day. He did mention some headaches as well and he has been on Depakote in addition to the Keppra. The headaches do not bother him much since the Depakote was increased to the current dosage.\par \par He is currently on Keppra 1000 mg twice per day and Depakote 1000 mg twice per day.\par \par He is now status post lower spine surgery.\par This was in March of 2018.\par \par He has had no seizures since his last visit.\par \par His last seizure was about 3 years ago.\par

## 2020-02-26 NOTE — DATA REVIEWED
[de-identified] : Head CT from 6/1/15 demonstrated a stable left middle cranial fossa arachnoid cyst.

## 2020-03-09 NOTE — H&P ADULT - PMH
Chronic back pain    Chronic neck pain    Seizure    Seizure disorder HDS, NAD, MMM, eyes clear, lungs CTAB, heart sounds normal, abd soft, NT, ND, no CVAT, LEs without edema, wwp, skin normal temperature and color, neuro: alert and oriented, no focal deficits

## 2020-08-25 RX ORDER — DIVALPROEX SODIUM 500 MG/1
500 TABLET, DELAYED RELEASE ORAL TWICE DAILY
Qty: 120 | Refills: 5 | Status: ACTIVE | COMMUNITY
Start: 1900-01-01 | End: 1900-01-01

## 2020-08-25 RX ORDER — LEVETIRACETAM 500 MG/1
500 TABLET, FILM COATED ORAL TWICE DAILY
Qty: 120 | Refills: 5 | Status: ACTIVE | COMMUNITY
Start: 1900-01-01 | End: 1900-01-01

## 2020-10-15 ENCOUNTER — APPOINTMENT (OUTPATIENT)
Dept: NEUROLOGY | Facility: CLINIC | Age: 50
End: 2020-10-15

## 2020-11-05 ENCOUNTER — APPOINTMENT (OUTPATIENT)
Dept: NEUROLOGY | Facility: CLINIC | Age: 50
End: 2020-11-05

## 2020-11-16 NOTE — ED STATDOCS - ATTENDING CONTRIBUTION TO CARE
Billing Type: Third-Party Bill
Bill For Surgical Tray: no
Expected Date Of Service: 11/16/2020
I, Dr. Daily, performed the initial face to face bedside interview with this patient regarding history of present illness, review of symptoms and relevant past medical, social and family history.  I completed an independent physical examination.  I was the initial provider who evaluated this patient. I have signed out the follow up of any pending tests (i.e. labs, radiological studies) to the ACP.  I have communicated the patient’s plan of care and disposition with the ACP.
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
## 2022-02-28 NOTE — ED ADULT TRIAGE NOTE - WEIGHT METHOD
stated Continue Regimen: SPF 30+ broad spectrum every 3-4 hours when outside. Detail Level: Generalized

## 2022-05-19 NOTE — ED ADULT NURSE NOTE - AREA
[facility])    [address] [facility], DO    Office Visit    [facility]    [place] [address], [address] [place] [address], DO    Office Visit    1 year ago Diabetes mellitus without complication [facility])    [place] [address], [address] [facility], DO    Office Visit    1 year ago Diabetes mellitus without complication [facility])    [place] [address], [address] [place] [address], DO    Office Visit    2 years ago Routine general medical examination at a health care facility    [address] [facility]    [facility] Visit
lower

## 2022-07-21 NOTE — ED ADULT NURSE NOTE - SEVERITY
PAIN SCALE 6 OF 10. Minoxidil Counseling: Minoxidil is a topical medication which can increase blood flow where it is applied. It is uncertain how this medication increases hair growth. Side effects are uncommon and include stinging and allergic reactions.

## 2022-10-27 NOTE — ED ADULT NURSE NOTE - PYSCHOSOCIAL ASSESSMENT
Reviewed Dr Zheng'WDL

## 2022-11-08 NOTE — ED PROVIDER NOTE - CROS ED SKIN ALL NEG
Rhomboid Transposition Flap Text: The defect edges were debeveled with a #15 scalpel blade.  Given the location of the defect and the proximity to free margins a rhomboid transposition flap was deemed most appropriate.  Using a sterile surgical marker, an appropriate rhomboid flap was drawn incorporating the defect.    The area thus outlined was incised deep to adipose tissue with a #15 scalpel blade.  The skin margins were undermined to an appropriate distance in all directions utilizing iris scissors. - - -

## 2022-12-15 NOTE — ED STATDOCS - CARE PLAN
Principal Discharge DX:	Back pain  Secondary Diagnosis:	Neck pain [employer] Counseling:  I discussed with the patient the risks of mycophenolate mofetil including but not limited to infection/immunosuppression, GI upset, hypokalemia, hypercholesterolemia, bone marrow suppression, lymphoproliferative disorders, malignancy, GI ulceration/bleed/perforation, colitis, interstitial lung disease, kidney failure, progressive multifocal leukoencephalopathy, and birth defects.  The patient understands that monitoring is required including a baseline creatinine and regular CBC testing. In addition, patient must alert us immediately if symptoms of infection or other concerning signs are noted.

## 2023-02-17 NOTE — PATIENT PROFILE ADULT - ABILITY TO HEAR (WITH HEARING AID OR HEARING APPLIANCE IF NORMALLY USED):
0700: Assumed care  pt from [facility]. Adequate: hears normal conversation without difficulty

## 2023-07-17 NOTE — ED STATDOCS - RESPIRATORY, MLM
breath sounds clear and equal bilaterally. Complex Repair And O-L Flap Text: The defect edges were debeveled with a #15 scalpel blade.  The primary defect was closed partially with a complex linear closure.  Given the location of the remaining defect, shape of the defect and the proximity to free margins an O-L flap was deemed most appropriate for complete closure of the defect.  Using a sterile surgical marker, an appropriate flap was drawn incorporating the defect and placing the expected incisions within the relaxed skin tension lines where possible.    The area thus outlined was incised deep to adipose tissue with a #15 scalpel blade.  The skin margins were undermined to an appropriate distance in all directions utilizing iris scissors.

## 2023-07-30 NOTE — ED ADULT NURSE NOTE - AS SC BRADEN MOISTURE
Thank you for allowing us to care for you at [facility] today. Thank you for your patience.     You have been seen and evaluated. We reviewed the results from your visit in the emergency department. Please read the instructions provided, and if given prescriptions, take as instructed.     Remember, you care process does not end after your visit today. Please follow-up with your doctor within 1-2 days for a follow-up check to ensure you are  improving, to see if you need any further evaluation/testing, or to evaluate for any alternate diagnoses. If you do not have a primary care provider, call the Internal Medicine Clinic at [phone] or the [facility] at [phone] to establish care. Call [phone] if you are having difficult scheduling an appointment and they will be able to assist you further.     Please return to the emergency department if you develop new or worsening symptoms such as fever, chills, worsening pain, numbness of weakness, difficulty walking, inability to hold in your urine or stool, numbness to your groin or anus, or if you develop any other new or concerning symptoms as these could be signs of more serious medical illness.    We hope you feel better.       (4) rarely moist

## 2023-10-24 NOTE — ED STATDOCS - [facility] ED [facility] ALL NEG
Physical Therapy    Physical Therapy Treatment    Patient Name: Imtiaz Butler  MRN: [id number]  Today's Date: 10/24/2023  Time Calculation  Start Time: 1400  Stop Time: 1445  Time Calculation (min): 45 min  Visit # 12      Assessment:  Did well with machines w/o hip pain. Encouraged her to call her MD for a referral to [facility].       Plan:  Await transition to pool .    Current Problem  1. Right knee pain, unspecified chronicity        2. Right leg swelling              Subjective   Still has not heard from her Ortho for pool PT. Got a shot in her hip but has not taken effect yet.        No knee pain, hip 6/10 VAS     Objective   Flexed posture d/t hip pain     Treatments:  Therapeutic Exercise  Therapeutic Exercise Activity 2: Hip adductor squeeze x 40 reps  Therapeutic Exercise Activity 3: Hip abduction BTB x 40 reps  Therapeutic Exercise Activity 4: Heel raises #8 x 20 R/L  Therapeutic Exercise Activity 5: Toe raises #5 x 20  Therapeutic Exercise Activity 6: Glute squeezes x 40 reps  Therapeutic Exercise Activity 7: Ab brace x 40 reps              Goals:  Active       PT Problem       PT Goal 1       Start:  10/10/23    Expected End:  01/11/24       We are continuing the therapy plan of care and [facility]  Please refer to the PT (or OT) plan of care in the EMR for treatment plan and goals.               - - -

## 2023-11-02 NOTE — ED ADULT TRIAGE NOTE - NS ED NURSE BANDS TYPE
Reason for Admission: Status Epilepticus    Cognitive/Mentation: A/Ox ANN  Neuros/CMS: Mute, lethargic, does not follow commands, occasionally will open eyes, BLE spontaneous leg jerking, BUE, BLE contracted  VS: stable. .  GI:. InContinent.   external catheter  Pulmonary: LS coarse.  Pain: no signs non verbally.     Drains/Lines: PICC  Skin: pale,dry, blancheable coccyx wound  Activity: Assist x 2 with lift.  Diet: npo/ TF and pills crushed in via PEG.       Discharge: pending guardianship                          Name band;

## 2024-01-29 NOTE — PROGRESS NOTE ADULT - SUBJECTIVE AND OBJECTIVE BOX
ACUTE OCCUPATIONAL THERAPY GOALS:   (Developed with and agreed upon by patient and/or caregiver.)    1. Patient will complete UPPER body bathing and dressing with SET UP and adaptive equipment as needed.     2. Patient will complete functional bed mobility for ADLs with MINIMAL ASSIST.   3. Patient will complete grooming ADL in unsupported sitting with SET UP.  4. Patient will tolerate 25 minutes of OT treatment with 1-2 rest breaks to increase activity tolerance for ADLs.   5. Patient will complete functional transfers with MODERATE ASSIST and adaptive equipment as needed.   6. Patient will tolerate 10 minutes BUE exercises to increase strength for safe, functional transfers.   OCCUPATIONAL THERAPY: Daily Note AM/PM  OT Visit Days: 4   Time In/Out  OT Charge Capture  Rehab Caseload Tracker  OT Orders    WBAT for transfers only    Ama Chu is a 72 y.o. female   PRIMARY DIAGNOSIS: Closed disp comminuted fracture of shaft of left femur with malunion  Closed disp comminuted fracture of shaft of left femur with malunion [S72.352P]  Procedure(s) (LRB):  EGD ESOPHAGOGASTRODUODENOSCOPY (N/A)  2 Days Post-Op  Inpatient: Payor: MEDICARE / Plan: MEDICARE PART A AND B / Product Type: *No Product type* /     ASSESSMENT:     REHAB RECOMMENDATIONS:   Recommendation to date pending progress:  Setting:  Short-term Rehab    Equipment:    To Be Determined     ASSESSMENT:  Ms. Chu is doing fair today. Pt c/o pain at level 7. Supine upon arrival. Pt continue to require max A x2-3 for all bed mobility and sit to stand attempts. Pt was not able to clear bottom of bed. Pt returned to supine for brief and linen change. Pt was transferred over to chair by sheet. Later patient assisted back to supine with same amount of assistance. Rolled for more linen and brief change. Minimal progress made today. Will continue to benefit from skilled OT during stay.       SUBJECTIVE:     Ms. Chu states, \"Just my leg hurts\"    Social/Functional  Patient is a 47y old  Male who presents with a chief complaint of Surgery: ACDF C4-6 (10 Aug 2018 05:34)    Date of service: 08-11-18 @ 11:56      Patient comfortable, hungry      ROS: no fever or chills; denies dizziness, no HA,  no abdominal pain, no diarrhea or constipation; no dysuria, no urinary frequency, no legs pain, no rashes    MEDICATIONS  (STANDING):  ALBUTerol    0.083% 2.5 milliGRAM(s) Nebulizer every 6 hours  ascorbic acid 500 milliGRAM(s) Oral two times a day  cyclobenzaprine 10 milliGRAM(s) Oral every 8 hours  dexamethasone  Injectable 4 milliGRAM(s) IV Push every 8 hours  diVALproex Sprinkle 1000 milliGRAM(s) Oral two times a day  docusate sodium 100 milliGRAM(s) Oral three times a day  folic acid 1 milliGRAM(s) Oral daily  lactated ringers. 1000 milliLiter(s) (75 mL/Hr) IV Continuous <Continuous>  levETIRAcetam 1000 milliGRAM(s) Oral two times a day  multivitamin 1 Tablet(s) Oral daily  pantoprazole    Tablet 40 milliGRAM(s) Oral before breakfast  pantoprazole  Injectable 40 milliGRAM(s) IV Push daily  piperacillin/tazobactam IVPB. 3.375 Gram(s) IV Intermittent every 8 hours  senna 2 Tablet(s) Oral at bedtime    MEDICATIONS  (PRN):  acetaminophen   Tablet 650 milliGRAM(s) Oral every 6 hours PRN For Temp greater than 38 C (100.4 F)  acetaminophen   Tablet. 650 milliGRAM(s) Oral every 6 hours PRN headache  ALBUTerol    0.083% 2.5 milliGRAM(s) Nebulizer every 3 hours PRN Shortness of Breath and/or Wheezing  benzocaine 15 mG/menthol 3.6 mG Lozenge 1 Lozenge Oral every 3 hours PRN Sore Throat  diphenhydrAMINE   Capsule 25 milliGRAM(s) Oral at bedtime PRN Insomnia  diphenhydrAMINE   Injectable 12.5 milliGRAM(s) IV Push every 4 hours PRN Itching  guaiFENesin    Syrup 200 milliGRAM(s) Oral every 6 hours PRN Cough  HYDROmorphone  Injectable 1 milliGRAM(s) SubCutaneous every 6 hours PRN Severe Pain (7 - 10)  ondansetron   Disintegrating Tablet 4 milliGRAM(s) Oral every 4 hours PRN Nausea  oxyCODONE    IR 5 milliGRAM(s) Oral every 4 hours PRN Mild Pain (1 - 3)  oxyCODONE    IR 10 milliGRAM(s) Oral every 4 hours PRN Moderate Pain (4 - 6)      Vital Signs Last 24 Hrs  T(C): 36.7 (11 Aug 2018 11:11), Max: 36.7 (11 Aug 2018 11:11)  T(F): 98.1 (11 Aug 2018 11:11), Max: 98.1 (11 Aug 2018 11:11)  HR: 85 (11 Aug 2018 11:11) (74 - 87)  BP: 128/84 (11 Aug 2018 11:11) (105/73 - 128/84)  BP(mean): 92 (11 Aug 2018 11:11) (92 - 92)  RR: 16 (11 Aug 2018 11:11) (16 - 18)  SpO2: 96% (11 Aug 2018 11:11) (96% - 100%)    Physical Exam:        Constitutional: frail looking  HEENT: NC/AT, EOMI, PERRLA, conjunctivae clear  Neck: supple; drain in anterior neck  Back: no tenderness  Respiratory: respiratory effort normal; scattered coarse breath sounds  Cardiovascular: S1S2 regular, no murmurs  Abdomen: soft, not tender, not distended, positive BS; no liver or spleen organomegaly  Genitourinary: no suprapubic tenderness  Musculoskeletal: no muscle tenderness, no joint swelling or tenderness  Neurological/ Psychiatric: AxOx3, judgement and insight normal;  moving all extremities  Skin: no rashes; no palpable lesions    Labs: all available labs reviewed                        Labs:                        13.7   12.50 )-----------( 235      ( 11 Aug 2018 05:40 )             41.1     08-11    140  |  102  |  15  ----------------------------<  133<H>  4.5   |  29  |  0.86    Ca    9.0      11 Aug 2018 05:40             Cultures:            < from: CT Chest No Cont (08.10.18 @ 09:29) >    EXAM:  CT CHEST                            PROCEDURE DATE:  08/10/2018          INTERPRETATION:  Chest CT without contrast dated 8/10/2018.    COMPARISON: None available.    CLINICAL INFORMATION: Hypoxia.    TECHNIQUE: Contiguous axial 2.5 mm slicethickness images of the chest   were obtained without intravenous contrast administration.    FINDINGS:  The thyroid lobes are unremarkable.    The airway shows normal caliber and contour with patent lumen.    The lower end of a percutaneous drainagecatheter is visualized in the   left neck, with some soft tissue emphysema in the anterior left neck.     There is some infiltrates in the posterior medial right upper lobe. There   is consolidation of the superior segment of the right lower lobe. Some   atelectatic changes in the lingula. Consolidation of the posterior medial   segment of the left lower lobe and atelectatic changes in bilateral lower   lobes.    There is no pleural effusion or pneumothorax.    There are no mediastinal lymphadenopathy or masses.    The mediastinum great vessels are normal.     The heart is normal. There is no pericardial effusion.    A limited evaluation of the upper abdomen, fatty infiltration of the   liver.    The bones, the patient is status post surgicalfusion of the cervical   spine findings. Some multilevel degenerative disc disease in the mid   thoracic spine.    IMPRESSION: Bilateral pulmonary infiltrates and atelectasis as described,   likely postop in nature. Superimposed multifocal pneumonia is the   consideration and clinical correlation is recommended. Consider   aspiration as a possible cause.  Other findings as above.    < end of copied text >          Radiology: all available radiological tests reviewed    Advanced directives addressed: full resuscitation

## 2024-02-29 NOTE — ED ADULT NURSE NOTE - INTEGUMENTARY WDL
ANTICOAGULATION MANAGEMENT     Jerald Menon 68 Renny Connelly MD Referring Internal Medicine [phone]               Color consistent with ethnicity/race, warm, dry intact, resilient.

## 2024-06-05 NOTE — H&P PST ADULT - NSANTHBPHIGHRD_ENT_A_CORE
The patient's goals for the shift include      The clinical goals for the shift include safety      Problem: Fall/Injury  Goal: Not fall by end of shift  Outcome: Progressing  Goal: Be free from injury by end of the shift  Outcome: Progressing  Goal: Verbalize understanding of personal risk factors for fall in the hospital  Outcome: Progressing  Goal: Verbalize understanding of risk factor reduction measures to prevent injury from fall in the home  Outcome: Progressing  Goal: Use assistive devices by end of the shift  Outcome: Progressing  Goal: Pace activities to prevent fatigue by end of the shift  Outcome: Progressing     Problem: Skin  Goal: Decreased wound size/increased tissue granulation at next dressing change  6/5/2024 1700 by Angelique Vasquez RN  Outcome: Progressing  6/5/2024 1700 by Angelique Vasquez RN  Flowsheets (Taken 6/5/2024 1700)  Decreased wound size/increased tissue granulation at next dressing change: Promote sleep for wound healing  Goal: Participates in plan/prevention/treatment measures  6/5/2024 1700 by Angelique Vasquze RN  Outcome: Progressing  6/5/2024 1700 by Angelique Vasquez RN  Flowsheets (Taken 6/5/2024 1700)  Participates in plan/prevention/treatment measures:   Discuss with provider PT/OT consult   Increase activity/out of bed for meals  Goal: Prevent/manage excess moisture  6/5/2024 1700 by Angelique Vasquez RN  Outcome: Progressing  6/5/2024 1700 by Angelique Vasquez RN  Flowsheets (Taken 6/5/2024 1700)  Prevent/manage excess moisture: Cleanse incontinence/protect with barrier cream  Goal: Prevent/minimize sheer/friction injuries  6/5/2024 1700 by Angelique Vasquez RN  Outcome: Progressing  6/5/2024 1700 by Angelique Vasquez RN  Flowsheets (Taken 6/5/2024 1700)  Prevent/minimize sheer/friction injuries:   Complete micro-shifts as needed if patient unable. Adjust patient position to relieve pressure points, not a full turn   HOB 30 degrees or less  Goal: Promote/optimize  nutrition  6/5/2024 1700 by Angelique Vasquez RN  Outcome: Progressing  6/5/2024 1700 by Angelique Vasquez RN  Flowsheets (Taken 6/5/2024 1700)  Promote/optimize nutrition: Monitor/record intake including meals  Goal: Promote skin healing  6/5/2024 1700 by Angelique aVsquez RN  Outcome: Progressing  6/5/2024 1700 by Angelique Vasquez RN  Flowsheets (Taken 6/5/2024 1700)  Promote skin healing: Assess skin/pad under line(s)/device(s)     Problem: Pain  Goal: My pain/discomfort is manageable  Outcome: Progressing     Problem: Safety  Goal: Patient will be injury free during hospitalization  Outcome: Progressing  Goal: I will remain free of falls  Outcome: Progressing     Problem: Safety  Goal: Patient will be injury free during hospitalization  Outcome: Progressing  Goal: I will remain free of falls  Outcome: Progressing     Problem: Daily Care  Goal: Daily care needs are met  Outcome: Progressing     Problem: Psychosocial Needs  Goal: Demonstrates ability to cope with hospitalization/illness  Outcome: Progressing  Goal: Collaborate with me, my family, and caregiver to identify my specific goals  Outcome: Progressing     Problem: Discharge Barriers  Goal: My discharge needs are met  Outcome: Progressing     Problem: Respiratory  Goal: No signs of respiratory distress (eg. Use of accessory muscles. Peds grunting)  Outcome: Progressing  Goal: Wean oxygen to maintain O2 saturation per order/standard this shift  Outcome: Progressing     Problem: Pain - Adult  Goal: Verbalizes/displays adequate comfort level or baseline comfort level  Outcome: Progressing     Problem: Safety - Adult  Goal: Free from fall injury  Outcome: Progressing     Problem: Discharge Planning  Goal: Discharge to home or other facility with appropriate resources  Outcome: Progressing     Problem: Chronic Conditions and Co-morbidities  Goal: Patient's chronic conditions and co-morbidity symptoms are monitored and maintained or improved  Outcome:  Progressing     Problem: Diabetes  Goal: Achieve decreasing blood glucose levels by end of shift  Outcome: Progressing  Goal: Increase stability of blood glucose readings by end of shift  Outcome: Progressing  Goal: Decrease in ketones present in urine by end of shift  Outcome: Progressing  Goal: Maintain electrolyte levels within acceptable range throughout shift  Outcome: Progressing  Goal: Maintain glucose levels >70mg/dl to <250mg/dl throughout shift  Outcome: Progressing  Goal: No changes in neurological exam by end of shift  Outcome: Progressing  Goal: Learn about and adhere to nutrition recommendations by end of shift  Outcome: Progressing  Goal: Vital signs within normal range for age by end of shift  Outcome: Progressing  Goal: Increase self care and/or family involovement by end of shift  Outcome: Progressing  Goal: Receive DSME education by end of shift  Outcome: Progressing        No

## 2024-10-23 NOTE — DISCHARGE NOTE PROVIDER - NSDCHC_MEDRECSTATUS_GEN_ALL_CORE
Head, normocephalic, atraumatic, Face, Face within normal limits, Ears, External ears within normal limits Admission Reconciliation is Completed  Discharge Reconciliation is Completed
